# Patient Record
Sex: FEMALE | Race: WHITE | NOT HISPANIC OR LATINO | Employment: OTHER | URBAN - METROPOLITAN AREA
[De-identification: names, ages, dates, MRNs, and addresses within clinical notes are randomized per-mention and may not be internally consistent; named-entity substitution may affect disease eponyms.]

---

## 2017-02-01 ENCOUNTER — GENERIC CONVERSION - ENCOUNTER (OUTPATIENT)
Dept: OTHER | Facility: OTHER | Age: 62
End: 2017-02-01

## 2017-02-02 ENCOUNTER — TRANSCRIBE ORDERS (OUTPATIENT)
Dept: ADMINISTRATIVE | Facility: HOSPITAL | Age: 62
End: 2017-02-02

## 2017-02-02 ENCOUNTER — ALLSCRIPTS OFFICE VISIT (OUTPATIENT)
Dept: OTHER | Facility: OTHER | Age: 62
End: 2017-02-02

## 2017-02-02 ENCOUNTER — HOSPITAL ENCOUNTER (OUTPATIENT)
Dept: RADIOLOGY | Facility: HOSPITAL | Age: 62
Discharge: HOME | End: 2017-02-02
Payer: COMMERCIAL

## 2017-02-02 DIAGNOSIS — M54.5 LOW BACK PAIN, UNSPECIFIED BACK PAIN LATERALITY, UNSPECIFIED CHRONICITY, WITH SCIATICA PRESENCE UNSPECIFIED: Primary | ICD-10-CM

## 2017-02-02 DIAGNOSIS — M54.50 LOW BACK PAIN: ICD-10-CM

## 2017-02-02 DIAGNOSIS — M54.5 LOW BACK PAIN, UNSPECIFIED BACK PAIN LATERALITY, UNSPECIFIED CHRONICITY, WITH SCIATICA PRESENCE UNSPECIFIED: ICD-10-CM

## 2017-02-02 PROCEDURE — 72114 X-RAY EXAM L-S SPINE BENDING: CPT

## 2017-02-12 ENCOUNTER — HOSPITAL ENCOUNTER (EMERGENCY)
Facility: HOSPITAL | Age: 62
Discharge: HOME | End: 2017-02-12
Attending: EMERGENCY MEDICINE | Admitting: EMERGENCY MEDICINE
Payer: COMMERCIAL

## 2017-02-12 VITALS
HEIGHT: 62 IN | TEMPERATURE: 98.5 F | OXYGEN SATURATION: 99 % | WEIGHT: 140 LBS | RESPIRATION RATE: 18 BRPM | HEART RATE: 77 BPM | SYSTOLIC BLOOD PRESSURE: 160 MMHG | BODY MASS INDEX: 25.76 KG/M2 | DIASTOLIC BLOOD PRESSURE: 82 MMHG

## 2017-02-12 DIAGNOSIS — R04.0 NOSEBLEED: Primary | ICD-10-CM

## 2017-02-12 DIAGNOSIS — R09.81 SINUS CONGESTION: ICD-10-CM

## 2017-02-12 PROCEDURE — 99283 EMERGENCY DEPT VISIT LOW MDM: CPT

## 2017-02-12 RX ORDER — FLUTICASONE PROPIONATE 50 MCG
1 SPRAY, SUSPENSION (ML) NASAL DAILY
Qty: 1 BOTTLE | Refills: 0 | Status: SHIPPED | OUTPATIENT
Start: 2017-02-12 | End: 2017-09-16

## 2017-02-12 RX ORDER — ALPRAZOLAM 0.25 MG/1
0.12 TABLET ORAL DAILY PRN
COMMUNITY
Start: 2008-09-25 | End: 2018-02-22 | Stop reason: ALTCHOICE

## 2017-02-20 ENCOUNTER — TRANSCRIBE ORDERS (OUTPATIENT)
Dept: ADMINISTRATIVE | Facility: HOSPITAL | Age: 62
End: 2017-02-20

## 2017-02-20 DIAGNOSIS — E04.2 NONTOXIC MULTINODULAR GOITER: Primary | ICD-10-CM

## 2017-02-21 ENCOUNTER — HOSPITAL ENCOUNTER (OUTPATIENT)
Dept: RADIOLOGY | Facility: HOSPITAL | Age: 62
Discharge: HOME | End: 2017-02-21
Payer: COMMERCIAL

## 2017-02-21 DIAGNOSIS — E04.2 NONTOXIC MULTINODULAR GOITER: ICD-10-CM

## 2017-02-21 PROCEDURE — 76536 US EXAM OF HEAD AND NECK: CPT

## 2017-06-09 ENCOUNTER — HOSPITAL ENCOUNTER (OUTPATIENT)
Dept: RADIOLOGY | Facility: HOSPITAL | Age: 62
Discharge: HOME | End: 2017-06-09
Attending: INTERNAL MEDICINE
Payer: COMMERCIAL

## 2017-06-09 ENCOUNTER — TRANSCRIBE ORDERS (OUTPATIENT)
Dept: ADMINISTRATIVE | Facility: HOSPITAL | Age: 62
End: 2017-06-09

## 2017-06-09 ENCOUNTER — OFFICE VISIT (OUTPATIENT)
Dept: LAB | Facility: HOSPITAL | Age: 62
End: 2017-06-09
Attending: INTERNAL MEDICINE
Payer: COMMERCIAL

## 2017-06-09 DIAGNOSIS — R20.0 NUMBNESS AND TINGLING OF LEFT SIDE OF FACE: ICD-10-CM

## 2017-06-09 DIAGNOSIS — R20.0 NUMBNESS AND TINGLING OF LEFT SIDE OF FACE: Primary | ICD-10-CM

## 2017-06-09 DIAGNOSIS — R20.2 NUMBNESS AND TINGLING OF LEFT SIDE OF FACE: ICD-10-CM

## 2017-06-09 DIAGNOSIS — R20.2 NUMBNESS AND TINGLING OF LEFT SIDE OF FACE: Primary | ICD-10-CM

## 2017-06-09 PROCEDURE — 93005 ELECTROCARDIOGRAM TRACING: CPT

## 2017-06-09 PROCEDURE — 71020 HB CHEST X-RAY 2VW FRONTAL&LATL: CPT

## 2017-06-13 LAB
ATRIAL RATE: 69 BPM
P AXIS: 54 DEGREES
PR INTERVAL: 150 MS
QRS AXIS: 43 DEGREES
QRSD INTERVAL: 76 MS
QT INTERVAL: 380 MS
QTC INTERVAL: 407 MS
T WAVE AXIS: 30 DEGREES
VENTRICULAR RATE: 69 BPM

## 2017-07-03 ENCOUNTER — ALLSCRIPTS OFFICE VISIT (OUTPATIENT)
Dept: OTHER | Facility: OTHER | Age: 62
End: 2017-07-03

## 2017-07-03 DIAGNOSIS — R51 HEADACHE(784.0): ICD-10-CM

## 2017-07-03 DIAGNOSIS — R20.0 ANESTHESIA OF SKIN: ICD-10-CM

## 2017-09-16 ENCOUNTER — HOSPITAL ENCOUNTER (EMERGENCY)
Facility: HOSPITAL | Age: 62
Discharge: HOME | End: 2017-09-16
Attending: EMERGENCY MEDICINE | Admitting: EMERGENCY MEDICINE
Payer: COMMERCIAL

## 2017-09-16 ENCOUNTER — APPOINTMENT (EMERGENCY)
Dept: RADIOLOGY | Facility: HOSPITAL | Age: 62
End: 2017-09-16
Payer: COMMERCIAL

## 2017-09-16 VITALS
TEMPERATURE: 96.7 F | WEIGHT: 148 LBS | OXYGEN SATURATION: 96 % | RESPIRATION RATE: 20 BRPM | BODY MASS INDEX: 27.23 KG/M2 | HEART RATE: 102 BPM | SYSTOLIC BLOOD PRESSURE: 177 MMHG | DIASTOLIC BLOOD PRESSURE: 88 MMHG | HEIGHT: 62 IN

## 2017-09-16 DIAGNOSIS — T14.8XXA MUSCLE STRAIN: Primary | ICD-10-CM

## 2017-09-16 LAB
ALBUMIN SERPL BCP-MCNC: 3.5 G/DL (ref 3.5–5)
ALP SERPL-CCNC: 100 U/L (ref 46–116)
ALT SERPL W P-5'-P-CCNC: 26 U/L (ref 12–78)
ANION GAP SERPL CALCULATED.3IONS-SCNC: 6 MMOL/L (ref 4–13)
AST SERPL W P-5'-P-CCNC: 16 U/L (ref 5–45)
BASOPHILS # BLD AUTO: 0 THOUSANDS/ΜL (ref 0–0.1)
BASOPHILS NFR BLD AUTO: 1 % (ref 0–1)
BILIRUB SERPL-MCNC: 0.3 MG/DL (ref 0.2–1)
BUN SERPL-MCNC: 16 MG/DL (ref 5–25)
CALCIUM SERPL-MCNC: 8.9 MG/DL (ref 8.3–10.1)
CHLORIDE SERPL-SCNC: 106 MMOL/L (ref 100–108)
CK SERPL-CCNC: 45 U/L (ref 26–192)
CO2 SERPL-SCNC: 27 MMOL/L (ref 21–32)
CREAT SERPL-MCNC: 0.85 MG/DL (ref 0.6–1.3)
EOSINOPHIL # BLD AUTO: 0.3 THOUSAND/ΜL (ref 0–0.61)
EOSINOPHIL NFR BLD AUTO: 4 % (ref 0–6)
ERYTHROCYTE [DISTWIDTH] IN BLOOD BY AUTOMATED COUNT: 13.4 % (ref 11.6–15.1)
FLUAV AG SPEC QL IA: NEGATIVE
FLUAV AG SPEC QL: NORMAL
FLUBV AG SPEC QL IA: NEGATIVE
FLUBV AG SPEC QL: NORMAL
GFR SERPL CREATININE-BSD FRML MDRD: 75 ML/MIN/1.73SQ M
GLUCOSE SERPL-MCNC: 101 MG/DL (ref 65–140)
HCT VFR BLD AUTO: 42.2 % (ref 37–47)
HGB BLD-MCNC: 13.9 G/DL (ref 12–16)
LIPASE SERPL-CCNC: 542 U/L (ref 73–393)
LYMPHOCYTES # BLD AUTO: 2.3 THOUSANDS/ΜL (ref 0.6–4.47)
LYMPHOCYTES NFR BLD AUTO: 28 % (ref 14–44)
MAGNESIUM SERPL-MCNC: 2 MG/DL (ref 1.6–2.6)
MCH RBC QN AUTO: 29.1 PG (ref 27–31)
MCHC RBC AUTO-ENTMCNC: 33 G/DL (ref 31.4–37.4)
MCV RBC AUTO: 88 FL (ref 82–98)
MONOCYTES # BLD AUTO: 0.6 THOUSAND/ΜL (ref 0.17–1.22)
MONOCYTES NFR BLD AUTO: 7 % (ref 4–12)
NEUTROPHILS # BLD AUTO: 4.8 THOUSANDS/ΜL (ref 1.85–7.62)
NEUTS SEG NFR BLD AUTO: 60 % (ref 43–75)
NRBC BLD AUTO-RTO: 0 /100 WBCS
PLATELET # BLD AUTO: 236 THOUSANDS/UL (ref 130–400)
PMV BLD AUTO: 7.3 FL (ref 8.9–12.7)
POTASSIUM SERPL-SCNC: 3.7 MMOL/L (ref 3.5–5.3)
PROT SERPL-MCNC: 6.5 G/DL (ref 6.4–8.2)
RBC # BLD AUTO: 4.79 MILLION/UL (ref 4.2–5.4)
RSV B RNA SPEC QL NAA+PROBE: NORMAL
SODIUM SERPL-SCNC: 139 MMOL/L (ref 136–145)
TROPONIN I SERPL-MCNC: <0.02 NG/ML
WBC # BLD AUTO: 8.1 THOUSAND/UL (ref 4.8–10.8)

## 2017-09-16 PROCEDURE — 83690 ASSAY OF LIPASE: CPT | Performed by: EMERGENCY MEDICINE

## 2017-09-16 PROCEDURE — 71010 HB CHEST X-RAY 1 VIEW FRONTAL (PORTABLE): CPT

## 2017-09-16 PROCEDURE — 36415 COLL VENOUS BLD VENIPUNCTURE: CPT | Performed by: EMERGENCY MEDICINE

## 2017-09-16 PROCEDURE — 85025 COMPLETE CBC W/AUTO DIFF WBC: CPT | Performed by: EMERGENCY MEDICINE

## 2017-09-16 PROCEDURE — 87400 INFLUENZA A/B EACH AG IA: CPT | Performed by: EMERGENCY MEDICINE

## 2017-09-16 PROCEDURE — 82550 ASSAY OF CK (CPK): CPT | Performed by: EMERGENCY MEDICINE

## 2017-09-16 PROCEDURE — 93005 ELECTROCARDIOGRAM TRACING: CPT | Performed by: EMERGENCY MEDICINE

## 2017-09-16 PROCEDURE — 84484 ASSAY OF TROPONIN QUANT: CPT | Performed by: EMERGENCY MEDICINE

## 2017-09-16 PROCEDURE — 87798 DETECT AGENT NOS DNA AMP: CPT | Performed by: EMERGENCY MEDICINE

## 2017-09-16 PROCEDURE — 99284 EMERGENCY DEPT VISIT MOD MDM: CPT

## 2017-09-16 PROCEDURE — 96374 THER/PROPH/DIAG INJ IV PUSH: CPT

## 2017-09-16 PROCEDURE — 96361 HYDRATE IV INFUSION ADD-ON: CPT

## 2017-09-16 PROCEDURE — 83735 ASSAY OF MAGNESIUM: CPT | Performed by: EMERGENCY MEDICINE

## 2017-09-16 PROCEDURE — 80053 COMPREHEN METABOLIC PANEL: CPT | Performed by: EMERGENCY MEDICINE

## 2017-09-16 PROCEDURE — 96375 TX/PRO/DX INJ NEW DRUG ADDON: CPT

## 2017-09-16 RX ORDER — CYCLOBENZAPRINE HCL 10 MG
10 TABLET ORAL 2 TIMES DAILY PRN
Qty: 6 TABLET | Refills: 0 | Status: SHIPPED | OUTPATIENT
Start: 2017-09-16 | End: 2018-02-22 | Stop reason: ALTCHOICE

## 2017-09-16 RX ORDER — NAPROXEN 500 MG/1
500 TABLET ORAL 2 TIMES DAILY WITH MEALS
Qty: 10 TABLET | Refills: 0 | Status: SHIPPED | OUTPATIENT
Start: 2017-09-16 | End: 2018-01-24

## 2017-09-16 RX ORDER — ONDANSETRON 2 MG/ML
4 INJECTION INTRAMUSCULAR; INTRAVENOUS ONCE
Status: COMPLETED | OUTPATIENT
Start: 2017-09-16 | End: 2017-09-16

## 2017-09-16 RX ORDER — KETOROLAC TROMETHAMINE 30 MG/ML
30 INJECTION, SOLUTION INTRAMUSCULAR; INTRAVENOUS ONCE
Status: COMPLETED | OUTPATIENT
Start: 2017-09-16 | End: 2017-09-16

## 2017-09-16 RX ADMIN — SODIUM CHLORIDE 1000 ML: 0.9 INJECTION, SOLUTION INTRAVENOUS at 04:30

## 2017-09-16 RX ADMIN — ONDANSETRON 4 MG: 2 INJECTION INTRAMUSCULAR; INTRAVENOUS at 04:29

## 2017-09-16 RX ADMIN — KETOROLAC TROMETHAMINE 30 MG: 30 INJECTION, SOLUTION INTRAMUSCULAR at 04:30

## 2017-09-18 LAB
ATRIAL RATE: 80 BPM
P AXIS: 66 DEGREES
PR INTERVAL: 160 MS
QRS AXIS: 47 DEGREES
QRSD INTERVAL: 80 MS
QT INTERVAL: 384 MS
QTC INTERVAL: 442 MS
T WAVE AXIS: 38 DEGREES
VENTRICULAR RATE: 80 BPM

## 2017-10-16 ENCOUNTER — GENERIC CONVERSION - ENCOUNTER (OUTPATIENT)
Dept: OTHER | Facility: OTHER | Age: 62
End: 2017-10-16

## 2017-10-16 DIAGNOSIS — G57.02 LESION OF LEFT SCIATIC NERVE: ICD-10-CM

## 2017-10-16 DIAGNOSIS — M54.2 CERVICALGIA: ICD-10-CM

## 2017-10-16 DIAGNOSIS — M77.8 OTHER ENTHESOPATHIES, NOT ELSEWHERE CLASSIFIED: ICD-10-CM

## 2017-10-16 DIAGNOSIS — M54.50 LOW BACK PAIN: ICD-10-CM

## 2017-10-18 ENCOUNTER — HOSPITAL ENCOUNTER (OUTPATIENT)
Dept: RADIOLOGY | Facility: HOSPITAL | Age: 62
Discharge: HOME | End: 2017-10-18
Attending: FAMILY MEDICINE
Payer: COMMERCIAL

## 2017-10-18 ENCOUNTER — TRANSCRIBE ORDERS (OUTPATIENT)
Dept: ADMINISTRATIVE | Facility: HOSPITAL | Age: 62
End: 2017-10-18

## 2017-10-18 DIAGNOSIS — M77.8 WRIST CAPSULITIS: Primary | ICD-10-CM

## 2017-10-18 DIAGNOSIS — M77.8 WRIST CAPSULITIS: ICD-10-CM

## 2017-10-18 PROCEDURE — 73110 X-RAY EXAM OF WRIST: CPT

## 2017-10-24 ENCOUNTER — GENERIC CONVERSION - ENCOUNTER (OUTPATIENT)
Dept: OTHER | Facility: OTHER | Age: 62
End: 2017-10-24

## 2017-11-02 ENCOUNTER — GENERIC CONVERSION - ENCOUNTER (OUTPATIENT)
Dept: OTHER | Facility: OTHER | Age: 62
End: 2017-11-02

## 2017-11-02 ENCOUNTER — ALLSCRIPTS OFFICE VISIT (OUTPATIENT)
Dept: OTHER | Facility: OTHER | Age: 62
End: 2017-11-02

## 2017-11-07 ENCOUNTER — TRANSCRIBE ORDERS (OUTPATIENT)
Dept: ADMINISTRATIVE | Facility: HOSPITAL | Age: 62
End: 2017-11-07

## 2017-11-07 DIAGNOSIS — Z12.31 ENCOUNTER FOR SCREENING MAMMOGRAM FOR MALIGNANT NEOPLASM OF BREAST: Primary | ICD-10-CM

## 2017-11-09 ENCOUNTER — TRANSCRIBE ORDERS (OUTPATIENT)
Dept: ADMINISTRATIVE | Facility: HOSPITAL | Age: 62
End: 2017-11-09

## 2017-11-09 ENCOUNTER — HOSPITAL ENCOUNTER (OUTPATIENT)
Dept: RADIOLOGY | Facility: HOSPITAL | Age: 62
Discharge: HOME | End: 2017-11-09
Payer: COMMERCIAL

## 2017-11-09 DIAGNOSIS — M54.2 CERVICALGIA: ICD-10-CM

## 2017-11-09 PROCEDURE — 72050 X-RAY EXAM NECK SPINE 4/5VWS: CPT

## 2017-12-05 ENCOUNTER — GENERIC CONVERSION - ENCOUNTER (OUTPATIENT)
Dept: OTHER | Facility: OTHER | Age: 62
End: 2017-12-05

## 2017-12-15 ENCOUNTER — HOSPITAL ENCOUNTER (EMERGENCY)
Facility: HOSPITAL | Age: 62
Discharge: HOME | End: 2017-12-16
Attending: EMERGENCY MEDICINE | Admitting: EMERGENCY MEDICINE
Payer: COMMERCIAL

## 2017-12-15 ENCOUNTER — APPOINTMENT (EMERGENCY)
Dept: RADIOLOGY | Facility: HOSPITAL | Age: 62
End: 2017-12-15
Payer: COMMERCIAL

## 2017-12-15 DIAGNOSIS — J18.9 PNEUMONIA: ICD-10-CM

## 2017-12-15 DIAGNOSIS — R09.1 PLEURISY: Primary | ICD-10-CM

## 2017-12-15 LAB
ALBUMIN SERPL BCP-MCNC: 3.5 G/DL (ref 3.5–5)
ALP SERPL-CCNC: 132 U/L (ref 46–116)
ALT SERPL W P-5'-P-CCNC: 22 U/L (ref 12–78)
ANION GAP SERPL CALCULATED.3IONS-SCNC: 8 MMOL/L (ref 4–13)
APTT PPP: 28 SECONDS (ref 24–33)
AST SERPL W P-5'-P-CCNC: 14 U/L (ref 5–45)
BASOPHILS # BLD AUTO: 0 THOUSANDS/ΜL (ref 0–0.1)
BASOPHILS NFR BLD AUTO: 0 % (ref 0–1)
BILIRUB SERPL-MCNC: 0.2 MG/DL (ref 0.2–1)
BUN SERPL-MCNC: 11 MG/DL (ref 5–25)
CALCIUM SERPL-MCNC: 9 MG/DL (ref 8.3–10.1)
CHLORIDE SERPL-SCNC: 104 MMOL/L (ref 100–108)
CO2 SERPL-SCNC: 28 MMOL/L (ref 21–32)
CREAT SERPL-MCNC: 0.99 MG/DL (ref 0.6–1.3)
DEPRECATED D DIMER PPP: 870 NG/ML (FEU) (ref 190–520)
EOSINOPHIL # BLD AUTO: 0.3 THOUSAND/ΜL (ref 0–0.61)
EOSINOPHIL NFR BLD AUTO: 2 % (ref 0–6)
ERYTHROCYTE [DISTWIDTH] IN BLOOD BY AUTOMATED COUNT: 13.1 % (ref 11.6–15.1)
GFR SERPL CREATININE-BSD FRML MDRD: 62 ML/MIN/1.73SQ M
GLUCOSE SERPL-MCNC: 149 MG/DL (ref 65–140)
HCT VFR BLD AUTO: 39.9 % (ref 37–47)
HGB BLD-MCNC: 13.4 G/DL (ref 12–16)
INR PPP: 0.95 (ref 0.86–1.16)
LG PLATELETS BLD QL SMEAR: PRESENT
LYMPHOCYTES # BLD AUTO: 2.4 THOUSANDS/ΜL (ref 0.6–4.47)
LYMPHOCYTES NFR BLD AUTO: 19 % (ref 14–44)
MCH RBC QN AUTO: 29.6 PG (ref 27–31)
MCHC RBC AUTO-ENTMCNC: 33.6 G/DL (ref 31.4–37.4)
MCV RBC AUTO: 88 FL (ref 82–98)
MONOCYTES # BLD AUTO: 0.9 THOUSAND/ΜL (ref 0.17–1.22)
MONOCYTES NFR BLD AUTO: 7 % (ref 4–12)
NEUTROPHILS # BLD AUTO: 9.4 THOUSANDS/ΜL (ref 1.85–7.62)
NEUTS SEG NFR BLD AUTO: 72 % (ref 43–75)
NRBC BLD AUTO-RTO: 0 /100 WBCS
PLATELET # BLD AUTO: 229 THOUSANDS/UL (ref 130–400)
PLATELET BLD QL SMEAR: ADEQUATE
PMV BLD AUTO: 7.4 FL (ref 8.9–12.7)
POTASSIUM SERPL-SCNC: 3.3 MMOL/L (ref 3.5–5.3)
PROT SERPL-MCNC: 7.2 G/DL (ref 6.4–8.2)
PROTHROMBIN TIME: 10 SECONDS (ref 9.4–11.7)
RBC # BLD AUTO: 4.53 MILLION/UL (ref 4.2–5.4)
SODIUM SERPL-SCNC: 140 MMOL/L (ref 136–145)
TROPONIN I SERPL-MCNC: <0.02 NG/ML
WBC # BLD AUTO: 13.1 THOUSAND/UL (ref 4.8–10.8)

## 2017-12-15 PROCEDURE — 85025 COMPLETE CBC W/AUTO DIFF WBC: CPT

## 2017-12-15 PROCEDURE — 71275 CT ANGIOGRAPHY CHEST: CPT

## 2017-12-15 PROCEDURE — 85610 PROTHROMBIN TIME: CPT

## 2017-12-15 PROCEDURE — 80053 COMPREHEN METABOLIC PANEL: CPT

## 2017-12-15 PROCEDURE — 85379 FIBRIN DEGRADATION QUANT: CPT

## 2017-12-15 PROCEDURE — 85730 THROMBOPLASTIN TIME PARTIAL: CPT

## 2017-12-15 PROCEDURE — 84484 ASSAY OF TROPONIN QUANT: CPT

## 2017-12-15 PROCEDURE — 93005 ELECTROCARDIOGRAM TRACING: CPT

## 2017-12-15 PROCEDURE — 36415 COLL VENOUS BLD VENIPUNCTURE: CPT

## 2017-12-15 PROCEDURE — 71010 HB CHEST X-RAY 1 VIEW FRONTAL (PORTABLE): CPT

## 2017-12-15 RX ORDER — NICOTINE 21 MG/24HR
21 PATCH, TRANSDERMAL 24 HOURS TRANSDERMAL ONCE
Status: DISCONTINUED | OUTPATIENT
Start: 2017-12-15 | End: 2017-12-16 | Stop reason: HOSPADM

## 2017-12-15 RX ORDER — ACETAMINOPHEN 325 MG/1
650 TABLET ORAL ONCE
Status: COMPLETED | OUTPATIENT
Start: 2017-12-15 | End: 2017-12-15

## 2017-12-15 RX ORDER — KETOROLAC TROMETHAMINE 30 MG/ML
30 INJECTION, SOLUTION INTRAMUSCULAR; INTRAVENOUS ONCE
Status: DISCONTINUED | OUTPATIENT
Start: 2017-12-15 | End: 2017-12-16 | Stop reason: HOSPADM

## 2017-12-15 RX ADMIN — ACETAMINOPHEN 650 MG: 325 TABLET, FILM COATED ORAL at 22:09

## 2017-12-15 RX ADMIN — NICOTINE 21 MG: 21 PATCH, EXTENDED RELEASE TRANSDERMAL at 23:54

## 2017-12-15 RX ADMIN — IOHEXOL 85 ML: 350 INJECTION, SOLUTION INTRAVENOUS at 22:35

## 2017-12-16 VITALS
HEART RATE: 78 BPM | TEMPERATURE: 99.1 F | HEIGHT: 62 IN | RESPIRATION RATE: 20 BRPM | OXYGEN SATURATION: 97 % | SYSTOLIC BLOOD PRESSURE: 116 MMHG | DIASTOLIC BLOOD PRESSURE: 70 MMHG | BODY MASS INDEX: 27.6 KG/M2 | WEIGHT: 150 LBS

## 2017-12-16 LAB — TROPONIN I SERPL-MCNC: <0.02 NG/ML

## 2017-12-16 PROCEDURE — 84484 ASSAY OF TROPONIN QUANT: CPT | Performed by: EMERGENCY MEDICINE

## 2017-12-16 PROCEDURE — 99285 EMERGENCY DEPT VISIT HI MDM: CPT

## 2017-12-16 PROCEDURE — 36415 COLL VENOUS BLD VENIPUNCTURE: CPT | Performed by: EMERGENCY MEDICINE

## 2017-12-16 RX ORDER — ALBUTEROL SULFATE 90 UG/1
2 AEROSOL, METERED RESPIRATORY (INHALATION) EVERY 4 HOURS PRN
Qty: 1 INHALER | Refills: 0 | Status: SHIPPED | OUTPATIENT
Start: 2017-12-16 | End: 2018-01-24

## 2017-12-16 RX ORDER — NAPROXEN 500 MG/1
500 TABLET ORAL 2 TIMES DAILY WITH MEALS
Qty: 10 TABLET | Refills: 0 | Status: SHIPPED | OUTPATIENT
Start: 2017-12-16 | End: 2018-01-24

## 2017-12-16 RX ORDER — DOXYCYCLINE HYCLATE 100 MG/1
100 CAPSULE ORAL 2 TIMES DAILY
Qty: 14 CAPSULE | Refills: 0 | Status: SHIPPED | OUTPATIENT
Start: 2017-12-16 | End: 2017-12-23

## 2017-12-16 NOTE — ED PROVIDER NOTES
History  Chief Complaint   Patient presents with    Chest Pain     pt states started with pain left side of chest about 1 hour ago, was just sitting in a chair when it started, pain gets worse with deep breath, states has had a URI for 1 week, cough productive for yellow sputum     29-year-old female presents with left-sided chest pain radiating into her back around the side that started 45 minutes ago acutely when she was resting  The pain is sharp continuous currently a 7/10 deep breathing makes it worse nothing makes it better  She reports she has been suffering with upper respiratory symptoms and cough with for the past week  She is not on any antibiotics at this time  No dyspnea reported  Had a stress test more than a year ago which was negative  She has family history for CAD and is a smoker with a history of high cholesterol  Denies any hypertension or diabetes  History provided by:  Patient   used: No        Prior to Admission Medications   Prescriptions Last Dose Informant Patient Reported? Taking? ALPRAZolam (XANAX) 0 25 mg tablet   Yes No   Sig: Take 0 125 mg by mouth daily as needed     acetaminophen (TYLENOL) 325 mg tablet   Yes No   Sig: Take 650 mg by mouth every 4 (four) hours as needed for mild pain     cyclobenzaprine (FLEXERIL) 10 mg tablet   No No   Sig: Take 1 tablet by mouth 2 (two) times a day as needed for muscle spasms for up to 3 days   naproxen (NAPROSYN) 500 mg tablet   No No   Sig: Take 1 tablet by mouth 2 (two) times a day with meals for 5 days      Facility-Administered Medications: None       Past Medical History:   Diagnosis Date    Acid reflux     Anxiety     Arthritis     Cancer ([facility] [address] )     skin on chin    Chronic kidney disease     (R) kidney smaller than (L),  kidney stones    Chronic UTI (urinary tract infection)     COPD (chronic obstructive pulmonary disease) (HCC)     mild, recent dx    Depression     [place] (hard of hearing)     deaf (L) ear, decreased hearing (R) ear    Hyperlipemia     diet controlled    IBS (irritable bowel syndrome)     Meniere disease        Past Surgical History:   Procedure Laterality Date    COLONOSCOPY N/A 11/4/2016    Procedure: COLONOSCOPY;  Surgeon: Patricia Jerome MD;  Location: [place] [address] GI LAB; Service:     ESOPHAGOGASTRODUODENOSCOPY N/A 11/4/2016    Procedure: ESOPHAGOGASTRODUODENOSCOPY (EGD); Surgeon: Patricia Jerome MD;  Location: [facility] GI LAB; Service:     EYE SURGERY       [address] CATARACT EXTRACAP,INSERT LENS Left 3/7/2016    Procedure: EXTRACTION EXTRACAPSULAR CATARACT PHACO INTRAOCULAR LENS (IOL); Surgeon: Carol Ann Brennan MD;  Location: [facility] MAIN OR;  Service: Ophthalmology    SKIN CANCER EXCISION      excision skin cancer on chin       History reviewed  No pertinent family history  I have reviewed and agree with the history as documented  Social History   Substance Use Topics    Smoking status: Current Every Day Smoker     Packs/day: 1 00     Years: 43 00     Types: Cigarettes    Smokeless tobacco: Never Used    Alcohol use No        Review of Systems   All other systems reviewed and are negative  Physical Exam  ED Triage Vitals [12/15/17 2133]   Temperature Pulse Respirations Blood Pressure SpO2   99 1 °F (37 3 °C) (!) 120 20 (!) 165/102 100 %      Temp Source Heart Rate Source Patient Position - Orthostatic VS BP Location FiO2 (%)   Tympanic Monitor Lying Right arm --      Pain Score       8           Orthostatic Vital Signs  Vitals:    12/15/17 2318 12/16/17 0000 12/16/17 0134 12/16/17 0231   BP: 140/78 128/73 109/98 116/70   Pulse: 88 74 97 78   Patient Position - Orthostatic VS: Sitting Lying Lying Lying       Physical Exam   Constitutional: She is oriented to person, place, and time  She appears well-developed and well-nourished  HENT:   Head: Normocephalic and atraumatic  Eyes: EOM are normal  Pupils are equal, round, and reactive to light  Neck: Normal range of motion  Neck supple  Cardiovascular: Normal rate and regular rhythm  Pulmonary/Chest: Effort normal and breath sounds normal    Abdominal: Soft  Bowel sounds are normal    Musculoskeletal: Normal range of motion  Left-sided chest wall tenderness noted underneath the left breast as well as on the side  Neurological: She is alert and oriented to person, place, and time  Skin: Skin is warm and dry  Psychiatric: She has a normal mood and affect  Nursing note and vitals reviewed  ED Medications  Medications   ketorolac (TORADOL) injection 30 mg (30 mg Intravenous Not Given 12/15/17 2221)   nicotine (NICODERM CQ) 21 mg/24 hr TD 24 hr patch 21 mg (21 mg Transdermal Medication Applied 12/15/17 2354)   acetaminophen (TYLENOL) tablet 650 mg (650 mg Oral Given 12/15/17 2209)   iohexol (OMNIPAQUE) 350 MG/ML injection (MULTI-DOSE) 85 mL (85 mL Intravenous Given 12/15/17 2235)       Diagnostic Studies  Results Reviewed     Procedure Component Value Units Date/Time    Troponin I [[id number]]  (Normal) Collected:  12/16/17 0135    Lab Status:  Final result Specimen:  Blood from Vein Updated:  12/16/17 0201     Troponin I <0 02 ng/mL     Narrative:         [employer] Chemistry analyzer 99% cutoff is > 0 04 ng/mL in network labs    o cTnI 99% cutoff is useful only when applied to patients in the clinical setting of myocardial ischemia  o cTnI 99% cutoff should be interpreted in the context of clinical history, ECG findings and possibly cardiac imaging to establish correct diagnosis  o cTnI 99% cutoff may be suggestive but clearly not indicative of a coronary event without the clinical setting of myocardial ischemia      Emiliana Agarwal [[id number]]  (Normal) Collected:  12/15/17 2137    Lab Status:  Final result Specimen:  Blood from Arm, Right Updated:  12/15/17 2211     Protime 10 0 seconds      INR 0 95    APTT [[id number]]  (Normal) Collected:  12/15/17 2137    Lab Status:  Final result Specimen:  Blood from Arm, Right Updated: 12/15/17 2211     PTT 28 seconds     Narrative: Therapeutic Heparin Range = 60-90 seconds    D-dimer, quantitative [[id number]]  (Abnormal) Collected:  12/15/17 2137    Lab Status:  Final result Specimen:  Blood from Arm, Right Updated:  12/15/17 2211     D-DimerIan 870 (H) ng/ml (FEU)     Troponin I [[id number]]  (Normal) Collected:  12/15/17 2137    Lab Status:  Final result Specimen:  Blood from Arm, Right Updated:  12/15/17 2204     Troponin I <0 02 ng/mL     Narrative:         [employer]ens Chemistry analyzer 99% cutoff is > 0 04 ng/mL in network labs    o cTnI 99% cutoff is useful only when applied to patients in the clinical setting of myocardial ischemia  o cTnI 99% cutoff should be interpreted in the context of clinical history, ECG findings and possibly cardiac imaging to establish correct diagnosis  o cTnI 99% cutoff may be suggestive but clearly not indicative of a coronary event without the clinical setting of myocardial ischemia  Comprehensive metabolic panel [[id number]]  (Abnormal) Collected:  12/15/17 2137    Lab Status:  Final result Specimen:  Blood from Arm, Right Updated:  12/15/17 2159     Sodium 140 mmol/L      Potassium 3 3 (L) mmol/L      Chloride 104 mmol/L      CO2 28 mmol/L      Anion Gap 8 mmol/L      BUN 11 mg/dL      Creatinine 0 99 mg/dL      Glucose 149 (H) mg/dL      Calcium 9 0 mg/dL      AST 14 U/L      ALT 22 U/L      Alkaline Phosphatase 132 (H) U/L      Total Protein 7 2 g/dL      Albumin 3 5 g/dL      Total Bilirubin 0 20 mg/dL      eGFR 62 ml/min/1 73sq m     Narrative:         National Kidney Disease Education Program recommendations are as follows:  GFR calculation is accurate only with a steady state creatinine  Chronic Kidney disease less than 60 ml/min/1 73 sq  meters  Kidney failure less than 15 ml/min/1 73 sq  meters      CBC and differential [[id number]]  (Abnormal) Collected:  12/15/17 2137    Lab Status:  Final result Specimen:  Blood from Arm, Right Updated: 12/15/17 2158     WBC 13 10 (H) Thousand/uL      RBC 4 53 Million/uL      Hemoglobin 13 4 g/dL      Hematocrit 39 9 %      MCV 88 fL      MCH 29 6 pg      MCHC 33 6 g/dL      RDW 13 1 %      MPV 7 4 (L) fL      Platelets 183 Thousands/uL      nRBC 0 /100 WBCs      Neutrophils Relative 72 %      Lymphocytes Relative 19 %      Monocytes Relative 7 %      Eosinophils Relative 2 %      Basophils Relative 0 %      Neutrophils Absolute 9 40 (H) Thousands/µL      Lymphocytes Absolute 2 40 Thousands/µL      Monocytes Absolute 0 90 Thousand/µL      Eosinophils Absolute 0 30 Thousand/µL      Basophils Absolute 0 00 Thousands/µL                  X-ray chest 1 view portable   Final Result by Anthony Carpenter MD (12/16 0012)      No active pulmonary disease  Workstation performed: [id number]         CTA chest pe study   Final Result by Anthony Carpenter MD (12/15 7845)         1  No evidence of acute pulmonary embolus, thoracic aortic and as or dissection  2  Right lower lobe 1 4 cm pulmonary nodule, concerning for malignancy  PET/CT or biopsy recommended  3  Focal airspace opacification in the lingula represent mild pneumonia or aspiration  4  Left thyroid 1 cm nodule, without suspicious features  No follow-up required                             Workstation performed: [id number]                    Procedures  ECG 12 Lead Documentation  Date/Time: 12/15/2017 9:34 PM  Performed by: Brianna San by: Koby Alejo     ECG reviewed by me, the ED Provider: yes    Patient location:  ED  Previous ECG:     Previous ECG:  Unavailable    Comparison to cardiac monitor: Yes    Interpretation:     Interpretation: abnormal    Rate:     ECG rate assessment: tachycardic    Rhythm:     Rhythm: sinus rhythm    Ectopy:     Ectopy: none    QRS:     QRS axis:  Normal  Conduction:     Conduction: normal    ST segments:     ST segments:  Normal  T waves:     T waves: normal             Phone Contacts  ED Phone Contact    ED Course  ED Course                                MDM  Number of Diagnoses or Management Options  Pleurisy:   Pneumonia:   Diagnosis management comments: Patient evaluated with labs EKG chest x-ray and CT PE  I found that she had pneumonia on her CT of chest which goes also with a clinical picture that she has had upper respiratory symptoms with productive cough for the past week  Treated her for the pneumonia  2nd troponin negative after 4 hours   Discharged with follow up with her family doctor  Patient verbalized understanding of instructions and had no further questions at the time of discharge  Amount and/or Complexity of Data Reviewed  Clinical lab tests: ordered and reviewed  Tests in the radiology section of CPT®: ordered and reviewed  Tests in the medicine section of CPT®: ordered and reviewed    Patient Progress  Patient progress: stable    CritCare Time    Disposition  Final diagnoses:   Pleurisy   Pneumonia     Time reflects when diagnosis was documented in both MDM as applicable and the Disposition within this note     Time User Action Codes Description Comment    12/16/2017  2:03 AM Anepu, May Alphonse Add [R09 1] Pleurisy     12/16/2017  2:03 AM Zulemau, May Alphonse Add [J18 9] Pneumonia       ED Disposition     ED Disposition Condition Comment    Discharge  Maylin Aranda discharge to home/self care  Condition at discharge: Stable        Follow-up Information     Follow up With Specialties Details Why Contact Info Additional Information    Alissa Solorzano MD Internal Medicine Schedule an appointment as soon as possible for a visit  [phone]    [address] [place] [address] [id number] [place] [address] Emergency Department Emergency Medicine  If symptoms worsen 7 [facility] [address]  [phone] [facility], [place], [place], [address]        Discharge Medication List as of 12/16/2017  2:06 AM      START taking these medications    Details   albuterol (PROVENTIL HFA,VENTOLIN HFA) 90 mcg/act inhaler Inhale 2 puffs every 4 (four) hours as needed for wheezing, Starting Sat 12/16/2017, Print      doxycycline hyclate (VIBRAMYCIN) 100 mg capsule Take 1 capsule by mouth 2 (two) times a day for 7 days, Starting Sat 12/16/2017, Until Sat 12/23/2017, Print         CONTINUE these medications which have CHANGED    Details   naproxen (NAPROSYN) 500 mg tablet Take 1 tablet by mouth 2 (two) times a day with meals for 5 days, Starting Sat 12/16/2017, Until u 12/21/2017, Print         CONTINUE these medications which have NOT CHANGED    Details   acetaminophen (TYLENOL) 325 mg tablet Take 650 mg by mouth every 4 (four) hours as needed for mild pain , Historical Med      ALPRAZolam (XANAX) 0 25 mg tablet Take 0 125 mg by mouth daily as needed  , Starting 9/25/2008, Until Discontinued, Historical Med      cyclobenzaprine (FLEXERIL) 10 mg tablet Take 1 tablet by mouth 2 (two) times a day as needed for muscle spasms for up to 3 days, Starting Sat 9/16/2017, Until Tue 9/19/2017, Print           No discharge procedures on file      ED Provider  Electronically Signed by           Anson Quintero, DO  12/16/17 0994

## 2017-12-16 NOTE — DISCHARGE INSTRUCTIONS
Community Acquired Pneumonia   WHAT YOU NEED TO KNOW:   Community-acquired pneumonia (CAP) is a lung infection that you get outside of a hospital or nursing home setting  Your lungs become inflamed and cannot work well  CAP may be caused by bacteria, viruses, or fungi  DISCHARGE INSTRUCTIONS:   Return to the emergency department if:   · You are confused and cannot think clearly  · You have increased trouble breathing  · Your lips or fingernails turn gray or blue  Contact your healthcare provider if:   · Your symptoms do not get better, or they get worse  · You are urinating less, or not at all  · You have questions or concerns about your condition or care  Medicines:   · Medicines  may be given to treat a bacterial, viral, or fungal infection  You may also be given medicines to dilate your bronchial tubes to help you breathe more easily  · Take your medicine as directed  Contact your healthcare provider if you think your medicine is not helping or if you have side effects  Tell him or her if you are allergic to any medicine  Keep a list of the medicines, vitamins, and herbs you take  Include the amounts, and when and why you take them  Bring the list or the pill bottles to follow-up visits  Carry your medicine list with you in case of an emergency  Follow up with your healthcare provider within 3 days or as directed: You may need another x-ray  Write down your questions so you remember to ask them during your visits  Deep breathing and coughing:  Deep breathing helps open the air passages in your lungs  Coughing helps bring up mucus from your lungs  Take a deep breath and hold the breath as long as you can  Then push the air out of your lungs with a deep, strong cough  Spit out any mucus you have coughed up  Take 10 deep breaths in a row every hour that you are awake  Remember to follow each deep breath with a cough     Do not smoke or allow others to smoke around you:  Nicotine and other chemicals in cigarettes and cigars can cause lung damage  Ask your healthcare provider for information if you currently smoke and need help to quit  E-cigarettes or smokeless tobacco still contain nicotine  Talk to your healthcare provider before you use these products  Manage CAP at home:   · Breathe warm, moist air  This helps loosen mucus  Loosely place a warm, wet washcloth over your nose and mouth  A room humidifier may also help make the air moist     · Drink liquids as directed  Ask your healthcare provider how much liquid to drink each day and which liquids to drink  Liquids help make mucus thin and easier to get out of your body  · Gently tap your chest   This helps loosen mucus so it is easier to cough  Lie with your head lower than your chest several times a day and tap your chest      · Get plenty of rest   Rest helps your body heal   Prevent CAP:   · Wash your hands often with soap and water  Carry germ-killing hand gel with you  You can use the gel to clean your hands when soap and water are not available  Do not touch your eyes, nose, or mouth unless you have washed your hands first      · Clean surfaces often  Clean doorknobs, countertops, cell phones, and other surfaces that are touched often  · Always cover your mouth when you cough  Cough into a tissue or your shirtsleeve so you do not spread germs from your hands  · Try to avoid people who have a cold or the flu  If you are sick, stay away from others as much as possible  · Ask about vaccines  You may need a vaccine to help prevent pneumonia  Get an influenza (flu) vaccine every year as soon as it becomes available  © 2017 [employer] Information is for End User's use only and may not be sold, redistributed or otherwise used for commercial purposes  All illustrations and images included in CareNotes® are the copyrighted property of [employer]  or [address]    The above information is an  only  It is not intended as medical advice for individual conditions or treatments  Talk to your doctor, nurse or pharmacist before following any medical regimen to see if it is safe and effective for you  Chest Wall Pain   AMBULATORY CARE:   Chest wall pain  may be caused by problems with the muscles, cartilage, or bones of the chest wall  Chest wall pain may also be caused by pain that spreads to your chest from another part of your body  The pain may be aching, severe, dull, or sharp  It may come and go, or it may be constant  The pain may be worse when you move in certain ways, breathe deeply, or cough  Call 911 if:   · You have any of the following signs of a heart attack:      ¨ Squeezing, pressure, or pain in your chest that lasts longer than 5 minutes or returns    ¨ Discomfort or pain in your back, neck, jaw, stomach, or arm     ¨ Trouble breathing    ¨ Nausea or vomiting    ¨ Lightheadedness or a sudden cold sweat, especially with chest pain or trouble breathing    Seek care immediately if:   · You have severe pain  Contact your healthcare provider if:   · You develop a rash  · You have other new symptoms  · Your pain does not improve, even with treatment  · You have questions or concerns about your condition or care  Treatment  depends on the cause of your chest wall pain  You may need any of the following to treat or manage your pain:  · NSAIDs , such as ibuprofen, help decrease swelling, pain, and fever  This medicine is available with or without a doctor's order  NSAIDs can cause stomach bleeding or kidney problems in certain people  If you take blood thinner medicine, always ask your healthcare provider if NSAIDs are safe for you  Always read the medicine label and follow directions  · Acetaminophen  decreases pain  It is available without a doctor's order  Ask how much to take and how often to take it  Follow directions   Acetaminophen can cause liver damage if not taken correctly  · A cream  may be applied to your chest to decrease pain  · Rest  as needed  Avoid activities that make your chest wall pain worse  · Apply heat  on your chest for 20 to 30 minutes every 2 hours for as many days as directed  Heat helps decrease pain and muscle spasms  · Apply ice  on your chest for 15 to 20 minutes every hour or as directed  Use an ice pack, or put crushed ice in a plastic bag  Cover it with a towel  Ice helps prevent tissue damage and decreases swelling and pain  Follow up with your healthcare provider as directed:  Write down your questions so you remember to ask them during your visits  © 2017 [employer] Information is for End User's use only and may not be sold, redistributed or otherwise used for commercial purposes  All illustrations and images included in CareNotes® are the copyrighted property of [employer]  or [employer]  The above information is an  only  It is not intended as medical advice for individual conditions or treatments  Talk to your doctor, nurse or pharmacist before following any medical regimen to see if it is safe and effective for you

## 2017-12-17 LAB
ATRIAL RATE: 124 BPM
P AXIS: 66 DEGREES
PR INTERVAL: 142 MS
QRS AXIS: 52 DEGREES
QRSD INTERVAL: 78 MS
QT INTERVAL: 316 MS
QTC INTERVAL: 453 MS
T WAVE AXIS: 53 DEGREES
VENTRICULAR RATE: 124 BPM

## 2017-12-27 ENCOUNTER — ALLSCRIPTS OFFICE VISIT (OUTPATIENT)
Dept: OTHER | Facility: OTHER | Age: 62
End: 2017-12-27

## 2017-12-27 ENCOUNTER — TRANSCRIBE ORDERS (OUTPATIENT)
Dept: ADMINISTRATIVE | Facility: HOSPITAL | Age: 62
End: 2017-12-27

## 2017-12-27 ENCOUNTER — HOSPITAL ENCOUNTER (OUTPATIENT)
Dept: RADIOLOGY | Facility: HOSPITAL | Age: 62
Discharge: HOME | End: 2017-12-27
Payer: COMMERCIAL

## 2017-12-27 DIAGNOSIS — R07.9 CHEST PAIN: ICD-10-CM

## 2017-12-27 DIAGNOSIS — R91.1 SOLITARY PULMONARY NODULE: ICD-10-CM

## 2017-12-27 DIAGNOSIS — R06.2 WHEEZING: ICD-10-CM

## 2017-12-27 PROCEDURE — 71020 HB CHEST X-RAY 2VW FRONTAL&LATL: CPT

## 2018-01-03 ENCOUNTER — HOSPITAL ENCOUNTER (EMERGENCY)
Facility: HOSPITAL | Age: 63
Discharge: HOME | End: 2018-01-03
Attending: EMERGENCY MEDICINE | Admitting: EMERGENCY MEDICINE
Payer: MEDICARE

## 2018-01-03 VITALS
OXYGEN SATURATION: 98 % | WEIGHT: 158 LBS | HEIGHT: 62 IN | BODY MASS INDEX: 29.08 KG/M2 | SYSTOLIC BLOOD PRESSURE: 141 MMHG | DIASTOLIC BLOOD PRESSURE: 65 MMHG | HEART RATE: 98 BPM | RESPIRATION RATE: 18 BRPM | TEMPERATURE: 97.7 F

## 2018-01-03 DIAGNOSIS — R04.0 EPISTAXIS: Primary | ICD-10-CM

## 2018-01-03 DIAGNOSIS — J32.9 SINUSITIS: ICD-10-CM

## 2018-01-03 PROCEDURE — 99283 EMERGENCY DEPT VISIT LOW MDM: CPT

## 2018-01-03 NOTE — DISCHARGE INSTRUCTIONS
Rhinosinusitis   AMBULATORY CARE:   Rhinosinusitis (RS)  is inflammation of your nose and sinuses  It commonly begins as a virus, often as a common cold  Viruses usually last 7 to 10 days and do not need treatment  When the virus does not get better on its own, you may have bacterial RS  This means that bacteria have begun to grow inside your sinuses  Acute RS lasts less than 4 weeks  Chronic RS lasts 12 weeks or more  Recurrent RS is when you have 4 or more episodes of RS in one year  Your signs and symptoms  may be worse when you lie on your back or try to sleep  You may have any of the following:  · Stuffy nose and reduced sense of smell     · Runny nose with thick yellow or green mucus     · Pressure or pain on your face or a headache     · Pain in your teeth or bad breath     · Ear pain or pressure     · Fever or cough     · Tiredness  Seek care immediately if:   · You have double vision or you cannot see  · You have a stiff neck, a fever, or a bad headache  · Your eyeball bulges out or you cannot move your eye  · Your eye and eyelid are red, swollen, and painful  · You cannot open your eye  · You are more sleepy than normal, or you notice changes in your ability to think, move, or talk  · You have swelling of your forehead or scalp  Contact your healthcare provider if:   · Your symptoms are worse or do not improve after 3 to 5 days of treatment  · You have questions or concerns about your condition or care  Treatment for rhinosinusitis  may include any of the following:  · Acetaminophen  decreases pain and fever  It is available without a doctor's order  Ask how much to take and how often to take it  Follow directions  Acetaminophen can cause liver damage if not taken correctly  · NSAIDs , such as ibuprofen, help decrease swelling, pain, and fever  This medicine is available with or without a doctor's order   NSAIDs can cause stomach bleeding or kidney problems in certain people  If you take blood thinner medicine, always ask your healthcare provider if NSAIDs are safe for you  Always read the medicine label and follow directions  · Nasal steroid sprays  decrease inflammation in your nose and sinuses  · Decongestants  reduce swelling and drain mucus in the nose and sinuses  They may help you breathe easier  · Antihistamines  dry mucus in the nose and relieve sneezing  · Antibiotics  treat a bacterial infection and may be needed if your symptoms do not improve or they get worse  · Take your medicine as directed  Contact your healthcare provider if you think your medicine is not helping or if you have side effects  Tell him or her if you are allergic to any medicine  Keep a list of the medicines, vitamins, and herbs you take  Include the amounts, and when and why you take them  Bring the list or the pill bottles to follow-up visits  Carry your medicine list with you in case of an emergency  Self-care:   · Rinse your sinuses  Use a sinus rinse device to rinse your nasal passages with a saline (salt water) solution  This will help thin the mucus in your nose and rinse away pollen and dirt  It will also help reduce swelling so you can breathe normally  Ask your healthcare provider how often to do this  · Breathe in steam   Heat a bowl of water until you see steam  Lean over the bowl and make a tent over your head with a large towel  Breathe deeply for about 20 minutes  Be careful not to get too close to the steam or burn yourself  Do this 3 times a day  You can also breathe deeply when you take a hot shower  · Sleep with your head elevated  Place an extra pillow under your head before you go to sleep to help your sinuses drain  · Drink liquids as directed  Ask your healthcare provider how much liquid to drink each day and which liquids are best for you  Liquids will thin the mucus in your nose and help it drain   Avoid drinks that contain alcohol or caffeine  · Do not smoke, and avoid secondhand smoke  Nicotine and other chemicals in cigarettes and cigars can make your symptoms worse  Ask your healthcare provider for information if you currently smoke and need help to quit  E-cigarettes or smokeless tobacco still contain nicotine  Talk to your healthcare provider before you use these products  Follow up with your healthcare provider as directed: Follow up if your symptoms are worse or not better after 3 to 5 days of treatment  Write down your questions so you remember to ask them during your visits  © 2017 [employer] Information is for End User's use only and may not be sold, redistributed or otherwise used for commercial purposes  All illustrations and images included in CareNotes® are the copyrighted property of [employer]  or [employer]  The above information is an  only  It is not intended as medical advice for individual conditions or treatments  Talk to your doctor, nurse or pharmacist before following any medical regimen to see if it is safe and effective for you  Epistaxis   WHAT YOU SHOULD KNOW:   Epistaxis is a nosebleed  A nosebleed occurs when the blood vessels near the surface of the nasal cavity are injured or damaged  AFTER YOU LEAVE:   Medicines:   · Nasal sprays:  Vasoconstrictor nasal spray is a medicine that helps make nasal blood vessels narrower  This limits the blood flow and stops the bleeding  This medicine also decreases the swelling inside your nose and helps you breathe easier  You may also be directed to use saline or other nasal sprays to add moisture to your nose  · Antibiotics: This medicine is given to help treat or prevent an infection caused by bacteria  · Take your medicine as directed  Call your healthcare provider if you think your medicine is not helping or if you have side effects  Tell him if you are allergic to any medicine   Keep a list of the medicines, vitamins, and herbs you take  Include the amounts, and when and why you take them  Bring the list or the pill bottles to follow-up visits  Carry your medicine list with you in case of an emergency  Follow up with your primary healthcare provider or otolaryngologist within 2 to 3 days or as directed: Any packing in your nose should be removed within 2 to 3 days  Write down your questions so you remember to ask them during your visits  First aid:   · Sit up and lean forward: This will help prevent you from swallowing blood  Spit blood and saliva into a bowl  · Apply pressure to your nose:  Use 2 fingers to pinch your nose shut for 10 minutes  This will help stop the bleeding  Breathe through your mouth  · Apply ice:  Use a cold pack or put crushed ice in a bag, cover with a towel, and place on the bridge of your nose  · Nasal packing:  Pack your nose with a cotton ball, tissue, tampon, or gauze bandage to stop the bleeding  Prevent epistaxis:  · Avoid nose picking and blowing your nose too hard: You can irritate or damage your nose if you pick it  Blowing your nose too hard may cause the bleeding to start again  · Avoid irritants:  Substances that can irritate your nose should be avoided  These include tobacco smoke and chemical sprays such as  that contain ammonia  · Use a cool mist humidifier in your home: This will add the moisture to the air and help keep your nose moist      · Put a small amount of petroleum jelly inside your nostrils: You may apply a small amount of petroleum jelly if you do not have a nasal packing  This will help keep your nose from drying out or getting irritated  Do not put anything else inside your nose unless your primary healthcare provider tells you to do so  Contact your primary healthcare provider or otolaryngologist if:   · You have a fever and are vomiting       · You have pain in and around your nose that is getting worse even after you take pain medicines  · Your nasal pack is loose  · You have questions or concerns about your condition or care  Seek care immediately if:   · Your nasal packing is soaked with blood  · Your nose is still bleeding after 20 minutes, even after you pinch it  · You have a foul-smelling discharge coming out of your nose  · You feel so weak and dizzy that you have trouble standing up  · You have trouble breathing or talking  © 2014 [address] [employer] is for End User's use only and may not be sold, redistributed or otherwise used for commercial purposes  All illustrations and images included in CareNotes® are the copyrighted property of [employer]  or [employer]  The above information is an  only  It is not intended as medical advice for individual conditions or treatments  Talk to your doctor, nurse or pharmacist before following any medical regimen to see if it is safe and effective for you

## 2018-01-03 NOTE — ED PROVIDER NOTES
History  Chief Complaint   Patient presents with    Nose Bleed     Patient states that she was sleeping when she woke up coughing up blood  She states that when she stood up she noticed she had a nose bleed and she tasted blood all in her throat  Patient states that she was here on Dec  15 with pneumonia and is afraid that some of the blood that she coughed up may be from her lung  53-year-old white female presents for evaluation of nosebleed  Patient concerned because she woke up and coughed up some blood  Was concerned because she had a history of pneumonia was not sure if it was related  No active bleeding at this time  No pain, no fever, no known trauma, no sneezing  Nose Bleed   Location:  R nare  Severity:  Mild  Duration:  1 minute  Timing:  Intermittent  Progression:  Resolved  Chronicity:  New  Context: not bleeding disorder, not foreign body, not hypertension, not recent infection, not thrombocytopenia and not trauma    Relieved by:  Applying pressure  Worsened by:  Sneezing  Associated symptoms: blood in oropharynx, cough, sinus pain and sneezing    Associated symptoms: no congestion, no dizziness, no facial pain, no fever, no headaches, no sore throat and no syncope    Risk factors: sinus problems    Risk factors: no alcohol use, no allergies, no change in medication, no frequent nosebleeds, no head and neck surgery, no intranasal steroids and no recent nasal surgery        Prior to Admission Medications   Prescriptions Last Dose Informant Patient Reported? Taking? ALPRAZolam (XANAX) 0 25 mg tablet   Yes No   Sig: Take 0 125 mg by mouth daily as needed     acetaminophen (TYLENOL) 325 mg tablet   Yes No   Sig: Take 650 mg by mouth every 4 (four) hours as needed for mild pain     albuterol (PROVENTIL HFA,VENTOLIN HFA) 90 mcg/act inhaler   No No   Sig: Inhale 2 puffs every 4 (four) hours as needed for wheezing   cyclobenzaprine (FLEXERIL) 10 mg tablet   No No   Sig: Take 1 tablet by mouth 2 (two) times a day as needed for muscle spasms for up to 3 days   naproxen (NAPROSYN) 500 mg tablet   No No   Sig: Take 1 tablet by mouth 2 (two) times a day with meals for 5 days   naproxen (NAPROSYN) 500 mg tablet   No No   Sig: Take 1 tablet by mouth 2 (two) times a day with meals for 5 days      Facility-Administered Medications: None       Past Medical History:   Diagnosis Date    Acid reflux     Anxiety     Arthritis     Cancer ([address] )     skin on chin    Chronic kidney disease     (R) kidney smaller than (L),  kidney stones    Chronic UTI (urinary tract infection)     COPD (chronic obstructive pulmonary disease) (HCC)     mild, recent dx    Depression     [place] (hard of hearing)     deaf (L) ear, decreased hearing (R) ear    Hyperlipemia     diet controlled    Hyperlipidemia     IBS (irritable bowel syndrome)     Meniere disease        Past Surgical History:   Procedure Laterality Date    COLONOSCOPY N/A 11/4/2016    Procedure: COLONOSCOPY;  Surgeon: Yogi Santos MD;  Location: [facility] GI LAB; Service:     ESOPHAGOGASTRODUODENOSCOPY N/A 11/4/2016    Procedure: ESOPHAGOGASTRODUODENOSCOPY (EGD); Surgeon: Yogi Santos MD;  Location: [facility] GI LAB; Service:     EYE SURGERY       [address] CATARACT EXTRACAP,INSERT LENS Left 3/7/2016    Procedure: EXTRACTION EXTRACAPSULAR CATARACT PHACO INTRAOCULAR LENS (IOL); Surgeon: Maris Guillory MD;  Location: [facility] MAIN OR;  Service: Ophthalmology    SKIN CANCER EXCISION      excision skin cancer on chin       History reviewed  No pertinent family history  I have reviewed and agree with the history as documented  Social History   Substance Use Topics    Smoking status: Current Every Day Smoker     Packs/day: 1 00     Years: 43 00     Types: Cigarettes    Smokeless tobacco: Never Used    Alcohol use No        Review of Systems   Constitutional: Negative  Negative for chills and fever  HENT: Positive for nosebleeds, sinus pain and sneezing  Negative for congestion, facial swelling and sore throat  Eyes: Negative  Respiratory: Positive for cough  Negative for shortness of breath, wheezing and stridor  Cardiovascular: Negative for chest pain, palpitations, leg swelling and syncope  Gastrointestinal: Negative  Endocrine: Negative  Genitourinary: Negative  Musculoskeletal: Negative  Skin: Negative  Neurological: Negative  Negative for dizziness and headaches  Hematological: Negative  Psychiatric/Behavioral: Negative  All other systems reviewed and are negative  Physical Exam  ED Triage Vitals [01/03/18 0119]   Temperature Pulse Respirations Blood Pressure SpO2   97 7 °F (36 5 °C) 98 18 141/65 98 %      Temp Source Heart Rate Source Patient Position - Orthostatic VS BP Location FiO2 (%)   Oral Monitor Lying Right arm --      Pain Score       3           Orthostatic Vital Signs  Vitals:    01/03/18 0119   BP: 141/65   Pulse: 98   Patient Position - Orthostatic VS: Lying       Physical Exam   Constitutional: She is oriented to person, place, and time  She appears well-developed and well-nourished  HENT:   Head: Normocephalic and atraumatic  Right Ear: External ear normal    Left Ear: External ear normal    Nose: No rhinorrhea or nasal septal hematoma  No foreign bodies  Mouth/Throat: Oropharynx is clear and moist    Dried blood in the nares   Eyes: Conjunctivae and EOM are normal    Neck: Normal range of motion  Neck supple  Cardiovascular: Normal rate, regular rhythm, normal heart sounds and intact distal pulses  Pulmonary/Chest: Effort normal and breath sounds normal    Abdominal: Soft  Bowel sounds are normal    Musculoskeletal: Normal range of motion  Neurological: She is alert and oriented to person, place, and time  Skin: Skin is warm and dry  Capillary refill takes less than 2 seconds  Psychiatric: She has a normal mood and affect   Her behavior is normal  Judgment and thought content normal  Nursing note and vitals reviewed  ED Medications  Medications - No data to display    Diagnostic Studies  Results Reviewed     None                 No orders to display              Procedures  Procedures       Phone Contacts  ED Phone Contact    ED Course  ED Course                                MDM  CritCare Time    Disposition  Final diagnoses:   Epistaxis   Sinusitis     Time reflects when diagnosis was documented in both MDM as applicable and the Disposition within this note     Time User Action Codes Description Comment    1/3/2018  2:46 AM Urban Hoyos Add [R04 0] Epistaxis     1/3/2018  2:56 AM Urban Reyes Add [J32 9] Sinusitis       ED Disposition     ED Disposition Condition Comment    Discharge  Ori Aranda discharge to home/self care  Condition at discharge: Good        Follow-up Information     Follow up With Specialties Details Why Erasmo Watts MD Internal Medicine Schedule an appointment as soon as possible for a visit As needed [phone]    [address] [facility] [address]  [phone]          Discharge Medication List as of 1/3/2018  3:03 AM      START taking these medications    Details   cefixime (SUPRAX) 400 MG tablet Take 1 tablet by mouth daily for 10 days, Starting Wed 1/3/2018, Until Sat 1/13/2018, Print         CONTINUE these medications which have NOT CHANGED    Details   acetaminophen (TYLENOL) 325 mg tablet Take 650 mg by mouth every 4 (four) hours as needed for mild pain , Historical Med      albuterol (PROVENTIL HFA,VENTOLIN HFA) 90 mcg/act inhaler Inhale 2 puffs every 4 (four) hours as needed for wheezing, Starting Sat 12/16/2017, Print      ALPRAZolam (XANAX) 0 25 mg tablet Take 0 125 mg by mouth daily as needed  , Starting 9/25/2008, Until Discontinued, Historical Med      cyclobenzaprine (FLEXERIL) 10 mg tablet Take 1 tablet by mouth 2 (two) times a day as needed for muscle spasms for up to 3 days, Starting Sat 9/16/2017, Until Tue 9/19/2017, Print      naproxen (NAPROSYN) 500 mg tablet Take 1 tablet by mouth 2 (two) times a day with meals for 5 days, Starting Sat 9/16/2017, Until Thu 9/21/2017, Print      naproxen (NAPROSYN) 500 mg tablet Take 1 tablet by mouth 2 (two) times a day with meals for 5 days, Starting Sat 12/16/2017, Until u 12/21/2017, Print           No discharge procedures on file      ED Provider  Electronically Signed by           Brittney Mixon MD  01/07/18 9515

## 2018-01-10 ENCOUNTER — TRANSCRIBE ORDERS (OUTPATIENT)
Dept: ADMINISTRATIVE | Facility: HOSPITAL | Age: 63
End: 2018-01-10

## 2018-01-10 ENCOUNTER — HOSPITAL ENCOUNTER (OUTPATIENT)
Dept: RADIOLOGY | Facility: HOSPITAL | Age: 63
Discharge: HOME | End: 2018-01-10
Payer: MEDICARE

## 2018-01-10 ENCOUNTER — GENERIC CONVERSION - ENCOUNTER (OUTPATIENT)
Dept: OTHER | Facility: OTHER | Age: 63
End: 2018-01-10

## 2018-01-10 ENCOUNTER — ALLSCRIPTS OFFICE VISIT (OUTPATIENT)
Dept: OTHER | Facility: OTHER | Age: 63
End: 2018-01-10

## 2018-01-10 DIAGNOSIS — R91.1 LUNG NODULE: Primary | ICD-10-CM

## 2018-01-10 DIAGNOSIS — Z12.31 ENCOUNTER FOR SCREENING MAMMOGRAM FOR MALIGNANT NEOPLASM OF BREAST: ICD-10-CM

## 2018-01-10 PROCEDURE — 77067 SCR MAMMO BI INCL CAD: CPT

## 2018-01-10 NOTE — PROGRESS NOTES
Assessment    1  Low back pain (724 2) (M54 5)    Plan  Low back pain    · XR SPINE LUMBAR COMPLETE W BENDING MINIMUM 6 VIEWS; Status:Active; Requested for:[id number];    · *1 - [facility] Physician Referral  Consult  Status: Active   Requested for: [id number]  Care Summary provided  : Yes   · *1 - [facility] Physical Therapy  Consult  Status: Active  Requested for:  [id number]  Care Summary provided  : Yes    Discussion/Summary    62 yo F presents with worsening chronic low back pain  Pt given slip for XR lumbar spine and physical therapy  Advised her to try Motrin for pain relief since Tylenol is not helping  If symptoms worsen, pt told to come back  Chief Complaint  low back pain      History of Present Illness  HPI: Manisha Akins is a 62 yo F who presents today with c/o worsening chronic low back pain x 8 months  Pain is in the lower back, radiates around to lower quadrants of abdomen and groin bilaterally, shoots down L leg leading to L leg numbness and tingling  Pt says she can't sit or stand for long periods and has to constantly change positions  The pain is intermittent, occurs about once a week, is 8/10 in intensity, throbbing in quality  She has tried Tylenol with no relief but no other medications  She has pressure in her groin, especially on standing  Normal bowel and bladder control  Pt had similar symptoms in 2008 for which she saw a neurologist, and was told she had narrowing in neck and back on MRI  Saw gynecologist to make sure groin and abdominal issues are not GYN related and she was assured that they are not  Has hx of arthritis  Saw rheumatologist in 2006 and had some arthritis back then but doesn't take any medications  Active Problems    1  Abdominal pain (789 00) (R10 9)   2  Benign paroxysmal positional vertigo of left ear (386 11) (H81 12)   3  Bloating (787 3) (R14 0)   4  Body mass index (BMI) 23 0-23 9, adult (V85 1) (Z68 23)   5   Change in bowel habits (787 99) (R19 4)   6  Depression with anxiety (300 4) (F41 8)   7  Encounter for screening colonoscopy (V76 51) (Z12 11)   8  Hematuria (599 70) (R31 9)   9  History of nephrolithiasis (V13 01) (Z87 442)   10  Low back pain (724 2) (M54 5)   11  Meniere disease (386 00) (H81 09)   12  Microscopic colitis (558 9) (K52 839)   13  Nausea and/or vomiting (787 01) (R11 2)   14  Non-healing non-surgical wound (879 9) (T14 8)    Past Medical History    1  History of Anxiety (300 00) (F41 9)   2  Delivery normal (650) (O80,Z37 9)   3  History of Ear pain, left (388 70) (H92 02)   4  History of Epidermal cyst of face (706 2) (L72 0)   5  History of backache (V13 59) (Z87 39)   6  History of chronic obstructive lung disease (V12 69) (Z87 09)   7  History of depression (V11 8) (Z86 59)   8  History of hypercholesterolemia (V12 29) (Z86 39)   9  History of uterine leiomyoma (V13 29) (Z86 018)   10  Personal history of malignant neoplasm of skin (V10 83) (V12 324)    Family History  Mother    1  Denied: Family history of Colon cancer   2  Denied: Family history of Crohn's disease without complication, unspecified   gastrointestinal tract location   3  Family history of cardiac disorder (V17 49) (Z82 49)   4  Denied: Family history of liver disease  Father    11  Denied: Family history of Colon cancer   6  Denied: Family history of Crohn's disease without complication, unspecified   gastrointestinal tract location   7  Denied: Family history of liver disease  Sister    6  Family history of colonic polyps (V18 51) (Z83 71)  Grandparent    9  Family history of osteoporosis (V17 81) (Z82 62)  Grandmother    10  Family history of Colon cancer   11  Family history of cardiac disorder (V17 49) (Z82 49)  Aunt    12  Family history of cardiac disorder (V17 49) (Z82 49)  Cousin    15  Family history of malignant neoplasm of breast (V16 3) (Z80 3)  Family History    14  Family history of Brain tumor   15   Family history of colon cancer (V16 0) (Z80 0)   16  Family history of lung cancer (V16 1) (Z80 1)   17  Family history of malignant neoplasm (V16 9) (Z80 9)   18  Family history of malignant neoplasm of breast (V16 3) (Z80 3)   19  Family history of Malignant neoplasm of anterior wall of urinary bladder    Social History    · Denied: History of Alcohol use   · Always uses seat belt   · No drug use   · Smokes tobacco daily (305 1) (Z72 0)    Surgical History    1  History of Complete Colonoscopy   2  History of Diagnostic Esophagogastroduodenoscopy   3  History of Endoscopic Retrograde Cholangiopancreatography (ERCP)    Allergies    1  Ampicillin CAPS   2  Axid AR TABS   3  Benadryl Allergy CAPS   4  Cipro Cystitis TABS   5  Clindamycin HCl CAPS   6  Erythromycin TABS   7  Macrolides   8  Penicillins   9  Wellbutrin TABS    Vitals   Recorded: [id number] 10:33AM   Heart Rate 86   Respiration 18   Systolic 601   Diastolic 70   Height 5 ft 2 in   Weight 142 lb    BMI Calculated 25 97   BSA Calculated 1 65   O2 Saturation 95     Physical Exam    Constitutional   General appearance: No acute distress, well appearing and well nourished  Pulmonary   Respiratory effort: No increased work of breathing or signs of respiratory distress  Auscultation of lungs: Clear to auscultation  Cardiovascular   Palpation of heart: Normal PMI, no thrills  Auscultation of heart: Normal rate and rhythm, normal S1 and S2, without murmurs  Examination of extremities for edema and/or varicosities: Normal     Musculoskeletal   Gait and station: Normal     Inspection/palpation of joints, bones, and muscles: Abnormal   Tenderness to palpation of lumbar spinal area  Decreased ROM with pain on bending forward, backward and side to side  Neurologic   Cranial nerves: Cranial nerves 2-12 intact  Reflexes: 2+ and symmetric  Sensation: No sensory loss      Psychiatric   Orientation to person, place, and time: Normal     Mood and affect: Normal  Signatures   Electronically signed by :  Gasper Bernheim, MD; Feb  3 2017  2:19PM EST                       (Author)    Electronically signed by : ANTOLIN Ramirez ; Mar 10 2017  1:48PM EST

## 2018-01-12 VITALS
WEIGHT: 146 LBS | BODY MASS INDEX: 26.87 KG/M2 | SYSTOLIC BLOOD PRESSURE: 128 MMHG | DIASTOLIC BLOOD PRESSURE: 78 MMHG | HEIGHT: 62 IN | HEART RATE: 85 BPM

## 2018-01-12 NOTE — RESULT NOTES
Message   spoke to patient about the biopsy results positive for microscopic colitis  She still having diarrhea  Prescription was sent for Entocort      Office visit in one month**     Verified Results  (1) TISSUE EXAM [id number] 11:18AM Shara De Souza     Test Name Result Flag Reference   LAB AP CASE REPORT (Report)     Surgical Pathology Report             Case: [id number]                   Authorizing Provider: Mary Ellen Bakre MD     Collected:      11/04/2016 1118        Ordering Location:   [facility]  Received:      11/04/2016 20[facility]                                     Pathologist:      Devora Perez MD                                 Specimens:  A) - Stomach, Cold biopsy gastric body                                 B) - Duodenum, Cold biopsy duodenum                                  C) - Colon, Random colon biopsies colon   LAB AP FINAL DIAGNOSIS (Report)     A  Gastric body (biopsy):    - Gastric oxyntic and foveolar mucosa with no pathologic abnormality      - Immunostain for H  pylori (with appropriate positive control) is   negative  - No intestinal metaplasia, dysplasia or neoplasia identified  B  Duodenum (biopsy):    - Small bowel mucosa with no significant pathologic abnormalities  - No villous atrophy, increased intraepithelial lymphocytes or crypt   hyperplasia to suggest     malabsorptive enteropathy     - No active inflammation, granulomas, organisms, dysplasia or neoplasia   identified  C  Colon (random biopsy):    - Colonic mucosa with variably thickened, irregular basement membrane   and associated mild chronic inflammation     - No granulomas, dysplasia or neoplasia identified      - See note     Electronically signed by Devora Perez MD on 11/10/2016 at 2:43 PM  Preliminary result electronically signed by Devora Perez MD on 11/8/2016 at 2:35 PM   LAB AP MICROSCOPIC DESCRIPTION      - A trichrome stain performed on the random colon biopsy (part C, with   appropriate controls) supports the above diagnosis  LAB AP NOTE      - The findings in the random colon biopsy (part C) may represent   microscopic (collagenous) colitis in the appropriate clinical and   endoscopic context  - Interpretation performed at [facility], [address] [place] [address]  LAB AP SURGICAL ADDITIONAL INFORMATION (Report)     These tests were developed and their performance characteristics   determined by [facility] or [employer]  They may not be cleared or approved by the [place]  Food and   Drug Administration  The FDA has determined that such clearance or   approval is not necessary  These tests are used for clinical purposes  They should not be regarded as investigational or for research  This   laboratory has been approved by [facility], designated as a high-complexity   laboratory and is qualified to perform these tests  LAB AP GROSS DESCRIPTION (Report)     A  The specimen is received in formalin, labeled with the patient's name   and hospital number, and is designated cold biopsy gastric body, are two   irregularly shaped fragments of tan soft tissue measuring 0 6 and 0 2 cm   in greatest dimension  Entirely submitted  One cassette  B  The specimen is received in formalin, labeled with the patient's name   and hospital number, and is designated cold biopsy duodenum, are   multiple irregularly shaped fragments of tan-pink soft tissue measuring   1 2 x 0 4 x 0 1 cm in aggregate  Entirely submitted  One cassette  C  The specimen is received in formalin, labeled with the patient's name   and hospital number, and is designated random colon biopsies, are   multiple irregularly shaped fragments of tan-pink soft tissue measuring   1 3 x 0 6 x 0 1 cm in aggregate  Entirely submitted  One cassette      Note: The estimated total formalin fixation time based upon information   provided by the submitting clinician and the standard processing schedule   is 17 0 hours  RLR   LAB AP CLINICAL INFORMATION      Abdominal pain  ??? Change in bowel habits         Plan  Microscopic colitis    · Budesonide 3 MG Oral Capsule Delayed Release Particles (Entocort EC); take 3  capsule daily

## 2018-01-14 VITALS
DIASTOLIC BLOOD PRESSURE: 70 MMHG | OXYGEN SATURATION: 95 % | WEIGHT: 142 LBS | HEART RATE: 86 BPM | RESPIRATION RATE: 18 BRPM | BODY MASS INDEX: 26.13 KG/M2 | HEIGHT: 62 IN | SYSTOLIC BLOOD PRESSURE: 118 MMHG

## 2018-01-15 NOTE — MISCELLANEOUS
Message  Message Free Text Note Form: I called patient and left message stating I am unsure why ENT could not write referral for [facility] center  I want to make sure I know what the referral is for and asked her to call me tomorrow in office as I have never seen patient  Signatures   Electronically signed by : Phill Matamoros DO; Feb 8 2016  8:49AM EST                       (Author)    Electronically signed by :  ANTOLIN Velazquez ; Feb 8 2016  9:51AM EST                       (Acknowledgement)

## 2018-01-15 NOTE — MISCELLANEOUS
Message  Message Free Text Note Form: Message Received @: 8:19Am  Message Return @: 8:20 Am  Message: Lower back pain/Sciatic Nerve/Can not walk/Very painful  Plan: Pt was advised to go to the hospital since she has fecal incontinence  Pt states its expensive to go to the hospital , if it get worse she will go to the hospital  Pt was advised to come to [facility] on Monday morning        Signatures   Electronically signed by : ANTOLIN Do ; Apr 10 2016 11:06AM EST                       (Author)    Electronically signed by : CHIP Carcamo ; Apr 10 2016  5:30PM EST                       (Author)

## 2018-01-17 ENCOUNTER — HOSPITAL ENCOUNTER (OUTPATIENT)
Dept: RADIOLOGY | Facility: HOSPITAL | Age: 63
Discharge: HOME | End: 2018-01-17
Payer: MEDICARE

## 2018-01-17 ENCOUNTER — GENERIC CONVERSION - ENCOUNTER (OUTPATIENT)
Dept: OTHER | Facility: OTHER | Age: 63
End: 2018-01-17

## 2018-01-17 DIAGNOSIS — R92.8 ABNORMAL SCREENING MAMMOGRAM: ICD-10-CM

## 2018-01-17 PROCEDURE — 77065 DX MAMMO INCL CAD UNI: CPT

## 2018-01-19 ENCOUNTER — ALLSCRIPTS OFFICE VISIT (OUTPATIENT)
Dept: OTHER | Facility: OTHER | Age: 63
End: 2018-01-19

## 2018-01-19 ENCOUNTER — TRANSCRIBE ORDERS (OUTPATIENT)
Dept: ADMINISTRATIVE | Facility: HOSPITAL | Age: 63
End: 2018-01-19

## 2018-01-19 ENCOUNTER — HOSPITAL ENCOUNTER (OUTPATIENT)
Dept: RADIOLOGY | Facility: HOSPITAL | Age: 63
Discharge: HOME | End: 2018-01-19
Payer: MEDICARE

## 2018-01-19 ENCOUNTER — GENERIC CONVERSION - ENCOUNTER (OUTPATIENT)
Dept: OTHER | Facility: OTHER | Age: 63
End: 2018-01-19

## 2018-01-19 DIAGNOSIS — R07.9 CHEST PAIN: ICD-10-CM

## 2018-01-19 PROCEDURE — 71046 X-RAY EXAM CHEST 2 VIEWS: CPT

## 2018-01-22 VITALS
DIASTOLIC BLOOD PRESSURE: 77 MMHG | WEIGHT: 150 LBS | HEIGHT: 62 IN | SYSTOLIC BLOOD PRESSURE: 128 MMHG | HEART RATE: 87 BPM | BODY MASS INDEX: 27.6 KG/M2

## 2018-01-22 VITALS
OXYGEN SATURATION: 96 % | HEART RATE: 72 BPM | SYSTOLIC BLOOD PRESSURE: 148 MMHG | BODY MASS INDEX: 29.63 KG/M2 | WEIGHT: 161 LBS | DIASTOLIC BLOOD PRESSURE: 72 MMHG | RESPIRATION RATE: 18 BRPM | TEMPERATURE: 98.1 F | HEIGHT: 62 IN

## 2018-01-22 VITALS
OXYGEN SATURATION: 96 % | RESPIRATION RATE: 16 BRPM | HEIGHT: 62 IN | HEART RATE: 96 BPM | BODY MASS INDEX: 27.6 KG/M2 | DIASTOLIC BLOOD PRESSURE: 82 MMHG | WEIGHT: 150 LBS | SYSTOLIC BLOOD PRESSURE: 122 MMHG | TEMPERATURE: 97.4 F

## 2018-01-22 VITALS
BODY MASS INDEX: 27.79 KG/M2 | DIASTOLIC BLOOD PRESSURE: 70 MMHG | HEART RATE: 78 BPM | SYSTOLIC BLOOD PRESSURE: 126 MMHG | HEIGHT: 62 IN | WEIGHT: 151 LBS | TEMPERATURE: 96.5 F | OXYGEN SATURATION: 98 % | RESPIRATION RATE: 16 BRPM

## 2018-01-22 VITALS
BODY MASS INDEX: 27.6 KG/M2 | SYSTOLIC BLOOD PRESSURE: 130 MMHG | HEIGHT: 62 IN | DIASTOLIC BLOOD PRESSURE: 86 MMHG | WEIGHT: 150 LBS

## 2018-01-22 LAB
A/G RATIO (HISTORICAL): 1.9 (ref 1.2–2.2)
ALBUMIN SERPL BCP-MCNC: 4.3 G/DL (ref 3.6–4.8)
ALP SERPL-CCNC: 99 IU/L (ref 39–117)
ALT SERPL W P-5'-P-CCNC: 17 IU/L (ref 0–32)
AMYLASE (HISTORICAL): 58 U/L (ref 31–124)
AST SERPL W P-5'-P-CCNC: 21 IU/L (ref 0–40)
BASOPHILS # BLD AUTO: 0.1 X10E3/UL (ref 0–0.2)
BASOPHILS # BLD AUTO: 1 %
BILIRUB SERPL-MCNC: 0.2 MG/DL (ref 0–1.2)
BUN SERPL-MCNC: 13 MG/DL (ref 8–27)
BUN/CREA RATIO (HISTORICAL): 16 (ref 12–28)
CALCIUM SERPL-MCNC: 9.8 MG/DL (ref 8.7–10.3)
CHLORIDE SERPL-SCNC: 103 MMOL/L (ref 96–106)
CO2 SERPL-SCNC: 24 MMOL/L (ref 18–29)
CREAT SERPL-MCNC: 0.81 MG/DL (ref 0.57–1)
DEPRECATED RDW RBC AUTO: 13.9 % (ref 12.3–15.4)
EGFR AFRICAN AMERICAN (HISTORICAL): 91 ML/MIN/1.73
EGFR-AMERICAN CALC (HISTORICAL): 79 ML/MIN/1.73
EOSINOPHIL # BLD AUTO: 0.2 X10E3/UL (ref 0–0.4)
EOSINOPHIL # BLD AUTO: 2 %
GLUCOSE SERPL-MCNC: 86 MG/DL (ref 65–99)
HCT VFR BLD AUTO: 41.8 % (ref 34–46.6)
HGB BLD-MCNC: 13.7 G/DL (ref 11.1–15.9)
IMM.GRANULOCYTES (CD4/8) (HISTORICAL): 0 %
IMM.GRANULOCYTES (CD4/8) (HISTORICAL): 0 X10E3/UL (ref 0–0.1)
INR PPP: 1 (ref 0.8–1.2)
LIPASE SERPL-CCNC: 73 U/L (ref 14–72)
LYMPHOCYTES # BLD AUTO: 2.1 X10E3/UL (ref 0.7–3.1)
LYMPHOCYTES # BLD AUTO: 25 %
MCH RBC QN AUTO: 28.8 PG (ref 26.6–33)
MCHC RBC AUTO-ENTMCNC: 32.8 G/DL (ref 31.5–35.7)
MCV RBC AUTO: 88 FL (ref 79–97)
MONOCYTES # BLD AUTO: 0.4 X10E3/UL (ref 0.1–0.9)
MONOCYTES (HISTORICAL): 6 %
NEUTROPHILS # BLD AUTO: 5.3 X10E3/UL (ref 1.4–7)
NEUTROPHILS # BLD AUTO: 66 %
PLATELET # BLD AUTO: 245 X10E3/UL (ref 150–379)
POTASSIUM SERPL-SCNC: 4.7 MMOL/L (ref 3.5–5.2)
PROTHROMBIN TIME: 10.3 SEC (ref 9.1–12)
RBC (HISTORICAL): 4.76 X10E6/UL (ref 3.77–5.28)
SODIUM SERPL-SCNC: 143 MMOL/L (ref 134–144)
TOT. GLOBULIN, SERUM (HISTORICAL): 2.3 G/DL (ref 1.5–4.5)
TOTAL PROTEIN (HISTORICAL): 6.6 G/DL (ref 6–8.5)
WBC # BLD AUTO: 8.1 X10E3/UL (ref 3.4–10.8)

## 2018-01-23 VITALS
HEIGHT: 62 IN | WEIGHT: 158 LBS | DIASTOLIC BLOOD PRESSURE: 80 MMHG | SYSTOLIC BLOOD PRESSURE: 166 MMHG | OXYGEN SATURATION: 100 % | HEART RATE: 108 BPM | BODY MASS INDEX: 29.08 KG/M2 | TEMPERATURE: 97.4 F | RESPIRATION RATE: 18 BRPM

## 2018-01-23 LAB — TROPONIN I SERPL-MCNC: <0.01 NG/ML (ref 0–0.04)

## 2018-01-23 NOTE — CONSULTS
Assessment    1  Lung nodule (793 11) (R91 1)   2  Smokes tobacco daily (305 1) (Z72 0)   3  Bullous emphysema (492 0) (J43 9)    Plan  Lung nodule    · * NM PET CT SKULL BASE TO MID THIGH; Status:Need Information - Financial  Authorization; Requested NNZ:[id number]; Perform:[facility]; CRX:[id number];EJELMER; For:Lung nodule; Ordered By:Karely Hutton; Results/Data  PFT Results v2:     Spirometry: Forced vital capacity: 2 51L and 85% Predicted Values  Forced expiratory volume in one second: 1 84L and 81% Predicted Value  FEV1/FVC ratio is 73  Post Bronchodilator Spirometry:   Lung Volumes:   DLCO:    PFT Interpretation:   normal spirometry  Discussion/Summary  Discussion Summary:   Karely Layton has a pulmonary nodule that was 1st seen on CT of chest in January 2015  It appeared that time to be 1 mm in the right lower lobe and subsequent PET-CT was done at [facility] no suspicious hypermetabolic foci was seen  She had CTA of chest done at [facility] December 2017  A right lower lobe 1 4 cm pulmonary nodule was seen, concerning for malignancy  PET-CT will be done  Patient defers CT-guided biopsy  She is a smoker and smoking cessation was advised  Review of CTA of chest did show some emphysematous changes  The nodule is smooth and we do not have CD ROM of images from [facility]   PFTs were done during this visit  Essentially normal flow volume loop showed some mild restrictive pattern otherwise normal   Follow-up will be after PET-CT  Goals and Barriers: The patient has the current Goals: Patient will be smoke-free    She is agreeable to cut down on her side cigarette smoking  Patient's Capacity to Self-Care: Patient is able to Self-Care        Active Problems    · Benign paroxysmal positional vertigo of left ear (386 11) (H81 12)   · Bloating (787 3) (R14 0)   · Body mass index (BMI) 23 0-23 9, adult (V85 1) (L28 67)   · Change in bowel habits (787 99) (R19 4)   · Charl Bianca tenosynovitis, right (727 04) (M65 4)   · Depression with anxiety (300 4) (F41 8)   · Hand tendonitis (727 05) (M77 8)   · Hematuria (599 70) (R31 9)   · History of nephrolithiasis (V13 01) (Z87 442)   · Left facial numbness (782 0) (R20 0)   · Low back pain (724 2) (M54 5)   · Lung nodule (793 11) (R91 1)   · Meniere disease (386 00) (H81 09)   · Microscopic colitis (558 9) (K52 839)   · Neck pain (723 1) (M54 2)   · Non-healing non-surgical wound (879 9) (T14 8XXA)   · Piriformis syndrome, left (355 0) (G57 02)   · Pneumonia (486) (J18 9)   · Right facial pain (784 0) (R51)   · Trigeminal neuralgia (350 1) (G50 0)   · Wheezing (786 07) (R06 2)    Chief Complaint  Chief Complaint Free Text Note Form: Nikolay Balderas states she is here for consult lung nodule referred by Thierno Friday      History of Present Illness  HPI: Nikolay Balderas is a 63-year-old female who is here today for evaluation for lung nodule  She was seen by her primary care physician in December 27, 2017 for pneumonia and was treated with Bactrim  She had gone to the emergency room at [address] [place] [address] on December 15th for left t-sided chest pain and pain with inspiration  She had cough and CTA of the chest was done  There was no evidence of acute pulmonary embolus  She did have a right lower lobe 1 4 cm nodule that was concerning for malignancy  Also a left thyroid nodule  And airspace opacification in the lingula which represented likely mild pneumonia  She was treated with doxycycline  She had CT scan of the chest in January 21, 2015 at [facility]  At that time there was an indeterminate nodule of the right lower lobe  This had corresponded to findings on patient's chest x-ray  Further evaluation was indicated  There was also several small on tiny nodules  PET-CT was done at [facility] on January 30, 2015  No suspicious hypermetabolic foci was noted  The present CT of the chest was examined by myself and Dr Paul Link  is a current smoker  He smoked for 45+ years, 1 pack per day  Yee Paredes does report wheezing but has no exertional dyspnea or cough  She was sent here from her primary care physician for the evaluation of the aforementioned CT scan abnormality of the chest   Yee Paredes did have chest x-ray on December 27, 2017 in the ER there was a 1 7 cm nodule at the right lung base without any infiltrates and minimal atelectasis of the left lung base  Yee Paredes did present to the ER on January 3, 2018 for nose bleed  She had woken up an coughed up blood  She was prescribed Suprax 400 mg daily for 10 days  She is also taking she did not take this but instead continued Bactrim which was prescribed by her primary care physician for pneumonia  She is also using Flonase nasal spray 1 spray in each nostril for 2 or 3 days  Review of Systems  Complete-Female - Pulm:   Constitutional: No fever, no chills, feels well, no tiredness, no recent weight gain or weight loss  Eyes: no complaints of vision problems  ENT: no rhinitis, no PND, no epistaxis    The patient presents with complaints of sudden onset of intermittent episodes of moderate bilateral nostril nosebleeds  Her symptoms are caused by no known event  Symptoms are unchanged  Cardiovascular: no palpitations, no chest pain  Respiratory: shortness of breath during exertion, but as noted in HPI  Gastrointestinal: no complaints of esophageal reflux, no abdominal pain  Genitourinary: no dysuria  Musculoskeletal: no arthralgias, no joint swelling, no myalgias  Integumentary: no rash, no lesions  Neurological: no headache, no fainting, no weakness  Psychiatric: no anxiety, no depression  Hematologic/Lymphatic: - no complaints of swollen glands  Past Medical History    1  History of Anxiety (300 00) (F41 9)   2  Delivery normal (650) (O80,Z37 9)   3  History of Ear pain, left (388 70) (H92 02)   4  History of Encounter for screening colonoscopy (V76 51) (Z12 11)   5   History of Epidermal cyst of face (706 2) (L72 0)   6  History of abdominal pain (V13 89) (Z87 898)   7  History of backache (V13 59) (Z87 39)   8  History of chronic obstructive lung disease (V12 69) (Z87 09)   9  History of depression (V11 8) (Z86 59)   10  History of hypercholesterolemia (V12 29) (Z86 39)   11  History of nausea and vomiting (V12 79) (Z87 898)   12  History of uterine leiomyoma (V13 29) (Z86 018)   13  Personal history of malignant neoplasm of skin (V10 83) (Z48 779)    Surgical History    1  History of Complete Colonoscopy   2  History of Diagnostic Esophagogastroduodenoscopy   3  History of Endoscopic Retrograde Cholangiopancreatography (ERCP)  Surgical History Reviewed: The surgical history was reviewed and updated today  Family History  Mother    1  Denied: Family history of Colon cancer   2  Denied: Family history of Crohn's disease without complication, unspecified   gastrointestinal tract location   3  Family history of cardiac disorder (V17 49) (Z82 49)   4  Denied: Family history of liver disease  Father    11  Denied: Family history of Colon cancer   6  Denied: Family history of Crohn's disease without complication, unspecified   gastrointestinal tract location   7  Denied: Family history of liver disease  Sister    6  Family history of colonic polyps (V18 51) (Z83 71)  Grandparent    9  Family history of osteoporosis (V17 81) (Z82 62)  Grandmother    10  Family history of Colon cancer   11  Family history of cardiac disorder (V17 49) (Z82 49)  Aunt    12  Family history of cardiac disorder (V17 49) (Z82 49)  Cousin    15  Family history of malignant neoplasm of breast (V16 3) (Z80 3)  Family History    14  Family history of Brain tumor   15  Family history of colon cancer (V16 0) (Z80 0)   16  Family history of lung cancer (V16 1) (Z80 1)   17  Family history of malignant neoplasm (V16 9) (Z80 9)   18  Family history of malignant neoplasm of breast (V16 3) (Z80 3)   19   Family history of Malignant neoplasm of anterior wall of urinary bladder  Family History Reviewed: The family history was reviewed and updated today  Social History    · Denied: History of Alcohol use   · Always uses seat belt   · No drug use   · Smokes tobacco daily (305 1) (Z72 0)  Social History Reviewed: The social history was reviewed and updated today  Current Meds   1  Albuterol Sulfate (2 5 MG/3ML) 0 083% Inhalation Nebulization Solution; USE 1 UNIT   DOSE IN NEBULIZER EVERY 4 HOURS AS NEEDED; Therapy: 94YTI3901 to (Last Rx:57Frs8132)  Requested for: 57Kvb6488 Ordered   2  Motrin  MG Oral Tablet; TAKE 1 TABLET 3 TIMES DAILY AS NEEDED; Therapy: 28TMF9364 to (Evaluate:17Oct2017); Last Rx:16Oct2017 Ordered   3  Pocket Spacer Device; use as directed; Therapy: 75VRT7899 to (Last Rx:46Aiy7062)  Requested for: 77Rkb5185 Ordered   4  Sulfamethoxazole-Trimethoprim 800-160 MG Oral Tablet; TAKE 1 TABLET TWICE DAILY   FOR 10 DAYS; Therapy: 93ZOM5778 to (Last Rx:86Tua4612)  Requested for: 30Yxl0546 Ordered  Medication List Reviewed: The medication list was reviewed and updated today  Allergies    1  Ampicillin CAPS   2  Axid AR TABS   3  Benadryl Allergy CAPS   4  Cipro Cystitis TABS   5  Clindamycin HCl CAPS   6  Erythromycin TABS   7  Macrolides   8  Penicillins   9  Wellbutrin TABS    Vitals  Vital Signs    Recorded: 46PLZ8206 12:53PM   Temperature 98 1 F, Oral   Heart Rate 72   Pulse Quality Normal   Respiration 18   Systolic 224, LUE, Sitting   Diastolic 72, LUE, Sitting   Height 5 ft 2 in   Weight 161 lb    BMI Calculated 29 45   BSA Calculated 1 74   O2 Saturation 96, RA     Physical Exam    Constitutional   General appearance: No acute distress, well appearing and well nourished  Ears, Nose, Mouth, and Throat   Nasal mucosa, septum, and turbinates: Normal without edema or erythema  Lips, teeth, and gums: Normal, good dentition  Oropharynx: Normal with no erythema, edema, exudate or lesions      Neck Neck: Supple, symmetric, trachea midline, no masses  Jugular veins: Normal     Pulmonary   Auscultation of lungs: Clear to auscultation, no rales, no crackles, no wheezing  Cardiovascular   Auscultation of heart: Normal rate and rhythm, normal S1 and S2, no murmurs  Examination of extremities for edema and/or varicosities: Normal     Abdomen   Abdomen: Soft, non-tender  Lymphatic   Palpation of lymph nodes in neck: No lymphadenopathy  Musculoskeletal   Gait and station: Normal     Digits and nails: Normal without clubbing or cyanosis  Neurologic   Mental Status: Normal  Not confused, no evidence of dementia, good comprehension, good concentration  Skin   Skin and subcutaneous tissue: Limited exam shows no rash      Psychiatric   Orientation to person, place and time: Normal     Mood and affect: Normal        Signatures   Electronically signed by : KYLEE Mays; Alan 10 2018  1:50PM EST                       (Author)

## 2018-01-23 NOTE — MISCELLANEOUS
Message  Left message on voicemail for patient to call me back    Spoke with patient in regards to CT of chest and x-ray result  Patient reports she had a PET scan in February 2015  Denies that there is any cancer at that time  Patient is still an active smoker  Advised patient that she should follow up with Oncology for a potential PET scan  Left referral for Dr Janak Hobbs at front for patient to   1        1 Amended By: Laurie Montague; Jan 02 2018 11:13 AM EST    Plan  Pneumonia    · Start: Sulfamethoxazole-Trimethoprim 800-160 MG Oral Tablet; TAKE 1 TABLET TWICE  DAILY FOR 10 DAYS   · Eat small frequent meals ; Status:Complete;   Done: 25YFQ8756  Wheezing    · Start: Albuterol Sulfate (2 5 MG/3ML) 0 083% Inhalation Nebulization Solution; USE 1  UNIT DOSE IN NEBULIZER EVERY 4 HOURS AS NEEDED   · Start: Nebulizer Device; use as directed   · Start: Nebulizer/Adult Mask KIT; use as directed   · Start: Pocket Spacer Device; use as directed   · Drink plenty of fluids ; Status:Complete;   Done: 45MEY0599   · Shared Decision Making Aid given; Status:Complete;   Done: 80HEF2220   · * XR CHEST PA & LATERAL; Status:Complete;   Done: 35UAP2265 03:46PM    Signatures   Electronically signed by : KYLEE Alvarez; Jan 2 2018 11:14AM EST                       (Author)

## 2018-01-24 ENCOUNTER — OFFICE VISIT (OUTPATIENT)
Dept: FAMILY MEDICINE CLINIC | Facility: CLINIC | Age: 63
End: 2018-01-24
Payer: MEDICARE

## 2018-01-24 VITALS
WEIGHT: 158 LBS | BODY MASS INDEX: 29.08 KG/M2 | TEMPERATURE: 98.7 F | HEIGHT: 62 IN | DIASTOLIC BLOOD PRESSURE: 60 MMHG | OXYGEN SATURATION: 99 % | RESPIRATION RATE: 18 BRPM | HEART RATE: 101 BPM | SYSTOLIC BLOOD PRESSURE: 122 MMHG

## 2018-01-24 VITALS
SYSTOLIC BLOOD PRESSURE: 130 MMHG | BODY MASS INDEX: 27.6 KG/M2 | WEIGHT: 150 LBS | HEART RATE: 82 BPM | DIASTOLIC BLOOD PRESSURE: 76 MMHG | HEIGHT: 62 IN

## 2018-01-24 VITALS
DIASTOLIC BLOOD PRESSURE: 72 MMHG | HEART RATE: 92 BPM | HEIGHT: 62 IN | SYSTOLIC BLOOD PRESSURE: 130 MMHG | RESPIRATION RATE: 18 BRPM | OXYGEN SATURATION: 96 %

## 2018-01-24 DIAGNOSIS — J98.11 ATELECTASIS: Primary | ICD-10-CM

## 2018-01-24 PROBLEM — F41.8 MIXED ANXIETY DEPRESSIVE DISORDER: Status: ACTIVE | Noted: 2018-01-24

## 2018-01-24 PROBLEM — H81.319 AUDITORY VERTIGO: Status: ACTIVE | Noted: 2018-01-24

## 2018-01-24 PROBLEM — M54.50 LOW BACK PAIN: Status: ACTIVE | Noted: 2018-01-24

## 2018-01-24 PROBLEM — H81.10 BENIGN PAROXYSMAL POSITIONAL VERTIGO: Status: ACTIVE | Noted: 2018-01-24

## 2018-01-24 PROBLEM — M65.4 DE QUERVAIN'S TENOSYNOVITIS, RIGHT: Status: ACTIVE | Noted: 2018-01-24

## 2018-01-24 PROBLEM — M77.8 HAND TENDONITIS: Status: ACTIVE | Noted: 2018-01-24

## 2018-01-24 PROBLEM — R91.1 LUNG NODULE: Status: ACTIVE | Noted: 2018-01-02

## 2018-01-24 PROBLEM — J43.9 BULLOUS EMPHYSEMA (HCC): Status: ACTIVE | Noted: 2018-01-24

## 2018-01-24 PROCEDURE — 99213 OFFICE O/P EST LOW 20 MIN: CPT | Performed by: NURSE PRACTITIONER

## 2018-01-24 RX ORDER — ROSUVASTATIN CALCIUM 5 MG/1
TABLET, COATED ORAL
COMMUNITY
End: 2018-01-24

## 2018-01-24 RX ORDER — CARBAMAZEPINE 100 MG/1
TABLET, EXTENDED RELEASE ORAL
COMMUNITY
Start: 2017-07-05 | End: 2018-02-22 | Stop reason: ALTCHOICE

## 2018-01-24 NOTE — PROGRESS NOTES
Assessment/Plan:    No problem-specific Assessment & Plan notes found for this encounter  1  Use inhaler and nebulizer a couple of times a day  2  F/u next week for re-ck     Diagnoses and all orders for this visit:    Atelectasis    Other orders  -     carBAMazepine (TEGretol XR) 100 mg 12 hr tablet; Take by mouth  -     Discontinue: rosuvastatin (CRESTOR) 5 mg tablet; Take by mouth          Subjective:      Patient ID: Sally Hart is a 63 y o  female  presetns for f/u chest xray and chest pain  Has atelectatics  Is not using inhaler  Was seen by pulmonology 2 weeks ago  Has hx of lung nodule  Is following up PET scan tomorrow  Denies fever  Pain better than last week  Deneis SOB/palpatations      HPI    The following portions of the patient's history were reviewed and updated as appropriate: past family history, past social history, past surgical history and problem list     Review of Systems   Constitutional: Negative  Respiratory: Negative  Cardiovascular: Positive for chest pain  Objective:     Physical Exam   Constitutional: She appears well-developed and well-nourished  Cardiovascular: Normal rate, regular rhythm and normal heart sounds      Pulmonary/Chest: Effort normal and breath sounds normal

## 2018-01-25 ENCOUNTER — HOSPITAL ENCOUNTER (OUTPATIENT)
Dept: RADIOLOGY | Age: 63
Discharge: HOME | End: 2018-01-25
Payer: MEDICARE

## 2018-01-25 DIAGNOSIS — R91.1 LUNG NODULE: ICD-10-CM

## 2018-01-25 LAB — GLUCOSE SERPL-MCNC: 85 MG/DL (ref 65–140)

## 2018-01-25 PROCEDURE — 78815 PET IMAGE W/CT SKULL-THIGH: CPT

## 2018-01-25 PROCEDURE — 82948 REAGENT STRIP/BLOOD GLUCOSE: CPT

## 2018-01-25 PROCEDURE — A9552 F18 FDG: HCPCS

## 2018-01-25 RX ADMIN — IOHEXOL 5 ML: 240 INJECTION, SOLUTION INTRATHECAL; INTRAVASCULAR; INTRAVENOUS; ORAL at 13:15

## 2018-01-26 ENCOUNTER — TELEPHONE (OUTPATIENT)
Dept: PULMONOLOGY | Facility: MEDICAL CENTER | Age: 63
End: 2018-01-26

## 2018-01-26 NOTE — PROGRESS NOTES
I called the patient to notify her of the results of her petct  We briefly discussed her options however will need to discuss in more detail as will need to take in to account functional capacity and lung function   Will discuss with AP

## 2018-01-29 DIAGNOSIS — R91.1 LUNG NODULE: Primary | ICD-10-CM

## 2018-01-29 DIAGNOSIS — J43.2 CENTRILOBULAR EMPHYSEMA (HCC): ICD-10-CM

## 2018-01-29 NOTE — PROGRESS NOTES
Reviewed CT/PET scan results with patient  She defers biopsy  Nodule was originally 8mm in 2008, it is now 1 4 cm and SUV 2 4  She is aware that this lesion could represent lung cancer, but still defers

## 2018-01-30 ENCOUNTER — TELEPHONE (OUTPATIENT)
Dept: PULMONOLOGY | Facility: MEDICAL CENTER | Age: 63
End: 2018-01-30

## 2018-02-22 ENCOUNTER — OFFICE VISIT (OUTPATIENT)
Dept: FAMILY MEDICINE CLINIC | Facility: CLINIC | Age: 63
End: 2018-02-22
Payer: COMMERCIAL

## 2018-02-22 VITALS
BODY MASS INDEX: 29.08 KG/M2 | WEIGHT: 159 LBS | OXYGEN SATURATION: 97 % | RESPIRATION RATE: 12 BRPM | DIASTOLIC BLOOD PRESSURE: 82 MMHG | TEMPERATURE: 97.5 F | SYSTOLIC BLOOD PRESSURE: 120 MMHG | HEART RATE: 77 BPM

## 2018-02-22 DIAGNOSIS — H90.3 SENSORINEURAL HEARING LOSS (SNHL) OF BOTH EARS: ICD-10-CM

## 2018-02-22 DIAGNOSIS — Z71.89 ENCOUNTER FOR HEARING AID CONSULTATION: Primary | ICD-10-CM

## 2018-02-22 DIAGNOSIS — F17.200 SMOKER: ICD-10-CM

## 2018-02-22 PROCEDURE — 99213 OFFICE O/P EST LOW 20 MIN: CPT | Performed by: INTERNAL MEDICINE

## 2018-02-22 NOTE — PROGRESS NOTES
Assessment/Plan:      Diagnoses and all orders for this visit:    Encounter for hearing aid consultation    Smoker      Form completed and copy scanned into chart for hearing aids  Declined smoking cessation  Subjective:     Patient ID: Shagufta Molina is a 63 y o  female  62 yo F smoker with R lung mass presents for clearance for hearing aids  Patient states she has had hearing difficulties x 10 years and she recently found out that insurance covers hearing aids and would like to order them for both ears  Denies ear pain, drainage, injury, surgery  Patient also states she is currently seeking a second opinion for her right lung mass as pulmonology has recommended biopsy  She is up to date with colonoscopy, mammogram, and pap smear  Review of Systems   Constitutional: Negative for fatigue  HENT: Positive for sinus pressure  Negative for dental problem, ear discharge, ear pain, rhinorrhea and tinnitus  Eyes: Negative for visual disturbance  Cardiovascular: Negative for chest pain and leg swelling  Gastrointestinal: Negative for abdominal pain  Genitourinary: Negative for dysuria  Musculoskeletal: Negative for gait problem  Neurological: Negative for light-headedness  Hematological: Does not bruise/bleed easily  Objective:     Physical Exam   Constitutional: She is oriented to person, place, and time  She appears well-developed and well-nourished  HENT:   Head: Normocephalic  Right Ear: External ear normal    Left Ear: External ear normal    Nose: Nose normal    Mouth/Throat: Oropharynx is clear and moist  No oropharyngeal exudate  Eyes: Pupils are equal, round, and reactive to light  No scleral icterus  Neck: Normal range of motion  Neck supple  No JVD present  No thyromegaly present  Cardiovascular: Normal rate, regular rhythm, normal heart sounds and intact distal pulses  No murmur heard    Pulmonary/Chest: Effort normal and breath sounds normal  No respiratory distress  She has no wheezes  She exhibits no tenderness  Abdominal: Soft  Bowel sounds are normal  She exhibits no distension  There is no tenderness  Musculoskeletal: Normal range of motion  She exhibits no edema  Neurological: She is alert and oriented to person, place, and time  She has normal reflexes  Skin: Skin is warm and dry  Psychiatric: She has a normal mood and affect  Nursing note and vitals reviewed

## 2018-03-26 ENCOUNTER — OFFICE VISIT (OUTPATIENT)
Dept: FAMILY MEDICINE CLINIC | Facility: CLINIC | Age: 63
End: 2018-03-26
Payer: COMMERCIAL

## 2018-03-26 VITALS
DIASTOLIC BLOOD PRESSURE: 72 MMHG | WEIGHT: 160 LBS | SYSTOLIC BLOOD PRESSURE: 140 MMHG | TEMPERATURE: 98.8 F | RESPIRATION RATE: 22 BRPM | OXYGEN SATURATION: 96 % | HEART RATE: 102 BPM | BODY MASS INDEX: 29.26 KG/M2

## 2018-03-26 DIAGNOSIS — J01.40 ACUTE NON-RECURRENT PANSINUSITIS: Primary | ICD-10-CM

## 2018-03-26 PROCEDURE — 99213 OFFICE O/P EST LOW 20 MIN: CPT | Performed by: NURSE PRACTITIONER

## 2018-03-26 RX ORDER — SULFAMETHOXAZOLE AND TRIMETHOPRIM 800; 160 MG/1; MG/1
1 TABLET ORAL EVERY 12 HOURS SCHEDULED
Qty: 14 TABLET | Refills: 0 | Status: SHIPPED | OUTPATIENT
Start: 2018-03-26 | End: 2018-04-02

## 2018-03-26 NOTE — PROGRESS NOTES
Assessment/Plan:  1  Use Flonase daily and may take about 3 days to start to work  2  Follow up if condition changes or worsens       Diagnoses and all orders for this visit:    Acute non-recurrent pansinusitis  -     sulfamethoxazole-trimethoprim (BACTRIM DS) 800-160 mg per tablet; Take 1 tablet by mouth every 12 (twelve) hours for 7 days          Subjective:      Patient ID: Saran Miner is a 63 y o  female  A 12year-old female presents with sinus congestion and pressure for about a week  Is coughing/wheezing  Some body aches  Patient is smoker  Tried some OTC  No help  Denies fever  The following portions of the patient's history were reviewed and updated as appropriate: allergies and current medications  Review of Systems   Constitutional: Negative  HENT: Positive for congestion, sinus pain, sinus pressure and sore throat  Respiratory: Positive for cough and wheezing  Cardiovascular: Negative  Objective:      /72   Pulse 102   Temp 98 8 °F (37 1 °C)   Resp 22   Wt 72 6 kg (160 lb)   SpO2 96%   BMI 29 26 kg/m²          Physical Exam   Constitutional: She appears well-nourished  HENT:   Head: Normocephalic and atraumatic  Right Ear: External ear normal    Left Ear: External ear normal    Nose: Rhinorrhea present  Mouth/Throat: Oropharynx is clear and moist    Pulmonary/Chest: Effort normal  She has no wheezes     Cough/wet

## 2018-04-03 ENCOUNTER — HOSPITAL ENCOUNTER (EMERGENCY)
Facility: HOSPITAL | Age: 63
Discharge: HOME | End: 2018-04-03
Attending: EMERGENCY MEDICINE | Admitting: EMERGENCY MEDICINE
Payer: COMMERCIAL

## 2018-04-03 ENCOUNTER — APPOINTMENT (EMERGENCY)
Dept: RADIOLOGY | Facility: HOSPITAL | Age: 63
End: 2018-04-03
Payer: COMMERCIAL

## 2018-04-03 VITALS
TEMPERATURE: 98.6 F | RESPIRATION RATE: 18 BRPM | OXYGEN SATURATION: 97 % | DIASTOLIC BLOOD PRESSURE: 77 MMHG | BODY MASS INDEX: 29.26 KG/M2 | WEIGHT: 160 LBS | SYSTOLIC BLOOD PRESSURE: 161 MMHG | HEART RATE: 90 BPM

## 2018-04-03 DIAGNOSIS — S90.112A CONTUSION OF LEFT GREAT TOE WITHOUT DAMAGE TO NAIL, INITIAL ENCOUNTER: Primary | ICD-10-CM

## 2018-04-03 PROCEDURE — 99283 EMERGENCY DEPT VISIT LOW MDM: CPT

## 2018-04-03 PROCEDURE — 73630 X-RAY EXAM OF FOOT: CPT

## 2018-04-03 NOTE — ED PROVIDER NOTES
History  Chief Complaint   Patient presents with    Foot Pain     leaf from the table fell on left great toe and foot  c/o pain in same     53-year-old female presents with complaints of pain to her left great toe when the leaf of a table fell directly on her toe  States this happened approximately 2 hours ago has pain swelling ecchymosis to the toe  No obvious deformity         History provided by:  Patient   used: No        Prior to Admission Medications   Prescriptions Last Dose Informant Patient Reported? Taking?   sulfamethoxazole-trimethoprim (BACTRIM DS) 800-160 mg per tablet   No No   Sig: Take 1 tablet by mouth every 12 (twelve) hours for 7 days      Facility-Administered Medications: None       Past Medical History:   Diagnosis Date    Acid reflux     Anxiety     Arthritis     Cancer ([address] )     skin on chin    Chronic kidney disease     (R) kidney smaller than (L),  kidney stones    Chronic UTI (urinary tract infection)     COPD (chronic obstructive pulmonary disease) (HCC)     mild, recent dx    Depression     Epidermal cyst of face     last assessed 10-   [facility] (hard of hearing)     deaf (L) ear, decreased hearing (R) ear    Hyperlipemia     diet controlled    Hyperlipidemia     IBS (irritable bowel syndrome)     Malignant neoplasm of skin     Meniere disease     Uterine leiomyoma        Past Surgical History:   Procedure Laterality Date    COLONOSCOPY N/A 11/4/2016    Procedure: COLONOSCOPY;  Surgeon: Zaida Aragon MD;  Location: [place] [address] GI LAB; Service:     ERCP      ESOPHAGOGASTRODUODENOSCOPY N/A 11/4/2016    Procedure: ESOPHAGOGASTRODUODENOSCOPY (EGD); Surgeon: Zaida Aragon MD;  Location: [facility] GI LAB; Service:     EYE SURGERY       [facility] [address] CATARACT EXTRACAP,INSERT LENS Left 3/7/2016    Procedure: EXTRACTION EXTRACAPSULAR CATARACT PHACO INTRAOCULAR LENS (IOL);   Surgeon: Danielle Dowling MD;  Location: [facility] MAIN OR;  Service: Ophthalmology   Kathy Johnson SKIN CANCER EXCISION      excision skin cancer on chin       Family History   Problem Relation Age of Onset    Heart disease Mother      cardiac disorder    Colonic polyp Sister     Osteoporosis Family     Colon cancer Family     Heart disease Family      cardiac disorder    Heart disease Family      cardiac disorder    Colon cancer Family     Lung cancer Family     Cancer Family      anterior wall of urinanry bladder    Breast cancer Family     Other Family      brain tumor    Breast cancer Cousin      I have reviewed and agree with the history as documented  Social History   Substance Use Topics    Smoking status: Current Every Day Smoker     Packs/day: 1 00     Years: 43 00     Types: Cigarettes    Smokeless tobacco: Never Used      Comment: 45+ years per allscripts    Alcohol use No        Review of Systems   Constitutional: Negative for activity change, chills, diaphoresis and fever  HENT: Negative for congestion, ear pain, nosebleeds, sore throat, trouble swallowing and voice change  Eyes: Negative for pain, discharge and redness  Respiratory: Negative for apnea, cough, choking, shortness of breath, wheezing and stridor  Cardiovascular: Negative for chest pain and palpitations  Gastrointestinal: Negative for abdominal distention, abdominal pain, constipation, diarrhea, nausea and vomiting  Endocrine: Negative for polydipsia  Genitourinary: Negative for difficulty urinating, dysuria, flank pain, frequency, hematuria and urgency  Musculoskeletal: Positive for arthralgias and gait problem  Negative for back pain, joint swelling, myalgias, neck pain and neck stiffness  Skin: Negative for pallor and rash  Neurological: Negative for dizziness, tremors, syncope, speech difficulty, weakness, numbness and headaches  Hematological: Negative for adenopathy  Psychiatric/Behavioral: Negative for confusion, hallucinations, self-injury and suicidal ideas   The patient is not nervous/anxious  Physical Exam  ED Triage Vitals [04/03/18 1828]   Temperature Pulse Respirations Blood Pressure SpO2   98 6 °F (37 °C) 90 18 161/77 97 %      Temp Source Heart Rate Source Patient Position - Orthostatic VS BP Location FiO2 (%)   Tympanic Monitor Sitting Right arm --      Pain Score       9           Orthostatic Vital Signs  Vitals:    04/03/18 1828   BP: 161/77   Pulse: 90   Patient Position - Orthostatic VS: Sitting       Physical Exam   Constitutional: She is oriented to person, place, and time  Vital signs are normal  She appears well-developed and well-nourished  HENT:   Head: Normocephalic and atraumatic  Eyes: EOM are normal  Pupils are equal, round, and reactive to light  Neck: Normal range of motion  Neck supple  Cardiovascular: Normal rate  Pulmonary/Chest: Effort normal and breath sounds normal    Abdominal: Soft  Bowel sounds are normal    Musculoskeletal: Normal range of motion  She exhibits edema and tenderness  Edema ecchymosis and tenderness to the left great toe as described earlier  Neurological: She is alert and oriented to person, place, and time  Skin: Skin is warm  Psychiatric: She has a normal mood and affect  Nursing note and vitals reviewed  ED Medications  Medications - No data to display    Diagnostic Studies  Results Reviewed     None                 XR foot 3+ views LEFT   Final Result by Lenny Valle MD (04/03 1854)      No acute left foot fracture              Workstation performed: [id number]                    Procedures  Procedures       Phone Contacts  ED Phone Contact    ED Course  ED Course                                [facility]  CritCare Time    Disposition  Final diagnoses:   Contusion of left great toe without damage to nail, initial encounter     Time reflects when diagnosis was documented in both MDM as applicable and the Disposition within this note     Time User Action Codes Description Comment    4/3/2018  7:09 PM Padmini Valdes Add [S90 112A] Contusion of left great toe without damage to nail, initial encounter       ED Disposition     ED Disposition Condition Comment    Discharge  Chuckie Blizzard Haydu discharge to home/self care  Condition at discharge: Stable        Follow-up Information     Follow up With Specialties Details Why Contact Info    Parth Flores DO Family Medicine Schedule an appointment as soon as possible for a visit  [address] [place] [phone]          Patient's Medications    No medications on file     No discharge procedures on file      ED Provider  Electronically Signed by           Heidi Silva DO  04/03/18 3065

## 2018-04-03 NOTE — DISCHARGE INSTRUCTIONS
Contusion in Adults, Ambulatory Care   GENERAL INFORMATION:   A contusion  is a bruise that appears on your skin after an injury  A bruise happens when small blood vessels tear but skin does not  When blood vessels tear, blood leaks into nearby tissue, such as soft tissue or muscle  Common symptoms include the following:   · Pain that increases when you touch the bruise, walk, or use the area around the bruise    · Swelling or a lump at the site of the bruise or near it    · Red, blue, or black skin that may change to green or yellow after a few days    · Stiffness or problems moving the bruised area of your body  Seek immediate care for the following symptoms:   · New difficulty moving your injured area    · Tingling or numbness in or near the injured area    · Hand or foot below the bruise gets cold or turns pale  Treatment for a contusion  may include any of the following:  · NSAIDs  help decrease swelling and pain or fever  This medicine is available with or without a doctor's order  NSAIDs can cause stomach bleeding or kidney problems in certain people  If you take blood thinner medicine, always ask your healthcare provider if NSAIDs are safe for you  Always read the medicine label and follow directions  · Pain medicine  to decrease or take away pain  Do not wait until the pain is severe before you take your medicine  · Aspiration  to drain pooled blood in your muscle may be done to help prevent increased pressure in the muscle  · Surgery  may be done to repair a tear in the muscle or relieve pressure in the muscle caused by swelling  Care for a contusion:   · Rest the injured area  or use it less than usual  If you bruised your leg or foot, you may need crutches or a cane to help you walk  This will help you keep weight off your injured body part  Use crutches or a cane as directed  · Use ice  to decrease swelling and pain  Ice may also help prevent tissue damage   Use an ice pack, or put crushed ice in a plastic bag  Cover it with a towel and place it on your bruise for 15 to 20 minutes every hour or as directed  · Use Compression  An elastic bandage may be wrapped around a bruised muscle to support the area and decrease swelling  Make sure the bandage is not too tight  You should be able to fit 1 finger between the bandage and your skin  · Elevate (raise) your injured body part  above the level of your heart to help decrease pain and swelling  Use pillows, blankets, or rolled towels to elevate the area as often as you can  · Do not massage or use heat  Heat and massage may slow healing of the area  · Do not drink alcohol  Alcohol may slow healing of your injury  · Do not stretch injured muscles  Ask your healthcare provider when and how you may safely stretch after your injury  Prevent a contusion:   · Stretch and warm up before you play sports or exercise  · Wear protective gear when you play sports  Examples are shin guards and padding  · If you begin a new physical activity, start slowly to give your body a chance to adjust   Follow up with your healthcare provider as directed:  Write down your questions so you remember to ask them during your visits  CARE AGREEMENT:   You have the right to help plan your care  Learn about your health condition and how it may be treated  Discuss treatment options with your caregivers to decide what care you want to receive  You always have the right to refuse treatment  The above information is an  only  It is not intended as medical advice for individual conditions or treatments  Talk to your doctor, nurse or pharmacist before following any medical regimen to see if it is safe and effective for you  © 2014 [address] is for End User's use only and may not be sold, redistributed or otherwise used for commercial purposes   All illustrations and images included in CareNotes® are the copyrighted property of A D A M [employer]  or Leo Garland

## 2018-04-25 ENCOUNTER — HOSPITAL ENCOUNTER (EMERGENCY)
Facility: HOSPITAL | Age: 63
Discharge: HOME | End: 2018-04-25
Attending: EMERGENCY MEDICINE | Admitting: EMERGENCY MEDICINE
Payer: COMMERCIAL

## 2018-04-25 ENCOUNTER — APPOINTMENT (EMERGENCY)
Dept: RADIOLOGY | Facility: HOSPITAL | Age: 63
End: 2018-04-25
Payer: COMMERCIAL

## 2018-04-25 VITALS
DIASTOLIC BLOOD PRESSURE: 80 MMHG | WEIGHT: 160 LBS | RESPIRATION RATE: 20 BRPM | HEIGHT: 62 IN | SYSTOLIC BLOOD PRESSURE: 142 MMHG | OXYGEN SATURATION: 100 % | HEART RATE: 71 BPM | TEMPERATURE: 99.2 F | BODY MASS INDEX: 29.44 KG/M2

## 2018-04-25 DIAGNOSIS — R10.10 PAIN OF UPPER ABDOMEN: ICD-10-CM

## 2018-04-25 DIAGNOSIS — K21.9 GERD (GASTROESOPHAGEAL REFLUX DISEASE): Primary | ICD-10-CM

## 2018-04-25 LAB
ALBUMIN SERPL BCP-MCNC: 3.9 G/DL (ref 3.5–5)
ALP SERPL-CCNC: 112 U/L (ref 46–116)
ALT SERPL W P-5'-P-CCNC: 27 U/L (ref 12–78)
ANION GAP SERPL CALCULATED.3IONS-SCNC: 8 MMOL/L (ref 4–13)
APTT PPP: 27 SECONDS (ref 24–33)
AST SERPL W P-5'-P-CCNC: 19 U/L (ref 5–45)
BACTERIA UR QL AUTO: ABNORMAL /HPF
BASOPHILS # BLD AUTO: 0 THOUSANDS/ΜL (ref 0–0.1)
BASOPHILS NFR BLD AUTO: 0 % (ref 0–1)
BILIRUB SERPL-MCNC: 0.3 MG/DL (ref 0.2–1)
BILIRUB UR QL STRIP: NEGATIVE
BUN SERPL-MCNC: 17 MG/DL (ref 5–25)
CALCIUM SERPL-MCNC: 9.8 MG/DL (ref 8.3–10.1)
CHLORIDE SERPL-SCNC: 103 MMOL/L (ref 100–108)
CLARITY UR: CLEAR
CO2 SERPL-SCNC: 29 MMOL/L (ref 21–32)
COLOR UR: ABNORMAL
CREAT SERPL-MCNC: 0.89 MG/DL (ref 0.6–1.3)
EOSINOPHIL # BLD AUTO: 0.2 THOUSAND/ΜL (ref 0–0.61)
EOSINOPHIL NFR BLD AUTO: 2 % (ref 0–6)
ERYTHROCYTE [DISTWIDTH] IN BLOOD BY AUTOMATED COUNT: 13.8 % (ref 11.6–15.1)
GFR SERPL CREATININE-BSD FRML MDRD: 71 ML/MIN/1.73SQ M
GLUCOSE SERPL-MCNC: 101 MG/DL (ref 65–140)
GLUCOSE UR STRIP-MCNC: NEGATIVE MG/DL
HCT VFR BLD AUTO: 42.1 % (ref 37–47)
HGB BLD-MCNC: 13.9 G/DL (ref 12–16)
HGB UR QL STRIP.AUTO: ABNORMAL
INR PPP: 0.98 (ref 0.86–1.16)
KETONES UR STRIP-MCNC: NEGATIVE MG/DL
LEUKOCYTE ESTERASE UR QL STRIP: NEGATIVE
LIPASE SERPL-CCNC: 365 U/L (ref 73–393)
LYMPHOCYTES # BLD AUTO: 2.1 THOUSANDS/ΜL (ref 0.6–4.47)
LYMPHOCYTES NFR BLD AUTO: 22 % (ref 14–44)
MCH RBC QN AUTO: 28.7 PG (ref 27–31)
MCHC RBC AUTO-ENTMCNC: 33.1 G/DL (ref 31.4–37.4)
MCV RBC AUTO: 87 FL (ref 82–98)
MONOCYTES # BLD AUTO: 0.6 THOUSAND/ΜL (ref 0.17–1.22)
MONOCYTES NFR BLD AUTO: 6 % (ref 4–12)
NEUTROPHILS # BLD AUTO: 6.6 THOUSANDS/ΜL (ref 1.85–7.62)
NEUTS SEG NFR BLD AUTO: 70 % (ref 43–75)
NITRITE UR QL STRIP: NEGATIVE
NON-SQ EPI CELLS URNS QL MICRO: ABNORMAL /HPF
NRBC BLD AUTO-RTO: 0 /100 WBCS
PH UR STRIP.AUTO: 6.5 [PH] (ref 5–9)
PLATELET # BLD AUTO: 238 THOUSANDS/UL (ref 130–400)
PMV BLD AUTO: 7.6 FL (ref 8.9–12.7)
POTASSIUM SERPL-SCNC: 4.2 MMOL/L (ref 3.5–5.3)
PROT SERPL-MCNC: 7.4 G/DL (ref 6.4–8.2)
PROT UR STRIP-MCNC: NEGATIVE MG/DL
PROTHROMBIN TIME: 10.3 SECONDS (ref 9.4–11.7)
RBC # BLD AUTO: 4.85 MILLION/UL (ref 4.2–5.4)
RBC #/AREA URNS AUTO: ABNORMAL /HPF
SODIUM SERPL-SCNC: 140 MMOL/L (ref 136–145)
SP GR UR STRIP.AUTO: <=1.005 (ref 1–1.03)
TROPONIN I SERPL-MCNC: <0.02 NG/ML
UROBILINOGEN UR QL STRIP.AUTO: 0.2 E.U./DL
WBC # BLD AUTO: 9.5 THOUSAND/UL (ref 4.8–10.8)
WBC #/AREA URNS AUTO: ABNORMAL /HPF

## 2018-04-25 PROCEDURE — 85025 COMPLETE CBC W/AUTO DIFF WBC: CPT | Performed by: EMERGENCY MEDICINE

## 2018-04-25 PROCEDURE — 76705 ECHO EXAM OF ABDOMEN: CPT

## 2018-04-25 PROCEDURE — 80053 COMPREHEN METABOLIC PANEL: CPT | Performed by: EMERGENCY MEDICINE

## 2018-04-25 PROCEDURE — 96361 HYDRATE IV INFUSION ADD-ON: CPT

## 2018-04-25 PROCEDURE — 84484 ASSAY OF TROPONIN QUANT: CPT | Performed by: EMERGENCY MEDICINE

## 2018-04-25 PROCEDURE — 83690 ASSAY OF LIPASE: CPT | Performed by: EMERGENCY MEDICINE

## 2018-04-25 PROCEDURE — 93005 ELECTROCARDIOGRAM TRACING: CPT

## 2018-04-25 PROCEDURE — 85610 PROTHROMBIN TIME: CPT | Performed by: EMERGENCY MEDICINE

## 2018-04-25 PROCEDURE — 81001 URINALYSIS AUTO W/SCOPE: CPT | Performed by: EMERGENCY MEDICINE

## 2018-04-25 PROCEDURE — 85730 THROMBOPLASTIN TIME PARTIAL: CPT | Performed by: EMERGENCY MEDICINE

## 2018-04-25 PROCEDURE — 96374 THER/PROPH/DIAG INJ IV PUSH: CPT

## 2018-04-25 PROCEDURE — C9113 INJ PANTOPRAZOLE SODIUM, VIA: HCPCS | Performed by: EMERGENCY MEDICINE

## 2018-04-25 PROCEDURE — 71045 X-RAY EXAM CHEST 1 VIEW: CPT

## 2018-04-25 PROCEDURE — 99285 EMERGENCY DEPT VISIT HI MDM: CPT

## 2018-04-25 PROCEDURE — 36415 COLL VENOUS BLD VENIPUNCTURE: CPT | Performed by: EMERGENCY MEDICINE

## 2018-04-25 RX ORDER — PANTOPRAZOLE SODIUM 40 MG/1
40 INJECTION, POWDER, FOR SOLUTION INTRAVENOUS ONCE
Status: COMPLETED | OUTPATIENT
Start: 2018-04-25 | End: 2018-04-25

## 2018-04-25 RX ORDER — PANTOPRAZOLE SODIUM 20 MG/1
20 TABLET, DELAYED RELEASE ORAL DAILY
Qty: 30 TABLET | Refills: 0 | Status: SHIPPED | OUTPATIENT
Start: 2018-04-25 | End: 2018-05-16 | Stop reason: DRUGHIGH

## 2018-04-25 RX ADMIN — PANTOPRAZOLE SODIUM 40 MG: 40 INJECTION, POWDER, FOR SOLUTION INTRAVENOUS at 13:37

## 2018-04-25 RX ADMIN — SODIUM CHLORIDE 1000 ML: 0.9 INJECTION, SOLUTION INTRAVENOUS at 13:35

## 2018-04-25 NOTE — ED NOTES
Ultrasound here to do study, pt in no distress, using phone, visitor at bedside     Terri Dueñas, GALEN  04/25/18 6136

## 2018-04-25 NOTE — DISCHARGE INSTRUCTIONS
Abdominal Pain   WHAT YOU NEED TO KNOW:   Abdominal pain can be dull, achy, or sharp  You may have pain in one area of your abdomen, or in your entire abdomen  Your pain may be caused by a condition such as constipation, food sensitivity or poisoning, infection, or a blockage  Abdominal pain can also be from a hernia, appendicitis, or an ulcer  Liver, gallbladder, or kidney conditions can also cause abdominal pain  The cause of your abdominal pain may be unknown  DISCHARGE INSTRUCTIONS:   Return to the emergency department if:   · You have new chest pain or shortness of breath  · You have pulsing pain in your upper abdomen or lower back that suddenly becomes constant  · Your pain is in the right lower abdominal area and worsens with movement  · You have a fever over 100 4°F (38°C) or shaking chills  · You are vomiting and cannot keep food or liquids down  · Your pain does not improve or gets worse over the next 8 to 12 hours  · You see blood in your vomit or bowel movements, or they look black and tarry  · Your skin or the whites of your eyes turn yellow  · You are a woman and have a large amount of vaginal bleeding that is not your monthly period  Contact your healthcare provider if:   · You have pain in your lower back  · You are a man and have pain in your testicles  · You have pain when you urinate  · You have questions or concerns about your condition or care  Follow up with your healthcare provider within 24 hours or as directed:  Write down your questions so you remember to ask them during your visits  Medicines:   · Medicines  may be given to calm your stomach and prevent vomiting or to decrease pain  Ask how to take pain medicine safely  · Take your medicine as directed  Contact your healthcare provider if you think your medicine is not helping or if you have side effects  Tell him of her if you are allergic to any medicine   Keep a list of the medicines, vitamins, and herbs you take  Include the amounts, and when and why you take them  Bring the list or the pill bottles to follow-up visits  Carry your medicine list with you in case of an emergency  © 2017 [facility] Information is for End User's use only and may not be sold, redistributed or otherwise used for commercial purposes  All illustrations and images included in CareNotes® are the copyrighted property of [employer]  or Leo Garland  The above information is an  only  It is not intended as medical advice for individual conditions or treatments  Talk to your doctor, nurse or pharmacist before following any medical regimen to see if it is safe and effective for you  Gastroesophageal Reflux Disease   WHAT YOU NEED TO KNOW:   Gastroesophageal reflux occurs when acid and food in the stomach back up into the esophagus  Gastroesophageal reflux disease (GERD) is reflux that occurs more than twice a week for a few weeks  It usually causes heartburn and other symptoms  GERD can cause other health problems over time if it is not treated  DISCHARGE INSTRUCTIONS:   Return to the emergency department if:   · You feel full and cannot burp or vomit  · You have severe chest pain and sudden trouble breathing  · Your bowel movements are black, bloody, or tarry-looking  · Your vomit looks like coffee grounds or has blood in it  Contact your healthcare provider if:   · You vomit large amounts, or you vomit often  · You have trouble breathing after you vomit  · You have trouble swallowing, or pain with swallowing  · You are losing weight without trying  · Your symptoms get worse or do not improve with treatment  · You have questions or concerns about your condition or care  Medicines:   · Medicines  are used to decrease stomach acid  Medicine may also be used to help your lower esophageal sphincter and stomach contract (tighten) more      · Take your medicine as directed  Contact your healthcare provider if you think your medicine is not helping or if you have side effects  Tell him of her if you are allergic to any medicine  Keep a list of the medicines, vitamins, and herbs you take  Include the amounts, and when and why you take them  Bring the list or the pill bottles to follow-up visits  Carry your medicine list with you in case of an emergency  Manage GERD:   · Do not have foods or drinks that may increase heartburn  These include chocolate, peppermint, fried or fatty foods, drinks that contain caffeine, or carbonated drinks (soda)  Other foods include spicy foods, onions, tomatoes, and tomato-based foods  Do not have foods or drinks that can irritate your esophagus, such as citrus fruits, juices, and alcohol  · Do not eat large meals  When you eat a lot of food at one time, your stomach needs more acid to digest it  Eat 6 small meals each day instead of 3 large ones, and eat slowly  Do not eat meals 2 to 3 hours before bedtime  · Elevate the head of your bed  Place 6-inch blocks under the head of your bed frame  You may also use more than one pillow under your head and shoulders while you sleep  · Maintain a healthy weight  If you are overweight, weight loss may help relieve symptoms of GERD  · Do not smoke  Smoking weakens the lower esophageal sphincter and increases the risk of GERD  Ask your healthcare provider for information if you currently smoke and need help to quit  E-cigarettes or smokeless tobacco still contain nicotine  Talk to your healthcare provider before you use these products  · Do not wear clothing that is tight around your waist   Tight clothing can put pressure on your stomach and cause or worsen GERD symptoms  Follow up with your healthcare provider as directed:  Write down your questions so you remember to ask them during your visits    © 2017 [facility] Information is for End User's use only and may not be sold, redistributed or otherwise used for commercial purposes  All illustrations and images included in CareNotes® are the copyrighted property of A D A M [employer] Inc  or Leo Garland  The above information is an  only  It is not intended as medical advice for individual conditions or treatments  Talk to your doctor, nurse or pharmacist before following any medical regimen to see if it is safe and effective for you

## 2018-04-25 NOTE — ED PROVIDER NOTES
History  Chief Complaint   Patient presents with    Abdominal Pain     Patient complains of abd pain  States that she has acid reflux that she can't get rid of and abd pain that she can't get rid of"     Patient noticed upper abdominal pain going across the abdomen in a bandlike fashion radiating to the back starting on Sunday after a fatty meal   She noticed increased indigestion an acid in throat  She had no fever chills and no jaundice  The symptoms improved with time but then would return again after fatty meals  It was also made worse with ascitic meals and tomatoes  Patient has a history of frequent indigestion, has been using Tums a lot  There is also strong family history of gallbladder disease            None       Past Medical History:   Diagnosis Date    Acid reflux     Anxiety     Arthritis     Cancer ([address] )     skin on chin    Chronic kidney disease     (R) kidney smaller than (L),  kidney stones    Chronic UTI (urinary tract infection)     COPD (chronic obstructive pulmonary disease) (HCC)     mild, recent dx    Depression     Epidermal cyst of face     last assessed 10-   [facility] (hard of hearing)     deaf (L) ear, decreased hearing (R) ear    Hyperlipemia     diet controlled    Hyperlipidemia     IBS (irritable bowel syndrome)     Malignant neoplasm of skin     Meniere disease     Uterine leiomyoma        Past Surgical History:   Procedure Laterality Date    COLONOSCOPY N/A 11/4/2016    Procedure: COLONOSCOPY;  Surgeon: Jennifer Matta MD;  Location: [place] [id number] GI LAB; Service:     ERCP      ESOPHAGOGASTRODUODENOSCOPY N/A 11/4/2016    Procedure: ESOPHAGOGASTRODUODENOSCOPY (EGD); Surgeon: Jennifer Matta MD;  Location: [facility] GI LAB; Service:     EYE SURGERY       [facility] CATARACT EXTRACAP,INSERT LENS Left 3/7/2016    Procedure: EXTRACTION EXTRACAPSULAR CATARACT PHACO INTRAOCULAR LENS (IOL);   Surgeon: Beau Fisher MD;  Location: [facility] MAIN OR;  Service: Ophthalmology   Elidia Field SKIN CANCER EXCISION      excision skin cancer on chin       Family History   Problem Relation Age of Onset    Heart disease Mother      cardiac disorder    Colonic polyp Sister     Osteoporosis Family     Colon cancer Family     Heart disease Family      cardiac disorder    Heart disease Family      cardiac disorder    Colon cancer Family     Lung cancer Family     Cancer Family      anterior wall of urinanry bladder    Breast cancer Family     Other Family      brain tumor    Breast cancer Cousin      I have reviewed and agree with the history as documented  Social History   Substance Use Topics    Smoking status: Current Every Day Smoker     Packs/day: 1 00     Years: 43 00     Types: Cigarettes    Smokeless tobacco: Never Used      Comment: 45+ years per allscripts    Alcohol use No        Review of Systems   Constitutional: Negative for fever  HENT: Negative for congestion  Respiratory: Negative for cough and shortness of breath  Cardiovascular: Positive for chest pain  Gastrointestinal: Positive for abdominal distention, abdominal pain, diarrhea and nausea  Negative for vomiting  Genitourinary: Negative for dysuria  Musculoskeletal: Positive for arthralgias and back pain  Neurological: Negative for syncope  All other systems reviewed and are negative        Physical Exam  ED Triage Vitals   Temperature Pulse Respirations Blood Pressure SpO2   04/25/18 1219 04/25/18 1219 04/25/18 1219 04/25/18 1219 04/25/18 1219   99 3 °F (37 4 °C) 85 20 136/83 95 %      Temp Source Heart Rate Source Patient Position - Orthostatic VS BP Location FiO2 (%)   04/25/18 1345 04/25/18 1345 04/25/18 1345 04/25/18 1345 --   Tympanic Monitor Lying Right arm       Pain Score       04/25/18 1219       7           Orthostatic Vital Signs  Vitals:    04/25/18 1219 04/25/18 1345   BP: 136/83 142/80   Pulse: 85 71   Patient Position - Orthostatic VS:  Lying       Physical Exam   Constitutional: She is oriented to person, place, and time  She appears well-developed and well-nourished  HENT:   Head: Normocephalic and atraumatic  Mouth/Throat: Oropharynx is clear and moist    Eyes: Conjunctivae are normal    Neck: Normal range of motion  Neck supple  Cardiovascular: Normal rate, regular rhythm and normal heart sounds  Pulmonary/Chest: Effort normal and breath sounds normal  She exhibits no tenderness  Abdominal: Soft  Bowel sounds are normal  She exhibits distension  There is tenderness  There is no rebound  Musculoskeletal: Normal range of motion  She exhibits no edema  Neurological: She is alert and oriented to person, place, and time  Skin: Skin is warm and dry  Psychiatric: She has a normal mood and affect  Her behavior is normal    Nursing note and vitals reviewed        ED Medications  Medications   sodium chloride 0 9 % bolus 1,000 mL (0 mL Intravenous Stopped 4/25/18 1449)   pantoprazole (PROTONIX) injection 40 mg (40 mg Intravenous Given 4/25/18 1337)       Diagnostic Studies  Results Reviewed     Procedure Component Value Units Date/Time    Urine Microscopic [[id number]]  (Abnormal) Collected:  04/25/18 1450    Lab Status:  Final result Specimen:  Urine from Urine, Clean Catch Updated:  04/25/18 1510     RBC, UA 0-1 (A) /hpf      WBC, UA 4-10 (A) /hpf      Epithelial Cells Moderate (A) /hpf      Bacteria, UA Occasional /hpf     UA w Reflex to Microscopic [[id number]]  (Abnormal) Collected:  04/25/18 1450    Lab Status:  Final result Specimen:  Urine from Urine, Clean Catch Updated:  04/25/18 1459     Color, UA Light Yellow     Clarity, UA Clear     Specific Gravity, UA <=1 005     pH, UA 6 5     Leukocytes, UA Negative     Nitrite, UA Negative     Protein, UA Negative mg/dl      Glucose, UA Negative mg/dl      Ketones, UA Negative mg/dl      Urobilinogen, UA 0 2 E U /dl      Bilirubin, UA Negative     Blood, UA Trace-lysed (A)    Protime-INR [[id number]]  (Normal) Collected:  04/25/18 1330 Lab Status:  Final result Specimen:  Blood from Arm, Left Updated:  04/25/18 1404     Protime 10 3 seconds      INR 0 98    APTT [[id number]]  (Normal) Collected:  04/25/18 1330    Lab Status:  Final result Specimen:  Blood from Arm, Left Updated:  04/25/18 1404     PTT 27 seconds     Troponin I [[id number]]  (Normal) Collected:  04/25/18 1330    Lab Status:  Final result Specimen:  Blood from Arm, Left Updated:  04/25/18 1358     Troponin I <0 02 ng/mL     Narrative:         [employer] Chemistry analyzer 99% cutoff is > 0 04 ng/mL in network labs    o cTnI 99% cutoff is useful only when applied to patients in the clinical setting of myocardial ischemia  o cTnI 99% cutoff should be interpreted in the context of clinical history, ECG findings and possibly cardiac imaging to establish correct diagnosis  o cTnI 99% cutoff may be suggestive but clearly not indicative of a coronary event without the clinical setting of myocardial ischemia  Comprehensive metabolic panel [[id number]] Collected:  04/25/18 1330    Lab Status:  Final result Specimen:  Blood from Arm, Left Updated:  04/25/18 1354     Sodium 140 mmol/L      Potassium 4 2 mmol/L      Chloride 103 mmol/L      CO2 29 mmol/L      Anion Gap 8 mmol/L      BUN 17 mg/dL      Creatinine 0 89 mg/dL      Glucose 101 mg/dL      Calcium 9 8 mg/dL      AST 19 U/L      ALT 27 U/L      Alkaline Phosphatase 112 U/L      Total Protein 7 4 g/dL      Albumin 3 9 g/dL      Total Bilirubin 0 30 mg/dL      eGFR 71 ml/min/1 73sq m     Narrative:         National Kidney Disease Education Program recommendations are as follows:  GFR calculation is accurate only with a steady state creatinine  Chronic Kidney disease less than 60 ml/min/1 73 sq  meters  Kidney failure less than 15 ml/min/1 73 sq  meters      Lipase [[id number]]  (Normal) Collected:  04/25/18 1330    Lab Status:  Final result Specimen:  Blood from Arm, Left Updated:  04/25/18 1354     Lipase 365 u/L     CBC and differential [[id number]]  (Abnormal) Collected:  04/25/18 1330    Lab Status:  Final result Specimen:  Blood from Arm, Left Updated:  04/25/18 1338     WBC 9 50 Thousand/uL      RBC 4 85 Million/uL      Hemoglobin 13 9 g/dL      Hematocrit 42 1 %      MCV 87 fL      MCH 28 7 pg      MCHC 33 1 g/dL      RDW 13 8 %      MPV 7 6 (L) fL      Platelets 643 Thousands/uL      nRBC 0 /100 WBCs      Neutrophils Relative 70 %      Lymphocytes Relative 22 %      Monocytes Relative 6 %      Eosinophils Relative 2 %      Basophils Relative 0 %      Neutrophils Absolute 6 60 Thousands/µL      Lymphocytes Absolute 2 10 Thousands/µL      Monocytes Absolute 0 60 Thousand/µL      Eosinophils Absolute 0 20 Thousand/µL      Basophils Absolute 0 00 Thousands/µL                  US gallbladder   Final Result by Sophia Corbett MD (04/25 1512)      1  Ectopic right kidney, located at and inferior to the level the iliac crest    2   Otherwise normal sonogram of the right upper quadrant of the abdomen  Workstation performed: [id number]         XR chest 1 view portable   Final Result by Robin Miller MD (04/25 1431)      No acute cardiopulmonary disease  Stable right lower lobe pulmonary nodule  Biopsy recommended at time of recent PET/CT              Workstation performed: [id number]                    Procedures  ECG 12 Lead Documentation  Date/Time: 4/25/2018 1:00 PM  Performed by: Ev Aparicio  Authorized by: Ev Aparicio     Indications / Diagnosis:  Abdominal pain  ECG reviewed by me, the ED Provider: yes    Patient location:  ED  Interpretation:     Interpretation: non-specific    Rate:     ECG rate:  79    ECG rate assessment: normal    Rhythm:     Rhythm: sinus rhythm    Ectopy:     Ectopy: none    QRS:     QRS axis:  Normal    QRS intervals:  Normal  Conduction:     Conduction: normal    ST segments:     ST segments:  Non-specific  T waves:     T waves: normal             Phone Contacts  ED Phone Contact    ED Course  ED Course                                MDM  Number of Diagnoses or Management Options  Diagnosis management comments: Postprandial upper abdominal pain radiating in a bandlike fashion to the back is very suggestive of gallbladder disease particularly in patient with a strong family history  Will check labs and ultrasound    CritCare Time    Disposition  Final diagnoses:   GERD (gastroesophageal reflux disease)   Pain of upper abdomen     Time reflects when diagnosis was documented in both MDM as applicable and the Disposition within this note     Time User Action Codes Description Comment    4/25/2018  3:30 PM Bacilio Sanchez Add [K21 9] GERD (gastroesophageal reflux disease)     4/25/2018  3:30 PM Dalton ARNOLD Add [R10 10] Pain of upper abdomen       ED Disposition     ED Disposition Condition Comment    Discharge  Tangela Vasquezabel discharge to home/self care  Condition at discharge: Stable        Follow-up Information     Follow up With Specialties Details Why Contact Info    Prashant Wagner DO Family Medicine Schedule an appointment as soon as possible for a visit in 1 day  [address] [place] [phone]          Patient's Medications   Discharge Prescriptions    PANTOPRAZOLE (PROTONIX) 20 MG TABLET    Take 1 tablet (20 mg total) by mouth daily       Start Date: 4/25/2018 End Date: --       Order Dose: 20 mg       Quantity: 30 tablet    Refills: 0     No discharge procedures on file      ED Provider  Electronically Signed by           Hector London MD  04/25/18 8593

## 2018-04-26 ENCOUNTER — VBI (OUTPATIENT)
Dept: FAMILY MEDICINE CLINIC | Facility: CLINIC | Age: 63
End: 2018-04-26

## 2018-04-26 LAB
ATRIAL RATE: 79 BPM
P AXIS: 49 DEGREES
PR INTERVAL: 156 MS
QRS AXIS: 28 DEGREES
QRSD INTERVAL: 78 MS
QT INTERVAL: 372 MS
QTC INTERVAL: 426 MS
T WAVE AXIS: 21 DEGREES
VENTRICULAR RATE: 79 BPM

## 2018-04-26 PROCEDURE — 93010 ELECTROCARDIOGRAM REPORT: CPT | Performed by: INTERNAL MEDICINE

## 2018-04-26 NOTE — TELEPHONE ENCOUNTER
Pt was seen in [place] ED on 4/25/18  CC: Abdominal Pain  DX: GERD  Pt is scheduled for 5/4/18 @10 am w/ Dr Lisa English  Pt is aware of the hours, on call, and phone number

## 2018-05-16 ENCOUNTER — OFFICE VISIT (OUTPATIENT)
Dept: FAMILY MEDICINE CLINIC | Facility: CLINIC | Age: 63
End: 2018-05-16
Payer: COMMERCIAL

## 2018-05-16 VITALS
HEIGHT: 62 IN | HEART RATE: 87 BPM | BODY MASS INDEX: 29.44 KG/M2 | DIASTOLIC BLOOD PRESSURE: 68 MMHG | OXYGEN SATURATION: 97 % | SYSTOLIC BLOOD PRESSURE: 112 MMHG | RESPIRATION RATE: 18 BRPM | WEIGHT: 160 LBS | TEMPERATURE: 98.2 F

## 2018-05-16 DIAGNOSIS — R31.29 MICROSCOPIC HEMATURIA: ICD-10-CM

## 2018-05-16 DIAGNOSIS — R91.1 PULMONARY NODULE: ICD-10-CM

## 2018-05-16 DIAGNOSIS — K21.9 GASTROESOPHAGEAL REFLUX DISEASE WITHOUT ESOPHAGITIS: Primary | ICD-10-CM

## 2018-05-16 DIAGNOSIS — F17.200 SMOKER: ICD-10-CM

## 2018-05-16 PROCEDURE — 3725F SCREEN DEPRESSION PERFORMED: CPT | Performed by: FAMILY MEDICINE

## 2018-05-16 PROCEDURE — 99213 OFFICE O/P EST LOW 20 MIN: CPT | Performed by: FAMILY MEDICINE

## 2018-05-16 RX ORDER — PANTOPRAZOLE SODIUM 40 MG/1
40 TABLET, DELAYED RELEASE ORAL DAILY
Qty: 30 TABLET | Refills: 2 | Status: SHIPPED | OUTPATIENT
Start: 2018-05-16 | End: 2019-11-11

## 2018-05-16 RX ORDER — ROSUVASTATIN CALCIUM 5 MG/1
TABLET, COATED ORAL
COMMUNITY
Start: 2018-05-10 | End: 2018-09-26

## 2018-05-16 NOTE — ASSESSMENT & PLAN NOTE
Patient counseled in detail about smoking cessation  Patient refused repeatedly despite discussing implications of her pulmonary nodule, emphysema and risk of cancer

## 2018-05-16 NOTE — PROGRESS NOTES
Assessment/Plan:    Pulmonary nodule  Patient currently follows with pulmonology  Last PET scan Showed 1 4 cm pulmonary nodule which had remained stable from prior CT chest  Patient is scheduled for repeat CAT scan in July  Continue follow-up with pulmonology  Counselled  in detail about smoking cessation however patient declined repeatedly    Gastroesophageal reflux disease without esophagitis  Patient has previously been under the care of Keaton Reagan for her colonoscopy  Last EGD was done 20 years ago where patient states she was diagnosed with Gastritis  She has had prior therapy with both PPI and H2 antagonist   Patient refuses to return to GI for a repeat EGD  Currently on omeprazole 20 mg daily which was prescribed by the ER  Will increase omeprazole to 40 mg daily  Patient advised if symptoms persist or worsen to RTC  Will add H2 antagonist for 2 weeks and refer to GI at that time  Smoker  Patient counseled in detail about smoking cessation  Patient refused repeatedly despite discussing implications of her pulmonary nodule, emphysema and risk of cancer  Microscopic hematuria  Patient has had microscopic hematuria and previous urinalyses  Patient refuses referral to urology for workup  Counseled patient in detail about importance of having a workup to rule out bladder cancer given her history of smoking and hematuria  Patient states she is aware and blatantly refuses urology referral     D/W Dr Giovanna Villalobos       Subjective:      Patient ID: Pepe Brooke is a 63 y o  female  HPI  This is a 60-year-old female smoker with a history of a right pulmonary nodule, hyperlipidemia, emphysema and gastritis who presents for follow-up of her acid reflux  Patient was seen at the emergency room 2 weeks ago for epigastric pain  Blood work was within normal limits and right upper quadrant ultrasound was also negative for gallstones   Of note from her ER labs, urinalysis showed trace blood which the patient states has been chronic  She has never seen a urologist to date  She follows with pulmonology for monitoring of her pulmonary nodule  Last PET scan showing a stable 1 4 cm right  lung nodule and she is due to have a repeat CT of the chest in July  Patient continues to smoke one pack per day of cigarettes which she states she has weaned from 1 5 packs  She has tried Wellbutrin in the past however developed high blood pressure as result of which her cardiologist advised discontinuation of any antismoking medication  She follows with cardiology in [place] where she has her routine blood work  Today patient declines of any blood work done for assessment of diabetes screening or her cholesterol status  Tania Green today continues to complain of persisting mild epigastric pain  She has been noncompliant with diet and continues to eat fried, spicy foods and drinks multiple cups of coffee daily despite advising against this diet  She describes her epigastric pain as dull, nonradiating, graded 4/10 in intensity and minimally relieved by omeprazole 20 mg daily and Tums as needed  She presents today for a refill of her omeprazole  Review of Systems   Constitutional: Negative for appetite change, chills, fatigue and fever  HENT: Negative for congestion, ear pain, rhinorrhea and sore throat  Eyes: Negative for discharge  Respiratory: Negative for cough, chest tightness, shortness of breath, wheezing and stridor  Cardiovascular: Negative for chest pain, palpitations and leg swelling  Gastrointestinal: Negative for abdominal pain, constipation, diarrhea, nausea and vomiting  Genitourinary: Negative for dysuria  Skin: Negative for color change, pallor, rash and wound  Neurological: Negative for dizziness           Objective:      /68 (BP Location: Left arm, Patient Position: Sitting)   Pulse 87   Temp 98 2 °F (36 8 °C) (Tympanic)   Resp 18   Ht 5' 2" (1 575 m)   Wt 72 6 kg (160 lb)   SpO2 97%   BMI 29 26 kg/m²          Physical Exam   Constitutional: She is oriented to person, place, and time  She appears well-developed and well-nourished  No distress  HENT:   Head: Normocephalic and atraumatic  Nose: Nose normal    Mouth/Throat: Oropharynx is clear and moist  No oropharyngeal exudate  Eyes: Conjunctivae are normal  Right eye exhibits no discharge  Left eye exhibits no discharge  No scleral icterus  Neck: Normal range of motion  Neck supple  No JVD present  Cardiovascular: Normal rate, regular rhythm, normal heart sounds and intact distal pulses  Exam reveals no gallop and no friction rub  No murmur heard  Pulmonary/Chest: Effort normal  No stridor  No respiratory distress  She has no wheezes  She has no rales  She exhibits no tenderness  Abdominal: Soft  Bowel sounds are normal  She exhibits no distension and no mass  There is no tenderness  There is no rebound and no guarding  No epigastric discomfort   Musculoskeletal: Normal range of motion  She exhibits no edema, tenderness or deformity  Neurological: She is alert and oriented to person, place, and time  Skin: Skin is warm  No rash noted  She is not diaphoretic  No erythema  No pallor  Psychiatric: She has a normal mood and affect

## 2018-05-16 NOTE — ASSESSMENT & PLAN NOTE
Patient currently follows with pulmonology    Last PET scan Showed 1 4 cm pulmonary nodule which had remained stable from prior CT chest  Patient is scheduled for repeat CAT scan in July  Continue follow-up with pulmonology  Counselled  in detail about smoking cessation however patient declined repeatedly

## 2018-05-16 NOTE — ASSESSMENT & PLAN NOTE
Patient has previously been under the care of Shanel Ventura for her colonoscopy  Last EGD was done 20 years ago where patient states she was diagnosed with Gastritis  She has had prior therapy with both PPI and H2 antagonist   Patient refuses to return to GI for a repeat EGD  Currently on omeprazole 20 mg daily which was prescribed by the ER  Will increase omeprazole to 40 mg daily  Patient advised if symptoms persist or worsen to RTC  Will add H2 antagonist for 2 weeks and refer to GI at that time

## 2018-05-16 NOTE — ASSESSMENT & PLAN NOTE
Patient has had microscopic hematuria and previous urinalyses  Patient refuses referral to urology for workup  Counseled patient in detail about importance of having a workup to rule out bladder cancer given her history of smoking and hematuria   Patient states she is aware and blatantly refuses urology referral

## 2018-07-08 ENCOUNTER — HOSPITAL ENCOUNTER (EMERGENCY)
Facility: HOSPITAL | Age: 63
Discharge: HOME | End: 2018-07-08
Attending: EMERGENCY MEDICINE | Admitting: EMERGENCY MEDICINE
Payer: COMMERCIAL

## 2018-07-08 ENCOUNTER — APPOINTMENT (EMERGENCY)
Dept: RADIOLOGY | Facility: HOSPITAL | Age: 63
End: 2018-07-08
Payer: COMMERCIAL

## 2018-07-08 VITALS
WEIGHT: 158 LBS | SYSTOLIC BLOOD PRESSURE: 178 MMHG | OXYGEN SATURATION: 97 % | BODY MASS INDEX: 28.9 KG/M2 | HEART RATE: 132 BPM | RESPIRATION RATE: 18 BRPM | TEMPERATURE: 98.7 F | DIASTOLIC BLOOD PRESSURE: 86 MMHG

## 2018-07-08 DIAGNOSIS — R20.0 NUMBNESS: ICD-10-CM

## 2018-07-08 DIAGNOSIS — G43.909 MIGRAINE: Primary | ICD-10-CM

## 2018-07-08 LAB
ALBUMIN SERPL BCP-MCNC: 3.7 G/DL (ref 3.5–5)
ALP SERPL-CCNC: 104 U/L (ref 46–116)
ALT SERPL W P-5'-P-CCNC: 24 U/L (ref 12–78)
ANION GAP SERPL CALCULATED.3IONS-SCNC: 11 MMOL/L (ref 4–13)
APTT PPP: 28 SECONDS (ref 24–36)
AST SERPL W P-5'-P-CCNC: 16 U/L (ref 5–45)
BACTERIA UR QL AUTO: ABNORMAL /HPF
BASOPHILS # BLD AUTO: 0.06 THOUSANDS/ΜL (ref 0–0.1)
BASOPHILS NFR BLD AUTO: 1 % (ref 0–1)
BILIRUB SERPL-MCNC: 0.3 MG/DL (ref 0.2–1)
BILIRUB UR QL STRIP: NEGATIVE
BUN SERPL-MCNC: 9 MG/DL (ref 5–25)
CALCIUM SERPL-MCNC: 9.2 MG/DL (ref 8.3–10.1)
CHLORIDE SERPL-SCNC: 104 MMOL/L (ref 100–108)
CLARITY UR: ABNORMAL
CO2 SERPL-SCNC: 25 MMOL/L (ref 21–32)
COLOR UR: YELLOW
CREAT SERPL-MCNC: 0.89 MG/DL (ref 0.6–1.3)
EOSINOPHIL # BLD AUTO: 0.17 THOUSAND/ΜL (ref 0–0.61)
EOSINOPHIL NFR BLD AUTO: 2 % (ref 0–6)
ERYTHROCYTE [DISTWIDTH] IN BLOOD BY AUTOMATED COUNT: 13.9 % (ref 11.6–15.1)
GFR SERPL CREATININE-BSD FRML MDRD: 71 ML/MIN/1.73SQ M
GLUCOSE SERPL-MCNC: 169 MG/DL (ref 65–140)
GLUCOSE UR STRIP-MCNC: NEGATIVE MG/DL
HCT VFR BLD AUTO: 43.2 % (ref 34.8–46.1)
HGB BLD-MCNC: 14.1 G/DL (ref 11.5–15.4)
HGB UR QL STRIP.AUTO: ABNORMAL
IMM GRANULOCYTES # BLD AUTO: 0.04 THOUSAND/UL (ref 0–0.2)
IMM GRANULOCYTES NFR BLD AUTO: 0 % (ref 0–2)
INR PPP: 0.96 (ref 0.86–1.16)
KETONES UR STRIP-MCNC: NEGATIVE MG/DL
LEUKOCYTE ESTERASE UR QL STRIP: ABNORMAL
LIPASE SERPL-CCNC: 361 U/L (ref 73–393)
LYMPHOCYTES # BLD AUTO: 2.01 THOUSANDS/ΜL (ref 0.6–4.47)
LYMPHOCYTES NFR BLD AUTO: 22 % (ref 14–44)
MCH RBC QN AUTO: 28.6 PG (ref 26.8–34.3)
MCHC RBC AUTO-ENTMCNC: 32.6 G/DL (ref 31.4–37.4)
MCV RBC AUTO: 88 FL (ref 82–98)
MONOCYTES # BLD AUTO: 0.42 THOUSAND/ΜL (ref 0.17–1.22)
MONOCYTES NFR BLD AUTO: 5 % (ref 4–12)
MUCOUS THREADS UR QL AUTO: ABNORMAL
NEUTROPHILS # BLD AUTO: 6.27 THOUSANDS/ΜL (ref 1.85–7.62)
NEUTS SEG NFR BLD AUTO: 70 % (ref 43–75)
NITRITE UR QL STRIP: NEGATIVE
NON-SQ EPI CELLS URNS QL MICRO: ABNORMAL /HPF
NRBC BLD AUTO-RTO: 0 /100 WBCS
PH UR STRIP.AUTO: 5.5 [PH] (ref 5–9)
PLATELET # BLD AUTO: 237 THOUSANDS/UL (ref 149–390)
PMV BLD AUTO: 9.6 FL (ref 8.9–12.7)
POTASSIUM SERPL-SCNC: 3.4 MMOL/L (ref 3.5–5.3)
PROT SERPL-MCNC: 7.1 G/DL (ref 6.4–8.2)
PROT UR STRIP-MCNC: NEGATIVE MG/DL
PROTHROMBIN TIME: 10.1 SECONDS (ref 9.4–11.7)
RBC # BLD AUTO: 4.93 MILLION/UL (ref 3.81–5.12)
RBC #/AREA URNS AUTO: ABNORMAL /HPF
SODIUM SERPL-SCNC: 140 MMOL/L (ref 136–145)
SP GR UR STRIP.AUTO: 1.02 (ref 1–1.03)
TROPONIN I SERPL-MCNC: <0.02 NG/ML
UROBILINOGEN UR QL STRIP.AUTO: 0.2 E.U./DL
WBC # BLD AUTO: 8.97 THOUSAND/UL (ref 4.31–10.16)
WBC #/AREA URNS AUTO: ABNORMAL /HPF

## 2018-07-08 PROCEDURE — 84484 ASSAY OF TROPONIN QUANT: CPT | Performed by: EMERGENCY MEDICINE

## 2018-07-08 PROCEDURE — 70450 CT HEAD/BRAIN W/O DYE: CPT

## 2018-07-08 PROCEDURE — 36415 COLL VENOUS BLD VENIPUNCTURE: CPT | Performed by: EMERGENCY MEDICINE

## 2018-07-08 PROCEDURE — 80053 COMPREHEN METABOLIC PANEL: CPT | Performed by: EMERGENCY MEDICINE

## 2018-07-08 PROCEDURE — 85025 COMPLETE CBC W/AUTO DIFF WBC: CPT | Performed by: EMERGENCY MEDICINE

## 2018-07-08 PROCEDURE — 71045 X-RAY EXAM CHEST 1 VIEW: CPT

## 2018-07-08 PROCEDURE — 85610 PROTHROMBIN TIME: CPT | Performed by: EMERGENCY MEDICINE

## 2018-07-08 PROCEDURE — 96360 HYDRATION IV INFUSION INIT: CPT

## 2018-07-08 PROCEDURE — 93005 ELECTROCARDIOGRAM TRACING: CPT

## 2018-07-08 PROCEDURE — 81001 URINALYSIS AUTO W/SCOPE: CPT | Performed by: EMERGENCY MEDICINE

## 2018-07-08 PROCEDURE — 99285 EMERGENCY DEPT VISIT HI MDM: CPT

## 2018-07-08 PROCEDURE — 85730 THROMBOPLASTIN TIME PARTIAL: CPT | Performed by: EMERGENCY MEDICINE

## 2018-07-08 PROCEDURE — 83690 ASSAY OF LIPASE: CPT | Performed by: EMERGENCY MEDICINE

## 2018-07-08 RX ORDER — PROCHLORPERAZINE MALEATE 10 MG
5 TABLET ORAL EVERY 6 HOURS PRN
Qty: 10 TABLET | Refills: 0 | Status: SHIPPED | OUTPATIENT
Start: 2018-07-08 | End: 2018-09-26

## 2018-07-08 RX ORDER — ASPIRIN 81 MG/1
81 TABLET, CHEWABLE ORAL DAILY
COMMUNITY

## 2018-07-08 RX ADMIN — SODIUM CHLORIDE 1000 ML: 0.9 INJECTION, SOLUTION INTRAVENOUS at 14:23

## 2018-07-08 NOTE — ED PROVIDER NOTES
History  Chief Complaint   Patient presents with    Facial Numbness     pt started with a headache for 2 days, now headache is gone per pt  pt c/o l sided facial numbness 2 days ago     62F hx hyperlipidemia, migraines, smoker, "possible cancer" in lung and liver but never got workup, c/o L sided facial numbness x 3 days  Did have a headache but not at this time  Also has L arm and L leg numbness, associated with slight L leg weakness "for about 6 months" but unsure of onset  Saw a neurologist 5 years ago for "strange numbness like this" and was told the workup was negative  At some point was advised to take a daily ASA but noncompliant  Prior to Admission Medications   Prescriptions Last Dose Informant Patient Reported? Taking?   aspirin 81 mg chewable tablet   Yes Yes   Sig: Chew 81 mg daily   pantoprazole (PROTONIX) 40 mg tablet   No Yes   Sig: Take 1 tablet (40 mg total) by mouth daily   rosuvastatin (CRESTOR) 5 mg tablet   Yes No      Facility-Administered Medications: None       Past Medical History:   Diagnosis Date    Acid reflux     Anxiety     Arthritis     Cancer (HCC)     skin on chin    Chronic kidney disease     (R) kidney smaller than (L),  kidney stones    Chronic UTI (urinary tract infection)     COPD (chronic obstructive pulmonary disease) (HCC)     mild, recent dx    Depression     Epidermal cyst of face     last assessed 10-   [facility] (hard of hearing)     deaf (L) ear, decreased hearing (R) ear    Hyperlipemia     diet controlled    Hyperlipidemia     IBS (irritable bowel syndrome)     Malignant neoplasm of skin     Meniere disease     Uterine leiomyoma        Past Surgical History:   Procedure Laterality Date    COLONOSCOPY N/A 11/4/2016    Procedure: COLONOSCOPY;  Surgeon: Yogi Brown MD;  Location[facility]; Service:     ERCP      ESOPHAGOGASTRODUODENOSCOPY N/A 11/4/2016    Procedure: ESOPHAGOGASTRODUODENOSCOPY (EGD);   Surgeon: Karen Mitchell Maurice Layton MD;  Location: [facility] GI LAB; Service:     EYE SURGERY       [address] CATARACT EXTRACAP,INSERT LENS Left 3/7/2016    Procedure: EXTRACTION EXTRACAPSULAR CATARACT PHACO INTRAOCULAR LENS (IOL); Surgeon: William Albarran MD;  Location: [facility] MAIN OR;  Service: Ophthalmology    SKIN CANCER EXCISION      excision skin cancer on chin       Family History   Problem Relation Age of Onset    Heart disease Mother         cardiac disorder    Colonic polyp Sister     Osteoporosis Family     Colon cancer Family     Heart disease Family         cardiac disorder    Heart disease Family         cardiac disorder    Colon cancer Family     Lung cancer Family     Cancer Family         anterior wall of urinanry bladder    Breast cancer Family     Other Family         brain tumor    Breast cancer Cousin      I have reviewed and agree with the history as documented  Social History   Substance Use Topics    Smoking status: Current Every Day Smoker     Packs/day: 1 00     Years: 43 00     Types: Cigarettes    Smokeless tobacco: Never Used      Comment: 45+ years per allscripts    Alcohol use No        Review of Systems   Constitutional: Negative for fever  Respiratory: Negative for cough  Cardiovascular: Negative for chest pain  Gastrointestinal: Negative for abdominal pain  Musculoskeletal: Negative for back pain  Neurological: Positive for weakness, numbness and headaches  All other systems reviewed and are negative  Physical Exam  Physical Exam   Constitutional: She is oriented to person, place, and time  She appears well-developed  HENT:   Mouth/Throat: Oropharynx is clear and moist    Eyes: Conjunctivae are normal    Neck: Neck supple  Cardiovascular: Regular rhythm  tachycardic   Pulmonary/Chest: Effort normal and breath sounds normal    Abdominal: Soft  Bowel sounds are normal    Neurological: She is alert and oriented to person, place, and time  Slight L facial droop    Diminished sensation to left lower face, LUE/LLE    4/5 LLE strength, falls but does not touch bed  Skin: Skin is warm and dry  Psychiatric: She has a normal mood and affect  Vitals reviewed        Vital Signs  ED Triage Vitals [07/08/18 1410]   Temperature Pulse Respirations Blood Pressure SpO2   98 7 °F (37 1 °C) (!) 134 20 (!) 198/90 98 %      Temp Source Heart Rate Source Patient Position - Orthostatic VS BP Location FiO2 (%)   Tympanic Monitor Lying Left arm --      Pain Score       --           Vitals:    07/08/18 1410 07/08/18 1415 07/08/18 1420   BP: (!) 198/90 (!) 198/90 (!) 178/86   Pulse: (!) 134 (!) 132    Patient Position - Orthostatic VS: Lying         Visual Acuity      ED Medications  Medications   sodium chloride 0 9 % bolus 1,000 mL (0 mL Intravenous Stopped 7/8/18 1523)       Diagnostic Studies  Results Reviewed     Procedure Component Value Units Date/Time    Urine Microscopic [[id number]]  (Abnormal) Collected:  07/08/18 1500    Lab Status:  Final result Specimen:  Urine from Urine, Clean Catch Updated:  07/08/18 1518     RBC, UA 1-2 (A) /hpf      WBC, UA 4-10 (A) /hpf      Epithelial Cells Innumerable (A) /hpf      Bacteria, UA Occasional /hpf      MUCOUS THREADS Moderate (A)    UA w Reflex to Microscopic [[id number]]  (Abnormal) Collected:  07/08/18 1500    Lab Status:  Final result Specimen:  Urine from Urine, Clean Catch Updated:  07/08/18 1506     Color, UA Yellow     Clarity, UA Slightly Cloudy     Specific [place], UA 1 020     pH, UA 5 5     Leukocytes, UA Small (A)     Nitrite, UA Negative     Protein, UA Negative mg/dl      Glucose, UA Negative mg/dl      Ketones, UA Negative mg/dl      Urobilinogen, UA 0 2 E U /dl      Bilirubin, UA Negative     Blood, UA Trace-lysed (A)    Protime-INR [[id number]]  (Normal) Collected:  07/08/18 1423    Lab Status:  Final result Specimen:  Blood from Arm, Right Updated:  07/08/18 1505     Protime 10 1 seconds      INR 0 96    APTT [[id number]]  (Normal) Collected: 07/08/18 1423    Lab Status:  Final result Specimen:  Blood from Arm, Right Updated:  07/08/18 1505     PTT 28 seconds     Troponin I [[id number]]  (Normal) Collected:  07/08/18 1422    Lab Status:  Final result Specimen:  Blood from Arm, Right Updated:  07/08/18 1454     Troponin I <0 02 ng/mL     Comprehensive metabolic panel [[id number]]  (Abnormal) Collected:  07/08/18 1423    Lab Status:  Final result Specimen:  Blood from Arm, Right Updated:  07/08/18 1449     Sodium 140 mmol/L      Potassium 3 4 (L) mmol/L      Chloride 104 mmol/L      CO2 25 mmol/L      Anion Gap 11 mmol/L      BUN 9 mg/dL      Creatinine 0 89 mg/dL      Glucose 169 (H) mg/dL      Calcium 9 2 mg/dL      AST 16 U/L      ALT 24 U/L      Alkaline Phosphatase 104 U/L      Total Protein 7 1 g/dL      Albumin 3 7 g/dL      Total Bilirubin 0 30 mg/dL      eGFR 71 ml/min/1 73sq m     Narrative:         National Kidney Disease Education Program recommendations are as follows:  GFR calculation is accurate only with a steady state creatinine  Chronic Kidney disease less than 60 ml/min/1 73 sq  meters  Kidney failure less than 15 ml/min/1 73 sq  meters      Lipase [[id number]]  (Normal) Collected:  07/08/18 1423    Lab Status:  Final result Specimen:  Blood from Arm, Right Updated:  07/08/18 1449     Lipase 361 u/L     CBC and differential [[id number]] Collected:  07/08/18 1422    Lab Status:  Final result Specimen:  Blood from Arm, Right Updated:  07/08/18 1431     WBC 8 97 Thousand/uL      RBC 4 93 Million/uL      Hemoglobin 14 1 g/dL      Hematocrit 43 2 %      MCV 88 fL      MCH 28 6 pg      MCHC 32 6 g/dL      RDW 13 9 %      MPV 9 6 fL      Platelets 103 Thousands/uL      nRBC 0 /100 WBCs      Neutrophils Relative 70 %      Immat GRANS % 0 %      Lymphocytes Relative 22 %      Monocytes Relative 5 %      Eosinophils Relative 2 %      Basophils Relative 1 %      Neutrophils Absolute 6 27 Thousands/µL      Immature Grans Absolute 0 04 Thousand/uL Lymphocytes Absolute 2 01 Thousands/µL      Monocytes Absolute 0 42 Thousand/µL      Eosinophils Absolute 0 17 Thousand/µL      Basophils Absolute 0 06 Thousands/µL                  XR chest 1 view portable   Final Result by Chucho Mirza MD (07/09 0914)      Stable 14 mm nodular density at the right lung base  No new lung findings  Workstation performed: [id number]         CT head without contrast   Final Result by Sarita Culver MD (07/08 1448)      No acute intracranial abnormality  Workstation performed: [id number]                    Procedures  Procedures       Phone Contacts  ED Phone Contact    ED Course                               MDM  Number of Diagnoses or Management Options  Migraine:   Numbness:   Diagnosis management comments: NIHSS 3, however very unclear time of onset, maybe even 6 months  Not a tpa candidate for that reason and code stroke not activated  Workup in ED unremarkable  Further discussion with pt reveals she has had intermittent L-sided weakness for months and has already been worked up by neurology -- discussed with [facility] resident who will help facilitate outpt f/u -- pt agrees to call neurologist for outpt f/u  CritCare Time    Disposition  Final diagnoses:   Migraine   Numbness     Time reflects when diagnosis was documented in both MDM as applicable and the Disposition within this note     Time User Action Codes Description Comment    7/8/2018  3:45 PM [place] [W76 851] Migraine     7/8/2018  3:45 PM Sanjeev Becker Add [R20 0] Numbness       ED Disposition     ED Disposition Condition Comment    Discharge  Chuckie Blizzard Haydu discharge to home/self care      Condition at discharge: Stable        Follow-up Information     Follow up With Specialties Details Why [address] [facility], DO Family Medicine In 1 day  [address] [facility]  [phone]            Discharge Medication List as of 7/8/2018 3:53 PM      START taking these medications    Details   prochlorperazine (COMPAZINE) 10 mg tablet Take 0 5 tablets (5 mg total) by mouth every 6 (six) hours as needed for nausea or vomiting Or headache, Starting Sun 7/8/2018, Print         CONTINUE these medications which have NOT CHANGED    Details   aspirin 81 mg chewable tablet Chew 81 mg daily, Historical Med      pantoprazole (PROTONIX) 40 mg tablet Take 1 tablet (40 mg total) by mouth daily, Starting Wed 5/16/2018, Normal      rosuvastatin (CRESTOR) 5 mg tablet Starting Thu 5/10/2018, Historical Med           No discharge procedures on file      ED Provider  Electronically Signed by           Lo Banegas,   07/10/18 9066

## 2018-07-08 NOTE — DISCHARGE INSTRUCTIONS
We did not discover a definite cause for your head numbness and 6 months of intermittent left leg weakness  Your CT of the head was negative  It is very important that you follow up with your neurologist        Migraine Headache   WHAT YOU SHOULD KNOW:   A migraine is a severe headache  The pain can be so severe that it interferes with your daily activities  A migraine can last a few hours up to several days  The exact cause of migraines is not known  It may be caused by changes in your body chemicals and extra sensitive nerves in your brain  AFTER YOU LEAVE:   Medicines:  Take medicine as soon as you feel a migraine begin  · Pain medicine: You may need medicine to take away or decrease pain  You may need a doctor's order for this medicine  Do not wait until the pain is severe before you take your medicine  · Migraine medicines: These are used to help prevent a migraine or stop it once it starts  · Antinausea medicine: This medicine may be given to calm your stomach and to help prevent vomiting  They can also help relieve pain  · Take your medicine as directed  Call your healthcare provider if you think your medicine is not helping or if you have side effects  Tell him if you are allergic to any medicine  Keep a list of the medicines, vitamins, and herbs you take  Include the amounts, and when and why you take them  Bring the list or the pill bottles to follow-up visits  Carry your medicine list with you in case of an emergency  Manage your symptoms:   · Rest:  Rest in a dark, quiet room  This will help decrease your pain  · Ice:  Ice helps decrease pain  Use an ice pack or put crushed ice in a plastic bag  Cover the ice pack with a towel and place it on your head where it hurts for 15 to 20 minutes every hour  · Heat:  Heat helps decrease pain and muscle spasms  Use a small towel dampened with warm water or a heating pad, or sit in a warm bath   Apply heat on the area for 20 to 30 minutes every 2 hours  You may alternate heat and ice  Keep a headache diary:  Write down when your migraines start and stop  Include your symptoms and what you were doing when a migraine began  Record what you ate or drank for 24 hours before the migraine started  Describe the pain and where it hurts  Keep track of what you did to treat your migraine and whether it worked  Follow up with your primary healthcare provider or neurologist as directed:  Bring your headache diary with you when you see your primary healthcare provider  Write down your questions so you remember to ask them during your visits  Prevent another migraine:   · Do not smoke: If you smoke, it is never too late to quit  Tobacco smoke can trigger a migraine  It can also cause heart disease, lung disease, cancer, and other health problems  Quitting smoking will improve your health and the health of those around you  If you smoke, ask for information about how to stop  · Do not drink alcohol:  Alcohol can trigger a migraine  It can also interfere with the medicines used to treat your migraine  · Get regular exercise:  Exercise may help prevent migraines  Talk to your primary healthcare provider about the best exercise plan for you  · Manage stress:  Stress may trigger a migraine  Learn new ways to relax, such as deep breathing  · Stick to a sleep schedule:  Go to bed and get up at the same time each day  · Eat regular meals:  Include healthy foods such as include fruit, vegetables, whole-grain breads, low-fat dairy products, beans, lean meat, and fish  Avoid trigger foods like chocolate, hard cheese, and red wine  Foods that contain gluten, nitrates, MSG, or artificial sweeteners may also trigger migraines  Caffeine, which is often used to treat migraines, can also trigger them  Contact your primary healthcare provider or neurologist if:   · You have a fever  · Your migraines interfere with your daily activities       · Your medicines or treatments stop working  · You have questions about your condition or care  Seek care immediately or call 911 if:   · You have a headache that seems different or much worse than your usual migraine headache  · You have a severe headache with a fever or a stiff neck  · You have new problems with speech, vision, balance, or movement  · You feel like you are going to faint, you become confused, or you have a seizure  © 2014 [address] is for End User's use only and may not be sold, redistributed or otherwise used for commercial purposes  All illustrations and images included in CareNotes® are the copyrighted property of A D A M , Inc  or Leo Garland  The above information is an  only  It is not intended as medical advice for individual conditions or treatments  Talk to your doctor, nurse or pharmacist before following any medical regimen to see if it is safe and effective for you

## 2018-07-09 LAB
ATRIAL RATE: 119 BPM
P AXIS: 62 DEGREES
PR INTERVAL: 142 MS
QRS AXIS: 17 DEGREES
QRSD INTERVAL: 78 MS
QT INTERVAL: 318 MS
QTC INTERVAL: 447 MS
T WAVE AXIS: 62 DEGREES
VENTRICULAR RATE: 119 BPM

## 2018-07-09 PROCEDURE — 93010 ELECTROCARDIOGRAM REPORT: CPT | Performed by: INTERNAL MEDICINE

## 2018-07-10 ENCOUNTER — VBI (OUTPATIENT)
Dept: FAMILY MEDICINE CLINIC | Facility: CLINIC | Age: 63
End: 2018-07-10

## 2018-07-10 NOTE — TELEPHONE ENCOUNTER
Pt was seen on 7/8/18  CC Facial Numbness  DX: Migraine; Numbness  [place] F/u appt @ [facility] 7/20/18  Informed pt of  on call, office hours and phone number

## 2018-07-20 ENCOUNTER — OFFICE VISIT (OUTPATIENT)
Dept: FAMILY MEDICINE CLINIC | Facility: CLINIC | Age: 63
End: 2018-07-20
Payer: COMMERCIAL

## 2018-07-20 VITALS
BODY MASS INDEX: 28.89 KG/M2 | SYSTOLIC BLOOD PRESSURE: 132 MMHG | TEMPERATURE: 98.7 F | WEIGHT: 157 LBS | DIASTOLIC BLOOD PRESSURE: 74 MMHG | HEART RATE: 78 BPM | HEIGHT: 62 IN | OXYGEN SATURATION: 97 % | RESPIRATION RATE: 18 BRPM

## 2018-07-20 DIAGNOSIS — R20.0 LEFT FACIAL NUMBNESS: ICD-10-CM

## 2018-07-20 DIAGNOSIS — R91.1 PULMONARY NODULE: ICD-10-CM

## 2018-07-20 DIAGNOSIS — H92.23 EAR BLEEDING, BILATERAL: ICD-10-CM

## 2018-07-20 DIAGNOSIS — G44.209 ACUTE NON INTRACTABLE TENSION-TYPE HEADACHE: Primary | ICD-10-CM

## 2018-07-20 DIAGNOSIS — F17.200 SMOKER: ICD-10-CM

## 2018-07-20 PROCEDURE — 99213 OFFICE O/P EST LOW 20 MIN: CPT | Performed by: FAMILY MEDICINE

## 2018-07-20 NOTE — PROGRESS NOTES
Assessment/Plan:      Diagnoses and all orders for this visit:    Acute non intractable tension-type headache  -     Ambulatory referral to Neurology; Future    Left facial numbness  -     Ambulatory referral to Neurology; Future    Pulmonary nodule    Smoker    Ear bleeding, bilateral  -     Ambulatory Referral to Otolaryngology; Future       Pt referred to Neuro for her HA, & numbness  She already has an appt pending  Pt also would like to see ENT for the bleeding and the sinus issues  She can follow up with me as needed, but likely will find better help with these two  Subjective:   Patient ID: Erick Kapoor is a 63 y o  female  HPI  Monster Pollock is here today for follow-up from ED on 07/18 for migraine headache with left face weakness/numbness  While in the ED she was given Compazine  She was told to follow [facility] with [facility]  She had a chest x-ray done which showed continued right lung base nodule, as well as a CT head done which was normal   Troponin, EKG, and lab work were all normal  Pt still feels numbness in her left face attributes it to her sinus infections  Also notes chronic nose running, & that her nasal discharge has changed  Feels different than it used to  Her HA is gone, but her face still feels numb  Pt recalls that the day she went to the ED, she had a small blood spot on her pillow where her ear was and when she used swabs she noted blood on them bilaterally  She was worried & knew something was odd, so she went to the ED  Has not seen this again since  Review of Systems   Constitutional: Negative for chills and fever  HENT: Positive for congestion and rhinorrhea (Nasal mucus smells, feels, & flows differently  )  Negative for postnasal drip, sinus pain and sore throat  Respiratory: Negative for cough, choking, chest tightness, shortness of breath and wheezing  Cardiovascular: Negative for chest pain, palpitations and leg swelling     Gastrointestinal: Positive for nausea  Negative for abdominal pain, constipation, diarrhea and vomiting  Genitourinary: Negative for dysuria and urgency  Musculoskeletal: Negative for arthralgias and back pain  Neurological: Positive for dizziness, facial asymmetry, numbness (Left face) and headaches  Negative for tremors and light-headedness  Psychiatric/Behavioral: Negative for decreased concentration  The patient is not nervous/anxious  Objective:  Vitals:    07/20/18 0839   BP: 132/74   Pulse: 78   Resp: 18   Temp: 98 7 °F (37 1 °C)   SpO2: 97%      Physical Exam   Constitutional: She is oriented to person, place, and time  She appears well-developed and well-nourished  No distress  HENT:   Head: Normocephalic and atraumatic  Right Ear: Tympanic membrane, external ear and ear canal normal    Left Ear: Tympanic membrane, external ear and ear canal normal    Nose: Nose normal    B/L TMs appear to have mucus behind them, possible chronic otitis effusions  Eyes: Right eye exhibits no discharge  Left eye exhibits no discharge  No scleral icterus  Neck: Normal range of motion  Neck supple  Cardiovascular: Normal rate, regular rhythm, normal heart sounds and intact distal pulses  Exam reveals no gallop and no friction rub  No murmur heard  Pulmonary/Chest: Effort normal and breath sounds normal  No stridor  No respiratory distress  She has no wheezes  She has no rales  She exhibits no tenderness  Abdominal: Soft  Bowel sounds are normal  She exhibits no distension and no mass  There is no tenderness  There is no rebound and no guarding  Musculoskeletal: Normal range of motion  She exhibits no edema, tenderness or deformity  Neurological: She is alert and oriented to person, place, and time  MSR intact b/l in face & extremities  Describes decreased sensation in left face  Skin: Skin is warm and dry  No rash noted  She is not diaphoretic  No erythema  No pallor     Psychiatric: She has a normal mood and affect  Her behavior is normal  Judgment and thought content normal    Vitals reviewed

## 2018-07-24 ENCOUNTER — HOSPITAL ENCOUNTER (OUTPATIENT)
Dept: RADIOLOGY | Facility: HOSPITAL | Age: 63
Discharge: HOME | End: 2018-07-24
Payer: COMMERCIAL

## 2018-07-24 ENCOUNTER — APPOINTMENT (OUTPATIENT)
Dept: RADIOLOGY | Facility: HOSPITAL | Age: 63
End: 2018-07-24
Attending: OTOLARYNGOLOGY
Payer: COMMERCIAL

## 2018-07-24 ENCOUNTER — TRANSCRIBE ORDERS (OUTPATIENT)
Dept: ADMINISTRATIVE | Facility: HOSPITAL | Age: 63
End: 2018-07-24

## 2018-07-24 DIAGNOSIS — J32.9 SINUSITIS, UNSPECIFIED CHRONICITY, UNSPECIFIED LOCATION: ICD-10-CM

## 2018-07-24 DIAGNOSIS — J32.9 SINUSITIS, UNSPECIFIED CHRONICITY, UNSPECIFIED LOCATION: Primary | ICD-10-CM

## 2018-07-24 DIAGNOSIS — J43.2 CENTRILOBULAR EMPHYSEMA (HCC): ICD-10-CM

## 2018-07-24 PROCEDURE — 70486 CT MAXILLOFACIAL W/O DYE: CPT

## 2018-07-24 PROCEDURE — 71250 CT THORAX DX C-: CPT

## 2018-07-30 NOTE — PROGRESS NOTES
CT of chest reviewed  There is a stable 15 mm x 13 mm right lower lobe nodule that is unchanged since December 15, 2017  This lesion measure 12 x 12 mm in January 2015 and 6 mm of April 2008 shows lipoma this elements  10 mm left thyroid hypodensity this is without suspicious features no further evaluation recommended  Left message on patient's cell phone    I will review with collapse rating physician regarding follow-up

## 2018-08-03 ENCOUNTER — OFFICE VISIT (OUTPATIENT)
Dept: PULMONOLOGY | Facility: MEDICAL CENTER | Age: 63
End: 2018-08-03
Payer: COMMERCIAL

## 2018-08-03 ENCOUNTER — TELEPHONE (OUTPATIENT)
Dept: NEUROLOGY | Facility: CLINIC | Age: 63
End: 2018-08-03

## 2018-08-03 VITALS
DIASTOLIC BLOOD PRESSURE: 86 MMHG | SYSTOLIC BLOOD PRESSURE: 142 MMHG | BODY MASS INDEX: 28.71 KG/M2 | WEIGHT: 156 LBS | HEART RATE: 70 BPM | RESPIRATION RATE: 12 BRPM | OXYGEN SATURATION: 98 % | HEIGHT: 62 IN | TEMPERATURE: 97.9 F

## 2018-08-03 DIAGNOSIS — F17.210 NICOTINE DEPENDENCE, CIGARETTES, UNCOMPLICATED: ICD-10-CM

## 2018-08-03 DIAGNOSIS — R91.1 LUNG NODULE: ICD-10-CM

## 2018-08-03 DIAGNOSIS — G47.10 HYPERSOMNIA: Primary | ICD-10-CM

## 2018-08-03 PROCEDURE — 99214 OFFICE O/P EST MOD 30 MIN: CPT | Performed by: INTERNAL MEDICINE

## 2018-08-03 NOTE — TELEPHONE ENCOUNTER
The patient was seen in the ER in the beginning of July for head pain and facial numbness  She had seen you last year for a similar complaint so I scheduled her for an appointment to see you early September  She wants to know if you could order the MRI that you had wanted her to have at the last visit

## 2018-08-03 NOTE — PROGRESS NOTES
Assessment/Plan:     Problem List Items Addressed This Visit        Other    Lung nodule     I reviewed recent CT scan of the chest without contrast done July 24, 2018 with Christa Hernandez  There is a 15 x 13 mm smooth bordered right lower lobe lung nodule which by Hounsfield units shows some lipomatous element  No significant change compared to CT of chest done December 15, 2017  This nodule was initially detected on a CT scan of the abdomen in May 2016 and measured 13 x 10 mm then  Prior PET CT scan done January 25, 2018 showed is nodule did have only borderline SUV uptake of 2 4  Thus far there has been no significant growth in his nodule to suggest malignancy however given her persistent smoking 1 pack per day and significant smoking history, I will order a follow-up CT scan of the chest in 6 months  This will be done without contrast   I also gave her the option of his CT-guided needle biopsy but she did not want to have that done at this time  Relevant Orders    CT chest without contrast    Hypersomnia - Primary     Christa Hernandez complains of daytime fatigue oand snoring  [place] sleepiness score is 8 and Mallampati score is 2  I did discuss with her what MARY is and how it is treated  She wanted to pursue a diagnostic sleep study  I ordered a home study  Relevant Orders    Home Study      Other Visit Diagnoses     Nicotine dependence, cigarettes, uncomplicated        Relevant Medications    nicotine (NICOTROL) 10 MG inhaler            No Follow-up on file  All questions are answered to the patient's satisfaction and understanding  She verbalizes understanding  She is encouraged to call with any further questions or concerns  Portions of the record may have been created with voice recognition software  Occasional wrong word or "sound a like" substitutions may have occurred due to the inherent limitations of voice recognition software    Read the chart carefully and recognize, using context, where substitutions have occurred  Electronically Signed by Romulo Xie DO    ______________________________________________________________________    Chief Complaint:   Chief Complaint   Patient presents with    Follow-up     7m    sinus problems     numbness left side of face/ back of head       Patient ID: Brittney Jenkins is a 63 y o  y o  female has a past medical history of Acid reflux; Anxiety; Arthritis; Cancer ([facility] [address] ); Chronic kidney disease; Chronic UTI (urinary tract infection); COPD (chronic obstructive pulmonary disease) ([facility] [address] ); Depression; Epidermal cyst of face; [place] (hard of hearing); Hyperlipemia; Hyperlipidemia; IBS (irritable bowel syndrome); Malignant neoplasm of skin; Meniere disease; Numbness and tingling of left side of face; and Uterine leiomyoma  8/3/2018  HPI     Brittney Jenkins presents for a follow up visit  Spirometry done in January 2018 showed FEV1 of 1 84 L or 81% of predicted  She is still smoking about 1 pack of cigarretes per day  She is not using any inhalers  Has occasional shortness of breath with activity  She does complain of daytime fatigue  ESS is 8  She does snore  No nocturnal dsypnea or gasping for air  No weight loss  Brittney Jenkins complains of pain along her left face and head  She has had this in the past but hes been bothering her a few weeks now  A CT of her sinuses done 7/24 showed only mild mucosal thickening of the paranasal sinuses and left sphenoid sinus  She did have a follow up CT of her chest on 7/24  in regard to a right lower lobe nodule which was initially detected on CT scan of abdomen in May 2016 and it measured 13 x 10 mm then  She did have a PET CT scan done January 25, 2018 which showed this RLL nodule to be approximately 14 x 14 mm with borderline SUV uptake of 2 4  Otherwise PET-CT scan was unremarkable  Review of Systems   Constitutional: Negative for chills, fever and unexpected weight change     HENT: Negative for congestion, rhinorrhea and sore throat  Eyes: Negative for discharge and redness  Respiratory: Negative for shortness of breath  Does get periodic cough and wheeze  Cardiovascular: Negative for chest pain, palpitations and leg swelling  Gastrointestinal: Negative for abdominal distention, abdominal pain and nausea  Endocrine: Negative for polydipsia and polyphagia  Genitourinary: Negative for dysuria  Musculoskeletal: Negative for joint swelling and myalgias  Skin: Negative for rash  Neurological: Negative for light-headedness  Complains of some persistent numbness left mastoid region into the left part of face    Has had 5-6 migraine headaches over the past 1 year  These usually occur at night        Smoking history: She reports that she has been smoking Cigarettes  She has a 43 00 pack-year smoking history  She has never used smokeless tobacco     The following portions of the patient's history were reviewed and updated as appropriate: allergies, current medications, past family history, past medical history, past social history, past surgical history and problem list       There is no immunization history on file for this patient  Current Outpatient Prescriptions   Medication Sig Dispense Refill    aspirin 81 mg chewable tablet Chew 81 mg daily      pantoprazole (PROTONIX) 40 mg tablet Take 1 tablet (40 mg total) by mouth daily 30 tablet 2    rosuvastatin (CRESTOR) 5 mg tablet       nicotine (NICOTROL) 10 MG inhaler Inhale 1 puff as needed for smoking cessation Use 1 cartridge per hour up to 10 a day as needed 168 each 0    prochlorperazine (COMPAZINE) 10 mg tablet Take 0 5 tablets (5 mg total) by mouth every 6 (six) hours as needed for nausea or vomiting Or headache 10 tablet 0     No current facility-administered medications for this visit  Allergies: Axid [nizatidine]; Erythromycin; Penicillins; Wellbutrin [bupropion]; Ciprofloxacin hcl; Fish-derived products;  Other; Ampicillin; Benadryl [diphenhydramine hcl]; Ciprofloxacin; Clindamycin; Doxycycline; Lovastatin; Nickel; and Shellfish-derived products    Objective:  Vitals:    08/03/18 1028   BP: 142/86   BP Location: Left arm   Patient Position: Sitting   Cuff Size: Adult   Pulse: 70   Resp: 12   Temp: 97 9 °F (36 6 °C)   TempSrc: Oral   SpO2: 98%   Weight: 70 8 kg (156 lb)   Height: 5' 2" (1 575 m)   Oxygen Therapy  SpO2: 98 %    Wt Readings from Last 3 Encounters:   08/03/18 70 8 kg (156 lb)   07/20/18 71 2 kg (157 lb)   07/08/18 71 7 kg (158 lb)     Body mass index is 28 53 kg/m²  Physical Exam   Constitutional: She is oriented to person, place, and time  She appears well-developed and well-nourished  No distress  HENT:   Head: Normocephalic  Nose: Nose normal    Mouth/Throat: Oropharynx is clear and moist  No oropharyngeal exudate  Mallampati score is 2   Eyes: Conjunctivae are normal  Pupils are equal, round, and reactive to light  Neck: Neck supple  No JVD present  No tracheal deviation present  Cardiovascular: Normal rate, regular rhythm and normal heart sounds  Pulmonary/Chest: Effort normal    Lung sounds revealed some mild expiratory wheeze in the right lower lobe  Otherwise clear   Abdominal: Soft  She exhibits no distension  There is no tenderness  There is no guarding  Musculoskeletal: She exhibits no edema  Lymphadenopathy:     She has no cervical adenopathy  Neurological: She is alert and oriented to person, place, and time  Skin: Skin is warm and dry  No rash noted  Psychiatric: She has a normal mood and affect  Her behavior is normal  Thought content normal        Diagnostics:  I have personally reviewed pertinent films in PACS  CT of chest performed on 7/24/18 without contrast revealed no significant change in a smooth bordered right lower lobe lung nodule  The nodule measured 15 x 13 mm and evaluation by Hounsfield units shows some lipomatous units    No change in the nodule compared to CT of chest done 12/15/17  ESS:  8    Xr Chest 1 View Portable    Result Date: 7/9/2018  Narrative: CHEST INDICATION:   Chest pain  COMPARISON:  Chest x-ray from 4/25/2018 EXAM PERFORMED/VIEWS:  XR CHEST PORTABLE FINDINGS: Cardiomediastinal silhouette appears unremarkable  There is a stable 14 mm nodular density at the right lung base  Lung fields are otherwise clear  No pneumothorax or pleural effusion  Osseous structures appear within normal limits for patient age  Impression: Stable 14 mm nodular density at the right lung base  No new lung findings  Workstation performed: [id number]     Ct Head Without Contrast    Result Date: 7/8/2018  Narrative: CT BRAIN - WITHOUT CONTRAST INDICATION:   Stroke  COMPARISON:  CT 12/22/2015 TECHNIQUE:  CT examination of the brain was performed  In addition to axial images, coronal 2D reformatted images were created and submitted for interpretation  Radiation dose length product (DLP) for this visit:  1195 03 mGy-cm   This examination, like all CT scans performed in the [facility], was performed utilizing techniques to minimize radiation dose exposure, including the use of iterative reconstruction and automated exposure control  IMAGE QUALITY:  Diagnostic  FINDINGS: PARENCHYMA:  No intracranial mass, mass effect or midline shift  No CT signs of acute infarction  No acute parenchymal hemorrhage  Incidental 1 cm pineal mass partially calcified both anteriorly and along the periphery, consistent with intraglandular cyst   Scattered vascular calcifications  VENTRICLES AND EXTRA-AXIAL SPACES:  Normal for the patient's age  VISUALIZED ORBITS AND PARANASAL SINUSES:  Left sphenoid sinus nearly filled with soft tissue  This is new  CALVARIUM AND EXTRACRANIAL SOFT TISSUES:  Normal      Impression: No acute intracranial abnormality   Workstation performed: [id number]     Ct Chest Without Contrast    Result Date: 7/26/2018  Narrative: CT CHEST WITHOUT IV CONTRAST INDICATION:   J43 2: Centrilobular emphysema  Follow-up of pulmonary nodules  COMPARISON:  CTA chest 12/15/2017  PET CT 1/25/2018 PET/CT 1/30/2015  CT chest 4/2/2008  TECHNIQUE: CT examination of the chest was performed without intravenous contrast   Axial, sagittal, and coronal 2D reformatted images were created from the source data and submitted for interpretation  Radiation dose length product (DLP) for this visit:  197 12 mGy-cm   This examination, like all CT scans performed in the [place], was performed utilizing techniques to minimize radiation dose exposure, including the use of iterative  reconstruction and automated exposure control  FINDINGS: LUNGS:  15 x 13 mm right lower lobe nodule unchanged in appearance since 12/15/2017  Measured 12 x 12 mm 1/30/2015 and approximately 6 mm of 4/2/2008  Internal Hounsfield units interrogation of this lesion shows lipomatous elements  PLEURA:  Unremarkable  HEART/GREAT VESSELS:  Unremarkable for patient's age  MEDIASTINUM AND ELMIRA:  Unremarkable  CHEST WALL AND LOWER NECK: 10 mm left thyroid hypodensity  Incidental discovery of one or more thyroid nodule(s) measuring less than 1 5 cm and without suspicious features is noted in this patient who is above 28years old; according to guidelines published in the February 2015 white paper on incidental thyroid nodules in the Journal of the [employer] of Radiology [facility]), no further evaluation is recommended  VISUALIZED STRUCTURES IN THE UPPER ABDOMEN:  Unremarkable  OSSEOUS STRUCTURES:  No acute fracture or destructive osseous lesion  Impression: Stable 15 mm x 13 mm right lower lobe nodule unchanged since 12/15/2017  Workstation performed: [id number]     Ct Sinus Wo Contrast    Result Date: 7/24/2018  Narrative: CT SINUSES INDICATION:   J32 9: Chronic sinusitis, unspecified  COMPARISON:  None  TECHNIQUE:  Axial CT imaging through the sinuses was performed    In addition, sagittal and coronal reformatted images were submitted for interpretation  Radiation dose length product (DLP) for this visit:  244 83 mGy-cm   This examination, like all CT scans performed in the [facility], was performed utilizing techniques to minimize radiation dose exposure, including the use of iterative  reconstruction and automated exposure control  IMAGE QUALITY:  Diagnostic  FINDINGS: FRONTAL SINUSES:  Minimal mucosal thickening of the anterior inferior frontal sinuses  ETHMOID AIR CELLS:  Clear  MAXILLARY SINUSES:  There is a small retention cyst within the medial aspect of the left maxillary sinus anteriorly  SPHENOID SINUSES:  Moderate mucosal thickening of the sphenoid sinus to the left of midline  NASAL SEPTUM AND CAVITY:  Septum is mildly deviated towards the right  Normal nasal cavity  ANTERIOR OSTEOMEATAL COMPLEX:  Patent  OSSEOUS STRUCTURES:  No bony erosion or destruction  Normal cribriform plate and planum sphenoidale  Visualized mastoid temporal bones are clear  The bony walls of the carotid canals are intact  SOFT TISSUES:  Normal      Impression: Mild mucosal thickening of the paranasal sinuses most pronounced within the left paramedian aspect of the sphenoid sinus  No air-fluid levels to suggest acute sinusitis  Mild deviation of the nasal septum towards the right   Workstation performed: [id number]

## 2018-08-04 NOTE — TELEPHONE ENCOUNTER
It seems like it was denied last time it was ordered  Without seeing her and documentation, her insurance is not likely to approve it

## 2018-08-05 PROBLEM — G47.10 HYPERSOMNIA: Status: ACTIVE | Noted: 2018-08-05

## 2018-08-05 PROBLEM — J43.2 CENTRILOBULAR EMPHYSEMA (HCC): Status: ACTIVE | Noted: 2018-01-24

## 2018-08-05 PROBLEM — F17.210 CIGARETTE NICOTINE DEPENDENCE WITHOUT COMPLICATION: Status: ACTIVE | Noted: 2018-08-05

## 2018-08-06 NOTE — ASSESSMENT & PLAN NOTE
I reviewed recent CT scan of the chest without contrast done July 24, 2018 with Jerry Ruggiero  There is a 15 x 13 mm smooth bordered right lower lobe lung nodule which by Hounsfield units shows some lipomatous element  No significant change compared to CT of chest done December 15, 2017  This nodule was initially detected on a CT scan of the abdomen in May 2016 and measured 13 x 10 mm then  Prior PET CT scan done January 25, 2018 showed is nodule did have only borderline SUV uptake of 2 4  Thus far there has been no significant growth in his nodule to suggest malignancy however given her persistent smoking 1 pack per day and significant smoking history, I will order a follow-up CT scan of the chest in 6 months  This will be done without contrast   I also gave her the option of his CT-guided needle biopsy but she did not want to have that done at this time

## 2018-08-06 NOTE — ASSESSMENT & PLAN NOTE
Susan Rosales complains of daytime fatigue oand snoring  [place] sleepiness score is 8 and Mallampati score is 2  I did discuss with her what MARY is and how it is treated  She wanted to pursue a diagnostic sleep study  I ordered a home study

## 2018-08-06 NOTE — ASSESSMENT & PLAN NOTE
Nelson Gotti is still smoking 1 pack of cigarettes per day  I discussed smoking cessation with her  She was agreeable to a trial of a Nicotrol inhaler  Spirometry last visit was john and she is not needing any inhaler at this time  Her FEV1 was 1 84L or 81% of predicted

## 2018-09-24 ENCOUNTER — DOCUMENTATION (OUTPATIENT)
Dept: NEUROLOGY | Facility: CLINIC | Age: 63
End: 2018-09-24

## 2018-09-24 NOTE — PROGRESS NOTES
I have not seen her in over a year  MRI brain is useless for vagus nerve  It's rather common autonomic problem  She would need to make an appointment when available or try at our [place] neurology office for sooner

## 2018-09-24 NOTE — PROGRESS NOTES
The patient stopped in this morning and stated that she had a heart attack last Thursday  She was admitted at [facility] (seen by [facility]) and was found to have a blockage  However, she is stating that they told her that her vagus nerve was stimulated which caused her BP to drop and that is the cause of the heart attack  She is seeing the cardiac surgeon this Friday and will be having surgery next week  She wants to see you ASAP because she wants the "vagus nerve" problem addressed before undergoing surgery  She would like her brain scanned as well         Phone [phone]

## 2018-09-26 ENCOUNTER — OFFICE VISIT (OUTPATIENT)
Dept: NEUROLOGY | Facility: CLINIC | Age: 63
End: 2018-09-26
Payer: COMMERCIAL

## 2018-09-26 VITALS
BODY MASS INDEX: 28.52 KG/M2 | SYSTOLIC BLOOD PRESSURE: 118 MMHG | HEIGHT: 62 IN | DIASTOLIC BLOOD PRESSURE: 72 MMHG | HEART RATE: 102 BPM | WEIGHT: 155 LBS

## 2018-09-26 DIAGNOSIS — Z82.0 FAMILY HISTORY OF MULTIPLE SCLEROSIS: ICD-10-CM

## 2018-09-26 DIAGNOSIS — G52.2 VAGAL NERVE SENSITIVITY: ICD-10-CM

## 2018-09-26 DIAGNOSIS — G90.9 AUTONOMIC DYSFUNCTION: ICD-10-CM

## 2018-09-26 DIAGNOSIS — I25.119 CORONARY ARTERY DISEASE INVOLVING NATIVE HEART WITH ANGINA PECTORIS, UNSPECIFIED VESSEL OR LESION TYPE (HCC): ICD-10-CM

## 2018-09-26 DIAGNOSIS — R51.9 FREQUENT HEADACHES: ICD-10-CM

## 2018-09-26 DIAGNOSIS — Z86.69 HISTORY OF MIGRAINE: ICD-10-CM

## 2018-09-26 DIAGNOSIS — R20.0 LEFT FACIAL NUMBNESS: Primary | ICD-10-CM

## 2018-09-26 PROCEDURE — 99215 OFFICE O/P EST HI 40 MIN: CPT | Performed by: PSYCHIATRY & NEUROLOGY

## 2018-09-26 RX ORDER — ATORVASTATIN CALCIUM 10 MG/1
20 TABLET, FILM COATED ORAL
Refills: 0 | COMMUNITY
Start: 2018-09-21 | End: 2021-02-22

## 2018-09-26 NOTE — PROGRESS NOTES
Return NeuroOutpatient Note        [facility]  [id number]  20 y o   1955       Neurologic Problem (Recent heart attack- Wants Neuro clearance)        History obtained from:  Patient     HPI/Subjective:    Ms Brooklynn Schmidt is a 65 yo F with no significant PMH presents as follow up  She was last seen in July 2017  At the time she had presented with left facial numbness and migraines  At the time we had ordered MRI brain MS protocol to r/o demyelinating lesions  It was even approved but patient didn't want to get contrast so she felt she didn't need it and didn't have it done  Today she comes with different complaint  She recently had ear bleed  She had through evaluation by ENT and no clear pathology was found  She still gets left facial numbness and it is now worse  She will get occasional hand paresthesia and lower back pain related paresthesia in lower extremity  She also reports headache in left frontal, right neck region  She gets associated nausea  Reports mild light and noise sensitivity  A month ago, she was found to have heart attack and is about to have bypass surgery in future  She is told that her vagus nerve was stimulated and had drop in bp and possible cad  We do not have records from [facility] to confirm any of this  She says she had horrific abdominal pain before onset of her sxs  Her son has optic neuritis         Past Medical History:   Diagnosis Date    Acid reflux     Anxiety     Arthritis     Cancer (HCC)     skin on chin    Chronic kidney disease     (R) kidney smaller than (L),  kidney stones    Chronic UTI (urinary tract infection)     COPD (chronic obstructive pulmonary disease) (HCC)     mild, recent dx    Depression     Epidermal cyst of face     last assessed 10-    Heart attack ([facility] [address] ) 09/20/2018    [place] (hard of hearing)     deaf (L) ear, decreased hearing (R) ear    Hyperlipemia     diet controlled    Hyperlipidemia     IBS (irritable bowel syndrome)  Malignant neoplasm of skin     Meniere disease     Numbness and tingling of left side of face     Uterine leiomyoma      Social History     Social History    Marital status: Single     Spouse name: N/A    Number of children: N/A    Years of education: N/A     Occupational History    Not on file       Social History Main Topics    Smoking status: Current Every Day Smoker     Packs/day: 1 00     Years: 43 00     Types: Cigarettes    Smokeless tobacco: Never Used      Comment: 45+ years per allscripts    Alcohol use No    Drug use: No    Sexual activity: Not on file     Other Topics Concern    Not on file     Social History Narrative    Always uses [employer]         Family History   Problem Relation Age of Onset    Heart disease Mother         cardiac disorder    Lung cancer Mother     COPD Mother     Colonic polyp Sister     Thyroid cancer Sister     Osteoporosis Family     Colon cancer Family     Heart disease Family         cardiac disorder    Heart disease Family         cardiac disorder    Stroke Family     Arthritis Family     Colon cancer Family     Lung cancer Family     Cancer Family         anterior wall of urinanry bladder    Breast cancer Family     Other Family         brain tumor    Breast cancer Cousin     Brain cancer Father     Skin cancer Brother     Skin cancer Brother     Diabetes Son     Neuropathy Son     No Known Problems Son      Allergies   Allergen Reactions    Axid [Nizatidine] Other (See Comments)     Severe chest pain  Severe chest pain    Erythromycin Hives and Itching    Penicillins Hives and Itching    Wellbutrin [Bupropion] Other (See Comments)     Elevated  / 110    Ciprofloxacin Hcl Hives    Fish-Derived Products GI Intolerance     Tuna fish & all shell fish    Other Hives     allegic to all cillans and all mycins    Ampicillin     Benadryl [Diphenhydramine Hcl] Other (See Comments)     unknown    Ciprofloxacin     Clindamycin  Doxycycline     Lovastatin      Muscle aches    Nickel Other (See Comments)     Throat swelling/itching    Shellfish-Derived Products GI Intolerance     Pt unsure     Current Outpatient Prescriptions on File Prior to Visit   Medication Sig Dispense Refill    aspirin 81 mg chewable tablet Chew 81 mg daily      nicotine (NICOTROL) 10 MG inhaler Inhale 1 puff as needed for smoking cessation Use 1 cartridge per hour up to 10 a day as needed 168 each 0    pantoprazole (PROTONIX) 40 mg tablet Take 1 tablet (40 mg total) by mouth daily 30 tablet 2    [DISCONTINUED] prochlorperazine (COMPAZINE) 10 mg tablet Take 0 5 tablets (5 mg total) by mouth every 6 (six) hours as needed for nausea or vomiting Or headache 10 tablet 0    [DISCONTINUED] rosuvastatin (CRESTOR) 5 mg tablet        No current facility-administered medications on file prior to visit  Review of Systems   Refer to positive review of systems in HPI  Constitutional- No fever  Eyes- No visual change  ENT- Hearing normal  CV- No chest pain  Resp- No Shortness of breath  GI- No diarrhea  - Bladder normal  MS- No Arthritis   Skin- No rash  Psych- No depression  Endo- No DM  Heme- No nodes    Vitals:    09/26/18 1450   BP: 118/72   BP Location: Left arm   Patient Position: Sitting   Pulse: 102   Weight: 70 3 kg (155 lb)   Height: 5' 2" (1 575 m)       PHYSICAL EXAM:  Appearance: No Acute Distress  Ophthalmoscopic: Disc Flat, Normal fundus  Mental status:  Orientation: Awake, Alert, and Orientedx3  Memory: Registation 3/3 Recall 3/3  Attention: normal  Knowledge: good  Language: No aphasia  Speech: No dysarthria  Cranial Nerves:  2 No Visual Defect on Confrontation, Pupils round, equal, reactive to light  3,4,6 Extraocular Movements Intact, no nystagmus  5 decreased on left face     7 No facial asymmetry  8 Intact hearing  9,10 Palate symmetric, normal gag  11 Good shoulder shrug  12 Tongue Midline  Gait: Stable  Coordination: No ataxia with finger to nose testing, and heel to shin  Sensory: Intact, Symmetric to pinprick, light touch, vibration, and joint position  Muscle Tone: Normal              Muscle exam:  Arm Right Left Leg Right Left   Deltoid 5/5 5/5 Iliopsoas 5/5 5/5   Biceps 5/5 5/5 Quads 5/5 5/5   Triceps 5/5 5/5 Hamstrings 5/5 5/5   Wrist Extension 5/5 5/5 Ankle Dorsi Flexion 5/5 5/5   Wrist Flexion 5/5 5/5 Ankle Plantar Flexion 5/5 5/5   Interossei 5/5 5/5 Ankle Eversion 5/5 5/5   APB 5/5 5/5 Ankle Inversion 5/5 5/5       Reflexes   RJ BJ TJ KJ AJ Plantars Lara's   Right 2+ 2+ 2+ 2+ 2+ Downgoing Not present   Left 2+ 2+ 2+ 2+ 2+ Downgoing Not present     Personal review of  Labs:                  Diagnoses and all orders for this visit:        1  Left facial numbness  MRI brain MS wo and w contrast    MRA carotids wo and w contrast    MRA head w wo contrast   2  Family history of multiple sclerosis  MRI brain MS wo and w contrast   3  History of migraine     4  Coronary artery disease involving native heart with angina pectoris, unspecified vessel or lesion type ([facility] [address] )     5  Autonomic dysfunction  MRA carotids wo and w contrast    MRA head w wo contrast   6  Vagal nerve sensitivity  MRA carotids wo and w contrast    MRA head w wo contrast   7  Frequent headaches  MRA carotids wo and w contrast    MRA head w wo contrast       Patient reports cluster of symptoms that is difficult to pin point to one cause  It's not clear why she had an episode of vagal nerve over sensitivity  We do not have dedicated imaging for it  At the most we can get is MRA intra/extracranial to look for vascular abnormality, possible vagal nerve, carotid stenosis  She still has pending MRI brain for MS  Counseling Documentation:  The patient and/or patient's family were  counseled regarding diagnostic results  Instructions for management,risk factor reductions,prognosis of disease were discussed   Patient and family were educated regarding impressions,risks and benefits of treatment options,importance of compliance with treatment        Total time of encounter:  45 min  More than 50% of the time was used in counseling and/or coordination of care  Extent of counseling and/or coordination of care        MD Rajan Reddy [facility]  [address]  [address]  [phone]

## 2018-09-28 ENCOUNTER — OFFICE VISIT (OUTPATIENT)
Dept: GASTROENTEROLOGY | Facility: CLINIC | Age: 63
End: 2018-09-28
Payer: COMMERCIAL

## 2018-09-28 VITALS
HEIGHT: 62 IN | DIASTOLIC BLOOD PRESSURE: 87 MMHG | TEMPERATURE: 98.3 F | BODY MASS INDEX: 28.85 KG/M2 | SYSTOLIC BLOOD PRESSURE: 142 MMHG | WEIGHT: 156.8 LBS | HEART RATE: 68 BPM

## 2018-09-28 DIAGNOSIS — R19.7 DIARRHEA, UNSPECIFIED TYPE: Primary | ICD-10-CM

## 2018-09-28 PROCEDURE — 99214 OFFICE O/P EST MOD 30 MIN: CPT | Performed by: INTERNAL MEDICINE

## 2018-09-28 RX ORDER — EZETIMIBE 10 MG/1
10 TABLET ORAL DAILY
COMMUNITY

## 2018-09-28 RX ORDER — DICYCLOMINE HCL 20 MG
20 TABLET ORAL 3 TIMES DAILY PRN
Qty: 30 TABLET | Refills: 2 | Status: SHIPPED | OUTPATIENT
Start: 2018-09-28 | End: 2019-11-11

## 2018-09-28 RX ORDER — SACCHAROMYCES BOULARDII 250 MG
250 CAPSULE ORAL 2 TIMES DAILY
Qty: 60 CAPSULE | Refills: 6 | Status: SHIPPED | OUTPATIENT
Start: 2018-09-28 | End: 2019-11-11

## 2018-09-28 NOTE — PROGRESS NOTES
Follow-up Note -  [facility]  [facility] 1955 female         Reason:  Diarrhea and constipation    HPI:  Ms Vanessa Hernandez came in because diarrhea for about a month  She describes as loose watery bowel movements about 3 times daily without any blood or mucus in the stool  She is complaining about cramping pain in the lower abdomen associated with some back pain radiating down her legs  She had an episode of near syncope last week when she had abdominal pain and chest pain  She was hospitalized and was noted to have acute MI and cardiac catheterization showed CAD  She is being evaluated by Cardiology for further treatment  She had colonoscopy couple of years ago which showed normal mucosa but the 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 but she never to the treatment as prescribed  She reports having good bowel movements until a month ago when she was started diarrhea  But now she reports being constipated in the past 3 days  REVIEW OF SYSTEMS: Review of Systems   Constitutional: Negative for activity change, appetite change, chills, diaphoresis, fatigue, fever and unexpected weight change  HENT: Negative for ear discharge, ear pain, facial swelling, hearing loss, nosebleeds, sore throat, tinnitus and voice change  Eyes: Negative for pain, discharge, redness, itching and visual disturbance  Respiratory: Negative for apnea, cough, chest tightness, shortness of breath and wheezing  Cardiovascular: Negative for chest pain and palpitations  Gastrointestinal:        As noted in HPI   Endocrine: Negative for cold intolerance, heat intolerance and polyuria  Genitourinary: Negative for difficulty urinating, dysuria, flank pain, hematuria and urgency  Musculoskeletal: Negative for arthralgias, back pain, gait problem, joint swelling and myalgias  Skin: Negative for rash and wound     Neurological: Negative for dizziness, tremors, seizures, speech difficulty, light-headedness, numbness and headaches  Hematological: Negative for adenopathy  Does not bruise/bleed easily  Psychiatric/Behavioral: Negative for agitation, behavioral problems and confusion  The patient is not nervous/anxious  Past Medical History:   Diagnosis Date    Acid reflux     Anxiety     Arthritis     Cancer ([facility] [address] )     skin on chin    Chronic kidney disease     (R) kidney smaller than (L),  kidney stones    Chronic UTI (urinary tract infection)     Colitis     microscopic    COPD (chronic obstructive pulmonary disease) (HCC)     mild, recent dx    Depression     Epidermal cyst of face     last assessed 10-    Heart attack ([facility] [address] ) 09/20/2018    [place] (hard of hearing[place]     deaf (L) ear, decreased hearing (R) ear    Hyperlipemia     diet controlled    Hyperlipidemia     IBS (irritable bowel syndrome)     Malignant neoplasm of skin     Meniere disease     Numbness and tingling of left side of face     Uterine leiomyoma       Past Surgical History:   Procedure Laterality Date    COLONOSCOPY N/A 11/4/2016    Procedure: COLONOSCOPY;  Surgeon: Yogi Brown MD;  [facility]; Service:     ERCP      ESOPHAGOGASTRODUODENOSCOPY N/A 11/4/2016    Procedure: ESOPHAGOGASTRODUODENOSCOPY (EGD); Surgeon: Yogi Brown MD;  Location: [facility] GI LAB; Service:     EYE SURGERY       [address] CATARACT EXTRACAP,INSERT LENS Left 3/7/2016    Procedure: EXTRACTION EXTRACAPSULAR CATARACT PHACO INTRAOCULAR LENS (IOL); Surgeon: Marce Gaming MD;  Location: [facility] MAIN OR;  Service: Ophthalmology    SKIN CANCER EXCISION      excision skin cancer on chin     Social History     Social History    Marital status: Single     Spouse name: N/A    Number of children: N/A    Years of education: N/A     Occupational History    Not on file       Social History Main Topics    Smoking status: Current Every Day Smoker     Packs/day: 1 00     Years: 43 00     Types: Cigarettes    Smokeless tobacco: Never Used      Comment: 45+ years per allscripts    Alcohol use No    Drug use: No    Sexual activity: Not on file     Other Topics Concern    Not on file     Social History Narrative    Always uses [employer]         Family History   Problem Relation Age of Onset    Heart disease Mother         cardiac disorder    Lung cancer Mother     COPD Mother     Colonic polyp Sister     Thyroid cancer Sister     Osteoporosis Family     Colon cancer Family     Heart disease Family         cardiac disorder    Heart disease Family         cardiac disorder    Stroke Family     Arthritis Family     Colon cancer Family     Lung cancer Family     Cancer Family         anterior wall of urinanry bladder    Breast cancer Family     Other Family         brain tumor    Breast cancer Cousin     Brain cancer Father     Skin cancer Brother     Skin cancer Brother     Diabetes Son     Neuropathy Son     No Known Problems Son      Axid [nizatidine]; Erythromycin; Penicillins; Wellbutrin [bupropion]; Ciprofloxacin hcl; Fish-derived products; Other; Ampicillin; Benadryl [diphenhydramine hcl]; Ciprofloxacin; Clindamycin; Doxycycline;  Lovastatin; Nickel; and Shellfish-derived products  Current Outpatient Prescriptions   Medication Sig Dispense Refill    aspirin 81 mg chewable tablet Chew 81 mg daily      atorvastatin (LIPITOR) 10 mg tablet Take 10 mg by mouth daily at bedtime  0    ezetimibe (ZETIA) 10 mg tablet Take 10 mg by mouth daily      metoprolol tartrate (LOPRESSOR) 25 mg tablet Take 12 5 mg by mouth 2 (two) times a day  0    nicotine (NICOTROL) 10 MG inhaler Inhale 1 puff as needed for smoking cessation Use 1 cartridge per hour up to 10 a day as needed 168 each 0    pantoprazole (PROTONIX) 40 mg tablet Take 1 tablet (40 mg total) by mouth daily 30 tablet 2    dicyclomine (BENTYL) 20 mg tablet Take 1 tablet (20 mg total) by mouth 3 (three) times a day as needed (Abdominal pain) 30 tablet 2    saccharomyces boulardii (FLORASTOR) 250 mg capsule Take 1 capsule (250 mg total) by mouth 2 (two) times a day 60 capsule 6     No current facility-administered medications for this visit  Blood pressure 142/87, pulse 68, temperature 98 3 °F (36 8 °C), temperature source Tympanic, height 5' 2" (1 575 m), weight 71 1 kg (156 lb 12 8 oz), not currently breastfeeding  PHYSICAL EXAM: Physical Exam   Constitutional: She is oriented to person, place, and time  She appears well-developed and well-nourished  HENT:   Head: Normocephalic and atraumatic  Nose: Nose normal    Mouth/Throat: Oropharynx is clear and moist    Eyes: Conjunctivae are normal  Right eye exhibits no discharge  Left eye exhibits no discharge  No scleral icterus  Neck: Neck supple  No JVD present  No tracheal deviation present  No thyromegaly present  Cardiovascular: Normal rate, regular rhythm and normal heart sounds  Exam reveals no gallop and no friction rub  No murmur heard  Pulmonary/Chest: Effort normal and breath sounds normal  No respiratory distress  She has no wheezes  She has no rales  She exhibits no tenderness  Abdominal: Soft  Bowel sounds are normal  She exhibits no distension and no mass  There is no tenderness  There is no rebound and no guarding  No hernia  Musculoskeletal: She exhibits no edema, tenderness or deformity  Lymphadenopathy:     She has no cervical adenopathy  Neurological: She is alert and oriented to person, place, and time  Skin: Skin is warm and dry  No rash noted  No erythema  Psychiatric: She has a normal mood and affect   Her behavior is normal  Thought content normal         Lab Results   Component Value Date    WBC 8 97 07/08/2018    HGB 14 1 07/08/2018    HCT 43 2 07/08/2018    MCV 88 07/08/2018     07/08/2018     Lab Results   Component Value Date    GLUCOSE 86 01/22/2018    CALCIUM 9 2 07/08/2018     07/08/2018    K 3 4 (L) 07/08/2018    CO2 25 07/08/2018     07/08/2018    BUN 9 07/08/2018    CREATININE 0 89 07/08/2018     Lab Results   Component Value Date    ALT 24 07/08/2018    AST 16 07/08/2018    ALKPHOS 104 07/08/2018    BILITOT 0 2 01/22/2018     Lab Results   Component Value Date    INR 0 96 07/08/2018    INR 0 98 04/25/2018    INR 1 0 01/22/2018    PROTIME 10 1 07/08/2018    PROTIME 10 3 04/25/2018    PROTIME 10 3 01/22/2018       Ct Chest Without Contrast    Result Date: 7/26/2018  Impression: Stable 15 mm x 13 mm right lower lobe nodule unchanged since 12/15/2017  Workstation performed: [id number]     Ct Sinus Wo Contrast    Result Date: 7/24/2018  Impression: Mild mucosal thickening of the paranasal sinuses most pronounced within the left paramedian aspect of the sphenoid sinus  No air-fluid levels to suggest acute sinusitis  Mild deviation of the nasal septum towards the right  Workstation performed: [id number]       ASSESSMENT & PLAN:    Diarrhea  Patient had diarrhea for about a month but reports having constipation in the past couple of days  Consider microscopic colitis as it was proved on biopsies couple of years ago    Should rule out infection     -check the stool studies including C diff, cultures    -discussed with her about colonoscopy but she was told by her cardiologist not to have any procedures at this time     -if the stool studies are negative and if she continues with diarrhea we can consider to put her on Entocort for possible microscopic colitis     -advised her to take the probiotics regularly

## 2018-10-12 LAB
C DIFF TOX GENS STL QL NAA+PROBE: NEGATIVE
CAMPY SP DNA.DIARRHEA STL QL NAA+PROBE: NEGATIVE
EC STX1 + STX2 GENES STL NAA+PROBE: NEGATIVE
ELASTASE PANC STL-MCNT: >500 UG ELAST./G
Lab: NORMAL
Lab: NORMAL
O+P STL CONC: NORMAL
SALMONELLA DNA SPEC QL NAA+PROBE: NEGATIVE
SHIGELLA DNA SPEC QL NAA+PROBE: NEGATIVE
WBC SPEC QL GRAM STN: NORMAL

## 2018-10-24 ENCOUNTER — TELEPHONE (OUTPATIENT)
Dept: GASTROENTEROLOGY | Facility: CLINIC | Age: 63
End: 2018-10-24

## 2018-10-24 NOTE — TELEPHONE ENCOUNTER
----- Message from Adali Montague MD sent at 10/23/2018  5:57 PM EDT -----  All the stool studies came back as negative     I called patient in the left a message to call back    If patient continues to have diarrhea we can give a course of Entocort

## 2018-10-25 NOTE — TELEPHONE ENCOUNTER
Pt returning missed call for results   Pt requesting to speak to a nurse also, didn't want to list the nature of the request

## 2018-10-25 NOTE — TELEPHONE ENCOUNTER
Patient called to report "syncope" when having a bm that starts with lower body "numbness and tingling" and states has been found on floor and taken to hospital   She states findings included "heart attack"  I saw note from cardiologist documenting  NSTEMI in September, unable to offer stent due to nickel allergy and chose conservative measures vs heart surgery  She has quit smoking and is going on an antidepressant but is looking to you for tmt plan to control her syncope during bm  She denies diarrhea and straining but did initially report constipation as we were talking  She is staying with her son due to this issue  She is not taking bentyl or florastor(due to side effects she had  Thanks

## 2018-10-30 NOTE — TELEPHONE ENCOUNTER
I called patient to f/u and she stated she was still waiting on a call back with treatment plan for reported syncopal episodes during bowel movement and was considering changing providers due to delay in getting results and recommendations  I spoke to Dr Adrián Matta who called patient and reached vm  He left a detailed message regarding results as he had been out of [place] weeks but had tried calling her, reached her vm and left a message to call back  (on 10/23)  He recommended she call back to address symptoms/concerns and schedule office visit ASAP  , Jillian Rockwell, aware

## 2019-01-18 ENCOUNTER — TRANSCRIBE ORDERS (OUTPATIENT)
Dept: ADMINISTRATIVE | Facility: HOSPITAL | Age: 64
End: 2019-01-18

## 2019-01-18 DIAGNOSIS — Z12.39 SCREENING BREAST EXAMINATION: ICD-10-CM

## 2019-01-18 DIAGNOSIS — R10.9 ACUTE FLANK PAIN: Primary | ICD-10-CM

## 2019-01-24 ENCOUNTER — HOSPITAL ENCOUNTER (OUTPATIENT)
Dept: RADIOLOGY | Facility: HOSPITAL | Age: 64
Discharge: HOME | End: 2019-01-24
Attending: INTERNAL MEDICINE
Payer: COMMERCIAL

## 2019-01-24 DIAGNOSIS — R91.1 LUNG NODULE: ICD-10-CM

## 2019-01-24 DIAGNOSIS — R10.9 ACUTE FLANK PAIN: ICD-10-CM

## 2019-01-24 PROCEDURE — 71250 CT THORAX DX C-: CPT

## 2019-01-24 PROCEDURE — 74176 CT ABD & PELVIS W/O CONTRAST: CPT

## 2019-01-28 ENCOUNTER — HOSPITAL ENCOUNTER (OUTPATIENT)
Dept: RADIOLOGY | Facility: HOSPITAL | Age: 64
Discharge: HOME | End: 2019-01-28
Payer: COMMERCIAL

## 2019-01-28 VITALS — WEIGHT: 154 LBS | HEIGHT: 62 IN | BODY MASS INDEX: 28.34 KG/M2

## 2019-01-28 DIAGNOSIS — Z12.39 SCREENING BREAST EXAMINATION: ICD-10-CM

## 2019-01-28 PROCEDURE — 77063 BREAST TOMOSYNTHESIS BI: CPT

## 2019-01-28 PROCEDURE — 77067 SCR MAMMO BI INCL CAD: CPT

## 2019-02-04 ENCOUNTER — OFFICE VISIT (OUTPATIENT)
Dept: PULMONOLOGY | Facility: MEDICAL CENTER | Age: 64
End: 2019-02-04
Payer: COMMERCIAL

## 2019-02-04 VITALS
SYSTOLIC BLOOD PRESSURE: 126 MMHG | WEIGHT: 154 LBS | RESPIRATION RATE: 12 BRPM | BODY MASS INDEX: 28.34 KG/M2 | OXYGEN SATURATION: 98 % | TEMPERATURE: 98.1 F | HEART RATE: 75 BPM | DIASTOLIC BLOOD PRESSURE: 82 MMHG | HEIGHT: 62 IN

## 2019-02-04 DIAGNOSIS — R91.1 LUNG NODULE: ICD-10-CM

## 2019-02-04 DIAGNOSIS — F17.210 NICOTINE DEPENDENCE, CIGARETTES, UNCOMPLICATED: ICD-10-CM

## 2019-02-04 DIAGNOSIS — J43.2 CENTRILOBULAR EMPHYSEMA (HCC): ICD-10-CM

## 2019-02-04 DIAGNOSIS — I25.10 CORONARY ARTERY DISEASE INVOLVING NATIVE CORONARY ARTERY OF NATIVE HEART WITHOUT ANGINA PECTORIS: ICD-10-CM

## 2019-02-04 DIAGNOSIS — F17.210 CIGARETTE NICOTINE DEPENDENCE WITHOUT COMPLICATION: ICD-10-CM

## 2019-02-04 DIAGNOSIS — R91.8 LUNG NODULES: Primary | ICD-10-CM

## 2019-02-04 PROCEDURE — 99214 OFFICE O/P EST MOD 30 MIN: CPT | Performed by: INTERNAL MEDICINE

## 2019-02-04 NOTE — ASSESSMENT & PLAN NOTE
Ariane Melo was hospitalized in September 2018 when she was having some loose stool in upset stomach and left chest pain  She had a near syncopal episode then  She had a cardiac catheterization there at [facility] in September and was found to have severe coronary disease involving the LAD and diagonal   She states she had about 80% blockage  However she had a allergy to nickel with skin rash she did not want to have any kind stent placed  Her cardiologist Dr Jacob Lee did check with this stent  and found that all metal stents to have nickel component  She is going to have a specialized allergy patch with nickel and at to see if she would be a possible candidate for this type of metal stent  This is being done under the supervision of an allergist a company that makes the stents provides his patch  Otherwise if she had any chest pain and she could not have stenting she would consider for possible evaluation for CABG surgery  Present time she is not having any chest pain  Her cardiac catheterization was done 09/21/2018  She will be following up with Dr Jacob Lee after she has this nickel patch test done

## 2019-02-04 NOTE — ASSESSMENT & PLAN NOTE
Mild COPD which is stable  She did have complete PFT done in April 2018 which showed very minimal decrease in FEV1 to 1 77 L or 79% of predicted  She is not using any inhaler therapy does not feel she needs any at this time  I did encourage her to quit smoking  She is still smoking 1/2 pack of cigarettes per day

## 2019-02-04 NOTE — ASSESSMENT & PLAN NOTE
She is still smoking half pack of cigarettes per day  I did give her a prescription for a Nicotrol inhaler can use this work for previously

## 2019-02-04 NOTE — ASSESSMENT & PLAN NOTE
CT scan of the chest done June 24, 2018 was reviewed with patient per the right lower lobe 15 x 13 mm smooth bordered nodule remains unchanged compared to prior scans and may be a hamartoma  There are some small bilateral ground-glass nodules bilaterally with some slight increase in the left upper lobe area inflammation up to 8 mm    Will order follow-up CT scan of the chest without contrast to be done in 6 months

## 2019-02-04 NOTE — PROGRESS NOTES
Assessment/Plan:     Problem List Items Addressed This Visit        Respiratory    Centrilobular emphysema ([address] )     Mild COPD which is stable  She did have complete PFT done in April 2018 which showed very minimal decrease in FEV1 to 1 77 L or 79% of predicted  She is not using any inhaler therapy does not feel she needs any at this time  I did encourage her to quit smoking  She is still smoking 1/2 pack of cigarettes per day  Cardiovascular and Mediastinum    Coronary artery disease involving native coronary artery of native heart without angina pectoris     Alda Mujica was hospitalized in September 2018 when she was having some loose stool in upset stomach and left chest pain  She had a near syncopal episode then  She had a cardiac catheterization there at [facility] in September and was found to have severe coronary disease involving the LAD and diagonal   She states she had about 80% blockage  However she had a allergy to nickel with skin rash she did not want to have any kind stent placed  Her cardiologist Dr Rivera Son did check with this stent  and found that all metal stents to have nickel component  She is going to have a specialized allergy patch with nickel and at to see if she would be a possible candidate for this type of metal stent  This is being done under the supervision of an allergist a company that makes the stents provides his patch  Otherwise if she had any chest pain and she could not have stenting she would consider for possible evaluation for CABG surgery  Present time she is not having any chest pain  Her cardiac catheterization was done 09/21/2018  She will be following up with Dr Miguel Mckay after she has this nickel patch test done              Other    Lung nodules - Primary     CT scan of the chest done June 24, 2018 was reviewed with patient per the right lower lobe 15 x 13 mm smooth bordered nodule remains unchanged compared to prior scans and may be a hamartoma  There are some small bilateral ground-glass nodules bilaterally with some slight increase in the left upper lobe area inflammation up to 8 mm  Will order follow-up CT scan of the chest without contrast to be done in 6 months          Relevant Orders    CT chest without contrast    Cigarette nicotine dependence without complication     She is still smoking half pack of cigarettes per day  I did give her a prescription for a Nicotrol inhaler can use this work for previously  Relevant Medications    nicotine (NICOTROL) 10 MG inhaler      Other Visit Diagnoses     Nicotine dependence, cigarettes, uncomplicated        Relevant Medications    nicotine (NICOTROL) 10 MG inhaler            Return in about 7 months (around 9/10/2019)  All questions are answered to the patient's satisfaction and understanding  She verbalizes understanding  She is encouraged to call with any further questions or concerns  Portions of the record may have been created with voice recognition software  Occasional wrong word or "sound a like" substitutions may have occurred due to the inherent limitations of voice recognition software  Read the chart carefully and recognize, using context, where substitutions have occurred  Electronically Signed by Chanel Espinoza DO    ______________________________________________________________________    Chief Complaint:   Chief Complaint   Patient presents with    Nasal Congestion     pt states that she has also been  bronchial issues/ slight fever/ no cough/fatigue       Patient ID: Alexandria Goldstein is a 66 y o  y o  female has a past medical history of Acid reflux; Anxiety; Arthritis; Cancer ([facility] [address] ); Chronic kidney disease; Chronic UTI (urinary tract infection); Colitis; COPD (chronic obstructive pulmonary disease) ([address] ); Depression; Epidermal cyst of face; Heart attack ([address] ) (09/20/2018); [place] (hard of hearing); Hyperlipemia;  Hyperlipidemia; IBS (irritable bowel syndrome); Malignant neoplasm of skin; Meniere disease; Numbness and tingling of left side of face; and Uterine leiomyoma  2/4/2019  Patient presents today for follow-up visit  HPI    Nikolay Balderas has history of a right lower lobe lung nodule and also has very mild COPD  She did have a follow up CT of her chest performed on 1/24/19 without contrast revealed a stable 15 x 13 mm smooth bordered nodule in the right lower lobe no change compared to prior CT scans  There there are some smaller mostly ground-glass nodules bilaterally which are likely inflammatory  There has been no change in this nodule going back to CT scan December 15,  2017  She did have a PET CT scan done January 25, 2018 showed the nodule to be same in size with only minimal hypermetabolic activity with SUV of 2 4  Nikolay Balderas is smoking half a pack of cigarettes per day  She has been using the nicotine patch periodically and nicotine inhaler  She is working on trying to reduce and quit smoking  She states she had episode of syncope and she had some type of a GI illness and September 2018  She was taken to [facility] where she underwent cardiac catheterization  She had an acute MI and was found to have 2 blockages of her coronary arteries  When she states was in the left anterior descending artery and another was in the 2nd branch of the left coronary artery  She states she has a metal allergy  She is alert she to nickel  She states she has seen an allergist and is going to have a special allergy patch placed at uses same metal that his in a cardiac stent that she may have placed per her cardiologist is Dr Chano Howell  She is not having chest pain  She also sees a cardiologist at of phle to [place]      She is not having any shortness of breath with her usual activity  She does have periodic smoker's cough  She does have some nasal congestion from recent URI  She is not have any fever or chills    She states she did not use any inhaler as when she takes albuterol she gets headache  She only has occasional wheeze  She does have history of daytime fatigue but did not want to pursue a home sleep study at this time  Review of Systems   Constitutional: Negative for activity change, appetite change and fever  HENT: Positive for congestion  Eyes: Negative for redness  Respiratory: Negative for shortness of breath  Has occasional cough   Cardiovascular: Negative for chest pain and leg swelling  Gastrointestinal: Negative for abdominal distention and abdominal pain  Endocrine: Negative for polydipsia and polyphagia  Neurological: Negative for light-headedness  Smoking history: She reports that she has been smoking Cigarettes  She has a 21 50 pack-year smoking history  She has never used smokeless tobacco     The following portions of the patient's history were reviewed and updated as appropriate: allergies, current medications, past family history, past medical history, past social history, past surgical history and problem list       There is no immunization history on file for this patient    Current Outpatient Prescriptions   Medication Sig Dispense Refill    aspirin 81 mg chewable tablet Chew 81 mg daily      atorvastatin (LIPITOR) 10 mg tablet Take 10 mg by mouth daily at bedtime  0    dicyclomine (BENTYL) 20 mg tablet Take 1 tablet (20 mg total) by mouth 3 (three) times a day as needed (Abdominal pain) 30 tablet 2    ezetimibe (ZETIA) 10 mg tablet Take 10 mg by mouth daily      metoprolol tartrate (LOPRESSOR) 25 mg tablet Take 12 5 mg by mouth 2 (two) times a day  0    nicotine (NICOTROL) 10 MG inhaler Inhale 1 puff as needed for smoking cessation Use 1 cartridge per hour up to 10 a day as needed 168 each 3    pantoprazole (PROTONIX) 40 mg tablet Take 1 tablet (40 mg total) by mouth daily 30 tablet 2    saccharomyces boulardii (FLORASTOR) 250 mg capsule Take 1 capsule (250 mg total) by mouth 2 (two) times a day 60 capsule 6     No current facility-administered medications for this visit  Allergies: Axid [nizatidine]; Erythromycin; Penicillins; Wellbutrin [bupropion]; Ciprofloxacin hcl; Fish-derived products; Other; Ampicillin; Benadryl [diphenhydramine hcl]; Ciprofloxacin; Clindamycin; Diphenhydramine; Doxycycline; Lovastatin; Nickel; and Shellfish-derived products    Objective:  Vitals:    02/04/19 1022   BP: 126/82   BP Location: Right arm   Patient Position: Sitting   Cuff Size: Standard   Pulse: 75   Resp: 12   Temp: 98 1 °F (36 7 °C)   TempSrc: Tympanic   SpO2: 98%   Weight: 69 9 kg (154 lb)   Height: 5' 2" (1 575 m)   Oxygen Therapy  SpO2: 98 %    Wt Readings from Last 3 Encounters:   02/04/19 69 9 kg (154 lb)   01/28/19 69 9 kg (154 lb)   09/28/18 71 1 kg (156 lb 12 8 oz)     Body mass index is 28 17 kg/m²  Physical Exam   Constitutional: She is oriented to person, place, and time  She appears well-developed and well-nourished  No distress  HENT:   Head: Normocephalic  Nose: Nose normal    Mouth/Throat: Oropharynx is clear and moist  No oropharyngeal exudate  Eyes: Pupils are equal, round, and reactive to light  Conjunctivae are normal    Neck: Neck supple  No JVD present  No tracheal deviation present  Cardiovascular: Normal rate, regular rhythm and normal heart sounds  Pulmonary/Chest: Effort normal    Faint expiratory wheeze in right lower lobe otherwise clear   Abdominal: Soft  She exhibits no distension  There is no tenderness  There is no guarding  Musculoskeletal: She exhibits no edema  Lymphadenopathy:     She has no cervical adenopathy  Neurological: She is alert and oriented to person, place, and time  Skin: Skin is warm and dry  No rash noted  Psychiatric: She has a normal mood and affect   Her behavior is normal  Thought content normal        Lab Review:   Lab Results   Component Value Date     01/22/2018    K 3 4 (L) 07/08/2018    K 4 7 01/22/2018     07/08/2018     01/22/2018    CO2 25 07/08/2018    CO2 24 01/22/2018    BUN 9 07/08/2018    BUN 13 01/22/2018    CREATININE 0 89 07/08/2018    CREATININE 0 81 01/22/2018    GLUCOSE 86 01/22/2018    CALCIUM 9 2 07/08/2018    CALCIUM 9 8 01/22/2018     Lab Results   Component Value Date    WBC 8 97 07/08/2018    WBC 8 1 01/22/2018    HGB 14 1 07/08/2018    HGB 13 7 01/22/2018    HCT 43 2 07/08/2018    HCT 41 8 01/22/2018    MCV 88 07/08/2018    MCV 88 01/22/2018     07/08/2018     01/22/2018       Diagnostics:  I have personally reviewed pertinent films in PACS  CT of chest performed on 1/24/19 without contrast revealed a stable 15 x 13 mm smooth bordered nodule in the right lower lobe no change compared to prior CT scans  There there are some smaller mostly ground-glass nodules bilaterally which are likely inflammatory  There has been no change in this nodule going back to CT scan December 15,  2017  She did have a PET CT scan done January 25, 2018 showed the nodule to be same in size with only minimal hypermetabolic activity with SUV of 2 4  Ct Renal Stone Study Abdomen Pelvis Wo Contrast    Result Date: 1/28/2019  Narrative: CT ABDOMEN AND PELVIS WITHOUT IV CONTRAST - LOW DOSE RENAL STONE INDICATION:   R10 9: Unspecified abdominal pain  COMPARISON:  CT abdomen and pelvis study of May 9, 2016  TECHNIQUE:  Low dose thin section CT examination of the abdomen and pelvis was performed without intravenous or oral contrast according to a protocol specifically designed to evaluate for urinary tract calculus  Axial, sagittal, and coronal 2D reformatted images were created from the source data and submitted for interpretation  Evaluation for pathology in the abdomen and pelvis that is unrelated to urinary tract calculi is limited  Radiation dose length product (DLP) for this visit:  248 82 mGy-cm     This examination, like all CT scans performed in the [facility], was performed utilizing techniques to minimize radiation dose exposure, including the use of iterative  reconstruction and automated exposure control  FINDINGS: RIGHT KIDNEY AND URETER: Nonobstructing 2 mm calculus in the upper pole  No hydronephrosis, perinephric stranding or hydroureter  The right kidney is mildly ptotic  LEFT KIDNEY AND URETER: No urinary tract calculi  No hydronephrosis or hydroureter  Previously noted left anterior cortical lesion measuring 1 cm is not defined on this noncontrast study  URINARY BLADDER: Unremarkable  No significant abnormality in the visualized lung bases  Mosaic attenuation  Limited low radiation dose noncontrast CT evaluation demonstrates no clinically significant abnormality of liver, spleen, pancreas, or adrenal glands  No calcified gallstones or gallbladder wall thickening noted  No ascites or bulky lymphadenopathy on this limited noncontrast study  Bowel loops appear unremarkable  Limited evaluation demonstrates no evidence to suggest acute appendicitis  1 4 cm calcified right fundal fibroid  Follicle/physiologic cyst in the right ovary  No free fluid  No acute fracture or destructive osseous lesion is identified  Lower lumbar facet arthropathy  Impression: 1  Approximate 2 mm nonobstructing right upper pole calculus in a mildly ptotic kidney  Previously noted left renal lesion not clearly visualized on this examination  2   Small calcified fundal fibroid  Workstation performed: [id number]     Ct Lung Nodule Follow-up    Result Date: 1/28/2019  Narrative: CT CHEST WITHOUT IV CONTRAST INDICATION:   R91 1: Solitary pulmonary nodule  Follow-up, current smoker  COMPARISON:  CT chest study July 24, 2018  TECHNIQUE:  Unenhanced CT examination of the chest was performed utilizing a low radiation dose protocol  Axial, sagittal, and coronal 2D reformatted images were created from the source data and submitted for interpretation   Radiation dose length product (DLP) for this visit: 109 6 mGy-cm   This examination, like all CT scans performed in the [facility], was performed utilizing techniques to minimize radiation dose exposure, including the use of iterative reconstruction and automated exposure control  FINDINGS: LUNGS:  Redemonstrated is the right lower lobe well-circumscribed 15 x 13 mm pulmonary nodule  There is an adjacent stable 2 mm groundglass nodule on image 72 of series 3  There is also a groundglass 3 mm nodule in the periphery of the left upper lobe on image 20 of series 3  The patchy ill-defined region of groundglass attenuation in the periphery of the left upper lobe on image 33 of series 3 is larger measuring 8 mm compared to the prior measurement of approximately 5 mm  The additional 4 mm groundglass nodule in the posterior left upper lobe on image 50 of series 3, measures 5 mm, which is not significantly changed when compared to the prior study  Interval development of a left upper lobe 3 mm pulmonary nodule along the expected course of the left apical bronchus  This may be endobronchial  There is background of mild centrilobular emphysema  PLEURA:  Unremarkable  HEART/GREAT VESSELS:  Unremarkable for patient's age  MEDIASTINUM AND ELMIRA:  Unremarkable  CHEST WALL AND LOWER NECK:   Redemonstrated asymmetric enlargement of the left inferior thyroid lobe likely secondary to an underlying nodule  VISUALIZED STRUCTURES IN THE UPPER ABDOMEN:  7 mm hypodense lesion within the dome of the liver  This is incompletely characterized but statistically likely to represent a cyst  OSSEOUS STRUCTURES:  No acute fracture or destructive osseous lesion  Impression: 1  Stable 15 x 13 mm right lower lobe, a nodule, stable when compared to the prior studies of July 24, 2017 and December 15, 2017  This lesion also demonstrated mild glucose avidity on prior PET imaging from January 25, 2018,  and is therefore likely benign and possibly representing a hamartoma   2  Multiple bilateral groundglass nodules without a solid component noted in the bilateral upper and right lower lobes  The largest is noted in the left upper lobe now measuring 8 mm compared to the prior measurement of 5 mm  These may represent smoking-related changes, follow-up is recommended  Based on current Fleischner Society 2017 Guidelines on incidental pulmonary nodule, followup noncontrast CT is recommended at 6-12 months from the initial examination to confirm persistence; if stable at that time, additional followup CT is recommended for every 2 years until 5 years of stability is demonstrated  Workstation performed: [id number]     Mammo Screening Bilateral W 3d & Cad    Result Date: 1/29/2019  Narrative: DIAGNOSIS: Screening breast examination RELEVANT HISTORY: Family Breast Cancer History: History of breast cancer in Family, Cousin  Family Medical history: Family medical history includes breast cancer in cousin, breast cancer in family, and colon cancer in 2 familys  Personal History: No relevant hormone history has been documented for this patient  No relevant surgical history has been documented for this patient  No relevant medical history has been documented for this patient  COMPARISONS: Compared to: 01/17/2018 Mammo diagnostic left w cad, 01/10/2018 Mammo screening bilateral w cad, 11/02/2016 Mammo screening bilateral w cad, and 10/28/2014 Mammo screening bilateral w cad INDICATION: Susy Skelton is a 66 y o  female presenting for screening  TECHNOLOGIST: Mick Metzger VIEWS: 2D views: CC, XCCL views of left, right breast(s) were taken  2D synth views: MLO views of left, right breast(s) were taken  3D views: MLO views of left, right breast(s) were taken  TECHNIQUE: The current study was evaluated with Computer Aided Detection  TISSUE DENSITY: There are scattered areas of fibroglandular density  The patient is scheduled in a reminder system for screening mammography   8-10% of cancers will be missed on mammography  Management of a palpable abnormality must be based on clinical grounds  Patients will be notified of their results via letter from our facility  Accredited by [employer] of Radiology and FDA  RISK ASSESSMENT: 5 Year Tyrer-Cuzick: 1 68 % 10 Year Tyrer-Cuzick: 3 38 % Lifetime Tyrer-Cuzick: 8 12 % FINDINGS: There are no suspicious masses, grouped microcalcifications or areas of architectural distortion  Impression: No mammographic evidence of malignancy  ASSESSMENT/BI-RADS CATEGORY: Left: 1 - Negative Right: 1 - Negative Overall: 1 - Negative RECOMMENDATION:      - Routine Screening Mammogram in 1 year for both breasts   Workstation ID: [id number]

## 2019-02-25 ENCOUNTER — TRANSCRIBE ORDERS (OUTPATIENT)
Dept: ADMINISTRATIVE | Facility: HOSPITAL | Age: 64
End: 2019-02-25

## 2019-02-25 DIAGNOSIS — R10.10 PAIN OF UPPER ABDOMEN: Primary | ICD-10-CM

## 2019-06-27 ENCOUNTER — HOSPITAL ENCOUNTER (OUTPATIENT)
Dept: RADIOLOGY | Facility: HOSPITAL | Age: 64
Discharge: HOME | End: 2019-06-27
Attending: INTERNAL MEDICINE
Payer: COMMERCIAL

## 2019-06-27 DIAGNOSIS — R91.8 LUNG NODULES: ICD-10-CM

## 2019-06-27 PROCEDURE — 71250 CT THORAX DX C-: CPT

## 2019-07-15 ENCOUNTER — TELEPHONE (OUTPATIENT)
Dept: PULMONOLOGY | Facility: MEDICAL CENTER | Age: 64
End: 2019-07-15

## 2019-07-15 NOTE — TELEPHONE ENCOUNTER
I called patient  Got her voicemail and left message at CT of chest done in June showed no change in lung nodules  She does have follow-up visit in September    She can call our office if she had any questions

## 2019-11-11 ENCOUNTER — HOSPITAL ENCOUNTER (EMERGENCY)
Facility: HOSPITAL | Age: 64
Discharge: HOME | End: 2019-11-11
Attending: EMERGENCY MEDICINE
Payer: COMMERCIAL

## 2019-11-11 ENCOUNTER — APPOINTMENT (EMERGENCY)
Dept: RADIOLOGY | Facility: HOSPITAL | Age: 64
End: 2019-11-11
Payer: COMMERCIAL

## 2019-11-11 VITALS
SYSTOLIC BLOOD PRESSURE: 124 MMHG | HEIGHT: 62 IN | DIASTOLIC BLOOD PRESSURE: 67 MMHG | HEART RATE: 73 BPM | OXYGEN SATURATION: 96 % | WEIGHT: 155 LBS | BODY MASS INDEX: 28.52 KG/M2 | RESPIRATION RATE: 18 BRPM | TEMPERATURE: 97.7 F

## 2019-11-11 DIAGNOSIS — R07.9 CHEST PAIN: Primary | ICD-10-CM

## 2019-11-11 LAB
ALBUMIN SERPL BCP-MCNC: 3.8 G/DL (ref 3.5–5)
ALP SERPL-CCNC: 119 U/L (ref 46–116)
ALT SERPL W P-5'-P-CCNC: 36 U/L (ref 12–78)
ANION GAP SERPL CALCULATED.3IONS-SCNC: 8 MMOL/L (ref 4–13)
AST SERPL W P-5'-P-CCNC: 22 U/L (ref 5–45)
BASOPHILS # BLD AUTO: 0.06 THOUSANDS/ΜL (ref 0–0.1)
BASOPHILS NFR BLD AUTO: 1 % (ref 0–1)
BILIRUB SERPL-MCNC: 0.4 MG/DL (ref 0.2–1)
BUN SERPL-MCNC: 12 MG/DL (ref 5–25)
CALCIUM SERPL-MCNC: 9.3 MG/DL (ref 8.3–10.1)
CHLORIDE SERPL-SCNC: 104 MMOL/L (ref 100–108)
CO2 SERPL-SCNC: 29 MMOL/L (ref 21–32)
CREAT SERPL-MCNC: 0.86 MG/DL (ref 0.6–1.3)
EOSINOPHIL # BLD AUTO: 0.18 THOUSAND/ΜL (ref 0–0.61)
EOSINOPHIL NFR BLD AUTO: 2 % (ref 0–6)
ERYTHROCYTE [DISTWIDTH] IN BLOOD BY AUTOMATED COUNT: 12.9 % (ref 11.6–15.1)
GFR SERPL CREATININE-BSD FRML MDRD: 73 ML/MIN/1.73SQ M
GLUCOSE SERPL-MCNC: 109 MG/DL (ref 65–140)
HCT VFR BLD AUTO: 43.8 % (ref 34.8–46.1)
HGB BLD-MCNC: 13.8 G/DL (ref 11.5–15.4)
IMM GRANULOCYTES # BLD AUTO: 0.05 THOUSAND/UL (ref 0–0.2)
IMM GRANULOCYTES NFR BLD AUTO: 1 % (ref 0–2)
LYMPHOCYTES # BLD AUTO: 2.08 THOUSANDS/ΜL (ref 0.6–4.47)
LYMPHOCYTES NFR BLD AUTO: 21 % (ref 14–44)
MCH RBC QN AUTO: 29.1 PG (ref 26.8–34.3)
MCHC RBC AUTO-ENTMCNC: 31.5 G/DL (ref 31.4–37.4)
MCV RBC AUTO: 92 FL (ref 82–98)
MONOCYTES # BLD AUTO: 0.6 THOUSAND/ΜL (ref 0.17–1.22)
MONOCYTES NFR BLD AUTO: 6 % (ref 4–12)
NEUTROPHILS # BLD AUTO: 7.08 THOUSANDS/ΜL (ref 1.85–7.62)
NEUTS SEG NFR BLD AUTO: 69 % (ref 43–75)
NRBC BLD AUTO-RTO: 0 /100 WBCS
PLATELET # BLD AUTO: 216 THOUSANDS/UL (ref 149–390)
PMV BLD AUTO: 9.2 FL (ref 8.9–12.7)
POTASSIUM SERPL-SCNC: 4 MMOL/L (ref 3.5–5.3)
PROT SERPL-MCNC: 7.3 G/DL (ref 6.4–8.2)
RBC # BLD AUTO: 4.74 MILLION/UL (ref 3.81–5.12)
SODIUM SERPL-SCNC: 141 MMOL/L (ref 136–145)
TROPONIN I SERPL-MCNC: <0.02 NG/ML
TROPONIN I SERPL-MCNC: <0.02 NG/ML
WBC # BLD AUTO: 10.05 THOUSAND/UL (ref 4.31–10.16)

## 2019-11-11 PROCEDURE — 99285 EMERGENCY DEPT VISIT HI MDM: CPT

## 2019-11-11 PROCEDURE — 36415 COLL VENOUS BLD VENIPUNCTURE: CPT | Performed by: EMERGENCY MEDICINE

## 2019-11-11 PROCEDURE — 80053 COMPREHEN METABOLIC PANEL: CPT | Performed by: EMERGENCY MEDICINE

## 2019-11-11 PROCEDURE — 71045 X-RAY EXAM CHEST 1 VIEW: CPT

## 2019-11-11 PROCEDURE — 84484 ASSAY OF TROPONIN QUANT: CPT | Performed by: EMERGENCY MEDICINE

## 2019-11-11 PROCEDURE — 93005 ELECTROCARDIOGRAM TRACING: CPT

## 2019-11-11 PROCEDURE — 85025 COMPLETE CBC W/AUTO DIFF WBC: CPT | Performed by: EMERGENCY MEDICINE

## 2019-11-11 RX ORDER — NICOTINE 21 MG/24HR
21 PATCH, TRANSDERMAL 24 HOURS TRANSDERMAL ONCE
Status: DISCONTINUED | OUTPATIENT
Start: 2019-11-11 | End: 2019-11-11 | Stop reason: HOSPADM

## 2019-11-11 RX ORDER — NITROGLYCERIN 0.4 MG/1
0.4 TABLET SUBLINGUAL ONCE
Status: COMPLETED | OUTPATIENT
Start: 2019-11-11 | End: 2019-11-11

## 2019-11-11 RX ORDER — ASPIRIN 81 MG/1
324 TABLET, CHEWABLE ORAL ONCE
Status: COMPLETED | OUTPATIENT
Start: 2019-11-11 | End: 2019-11-11

## 2019-11-11 RX ADMIN — NITROGLYCERIN 0.4 MG: 0.4 TABLET SUBLINGUAL at 08:35

## 2019-11-11 RX ADMIN — NITROGLYCERIN 1 INCH: 20 OINTMENT TOPICAL at 09:18

## 2019-11-11 RX ADMIN — NITROGLYCERIN 0.4 MG: 0.4 TABLET SUBLINGUAL at 08:26

## 2019-11-11 RX ADMIN — ASPIRIN 81 MG 324 MG: 81 TABLET ORAL at 08:21

## 2019-11-11 RX ADMIN — NICOTINE 21 MG: 21 PATCH TRANSDERMAL at 10:26

## 2019-11-11 NOTE — ED PROVIDER NOTES
History  Chief Complaint   Patient presents with    Chest Pain     Pt sts has chest pain that radiates to back for 1 week  Sts has been moving into new home and she thought it was related to that  Now pain more constant  Associated with nausea  Patient has known coronary artery disease and multiple cardiac risk factors  She has undergone cardiac catheterization 14 months ago, but no stent  Patient has continued to smoke, recently stopped taking her metoprolol  She states she has been moving furniture and heavy boxes recently and developed pain in the left chest radiating to the left shoulder starting about a week ago  Patient states that the pain would come and go and was not associated with exertion  However she thought it was a muscle and did not seek medical attention until now when the pain has become constant  She states she has mild shortness of breath no nausea or diaphoresis  Patient has not taken nitroglycerin  Her last aspirin was last night          Prior to Admission Medications   Prescriptions Last Dose Informant Patient Reported?  Taking?   aspirin 81 mg chewable tablet  Self Yes No   Sig: Chew 81 mg daily   atorvastatin (LIPITOR) 10 mg tablet  Self Yes No   Sig: Take 10 mg by mouth daily at bedtime   dicyclomine (BENTYL) 20 mg tablet  Self No No   Sig: Take 1 tablet (20 mg total) by mouth 3 (three) times a day as needed (Abdominal pain)   ezetimibe (ZETIA) 10 mg tablet  Self Yes No   Sig: Take 10 mg by mouth daily   metoprolol tartrate (LOPRESSOR) 25 mg tablet  Self Yes No   Sig: Take 12 5 mg by mouth 2 (two) times a day   nicotine (NICOTROL) 10 MG inhaler   No No   Sig: Inhale 1 puff as needed for smoking cessation Use 1 cartridge per hour up to 10 a day as needed   pantoprazole (PROTONIX) 40 mg tablet  Self No No   Sig: Take 1 tablet (40 mg total) by mouth daily   saccharomyces boulardii (FLORASTOR) 250 mg capsule  Self No No   Sig: Take 1 capsule (250 mg total) by mouth 2 (two) times a day      Facility-Administered Medications: None       Past Medical History:   Diagnosis Date    Acid reflux     Anxiety     Arthritis     Cancer ([facility] [address] )     skin on chin    Chronic kidney disease     (R) kidney smaller than (L),  kidney stones    Chronic UTI (urinary tract infection)     Colitis     microscopic    COPD (chronic obstructive pulmonary disease) (HCC)     mild, recent dx    Depression     Epidermal cyst of face     last assessed 10-    Heart attack ([facility] [address] ) 09/20/2018    [place] (hard of hearing)     deaf (L) ear, decreased hearing (R) ear    Hyperlipemia     diet controlled    Hyperlipidemia     IBS (irritable bowel syndrome)     Malignant neoplasm of skin     Meniere disease     Numbness and tingling of left side of face     Uterine leiomyoma        Past Surgical History:   Procedure Laterality Date    CARDIAC CATHETERIZATION      9/21/18 [facility] - severe CAD of the LAD and diagonal artery    COLONOSCOPY N/A 11/4/2016    Procedure: COLONOSCOPY;  Surgeon: Melissa Medrano MD;  Location: [place] [address] GI LAB; Service:     ERCP      ESOPHAGOGASTRODUODENOSCOPY N/A 11/4/2016    Procedure: ESOPHAGOGASTRODUODENOSCOPY (EGD); Surgeon: Melissa Medrano MD;  Location: [facility] GI LAB; Service:     EYE SURGERY       [facility] [address] CATARACT EXTRACAP,INSERT LENS Left 3/7/2016    Procedure: EXTRACTION EXTRACAPSULAR CATARACT PHACO INTRAOCULAR LENS (IOL);   Surgeon: Kaela Lara MD;  Location: [facility] MAIN OR;  Service: Ophthalmology    SKIN CANCER EXCISION      excision skin cancer on chin       Family History   Problem Relation Age of Onset    Heart disease Mother         cardiac disorder    Lung cancer Mother     COPD Mother     Colonic polyp Sister     Thyroid cancer Sister     Osteoporosis Family     Colon cancer Family     Heart disease Family         cardiac disorder    Heart disease Family         cardiac disorder    Stroke Family     Arthritis Family     Colon cancer Family     Lung cancer Family     Cancer Family         anterior wall of urinanry bladder    Breast cancer Family     Other Family         brain tumor    Breast cancer Cousin     Brain cancer Father     Skin cancer Brother     Skin cancer Brother     Diabetes Son     Neuropathy Son     No Known Problems Son      I have reviewed and agree with the history as documented  Social History     Tobacco Use    Smoking status: Current Every Day Smoker     Packs/day: 0 50     Years: 43 00     Pack years: 21 50     Types: Cigarettes    Smokeless tobacco: Never Used    Tobacco comment: 45+ years per allscripts   Substance Use Topics    Alcohol use: No    Drug use: No        Review of Systems   Constitutional: Negative for chills and fever  HENT: Negative for congestion and sore throat  Eyes: Negative for visual disturbance  Respiratory: Positive for chest tightness and shortness of breath  Negative for wheezing  Cardiovascular: Positive for chest pain  Negative for palpitations and leg swelling  Gastrointestinal: Negative for abdominal pain  Genitourinary: Negative for dysuria  Musculoskeletal: Positive for arthralgias  Negative for neck pain  Skin: Negative for rash  Neurological: Negative for light-headedness and headaches  Hematological: Does not bruise/bleed easily  Psychiatric/Behavioral: Negative for confusion  All other systems reviewed and are negative  Physical Exam  Physical Exam   Constitutional: She is oriented to person, place, and time  She appears well-developed and well-nourished  HENT:   Head: Normocephalic and atraumatic  Eyes: EOM are normal    Neck: Normal range of motion  Neck supple  Cardiovascular: Normal rate, regular rhythm, intact distal pulses and normal pulses  No murmur heard  Pulmonary/Chest: Effort normal and breath sounds normal  She has no wheezes  Abdominal: Soft  Bowel sounds are normal  There is no tenderness     Musculoskeletal:        Right lower leg: Normal         Left lower leg: Normal    Neurological: She is alert and oriented to person, place, and time  Skin: Skin is warm and dry  Capillary refill takes less than 2 seconds  Psychiatric: She has a normal mood and affect  Her behavior is normal    Nursing note and vitals reviewed  Vital Signs  ED Triage Vitals [11/11/19 0801]   Temperature Pulse Respirations Blood Pressure SpO2   98 5 °F (36 9 °C) 105 20 (!) 221/98 99 %      Temp src Heart Rate Source Patient Position - Orthostatic VS BP Location FiO2 (%)   -- -- -- -- --      Pain Score       2           Vitals:    11/11/19 0801   BP: (!) 221/98   Pulse: 105         Visual Acuity      ED Medications  Medications - No data to display    Diagnostic Studies  Results Reviewed     None                 No orders to display              Procedures  ECG 12 Lead Documentation Only  Date/Time: 11/11/2019 7:59 AM  Performed by: Tereza Marte MD  Authorized by: Tereza Marte MD     Indications / Diagnosis:  Chest pain  ECG reviewed by me, the ED Provider: yes    Patient location:  ED  Interpretation:     Interpretation: abnormal    Rate:     ECG rate:  110    ECG rate assessment: tachycardic    Rhythm:     Rhythm: sinus tachycardia    Ectopy:     Ectopy: none    QRS:     QRS axis:  Normal    QRS intervals:  Normal  Conduction:     Conduction: normal    ST segments:     ST segments:  Abnormal    Depression:  V4, V5 and V6  T waves:     T waves: normal             ED Course                               MDM    Disposition  Final diagnoses:   None     ED Disposition     None      Follow-up Information    None         Patient's Medications   Discharge Prescriptions    No medications on file     No discharge procedures on file      ED Provider  Electronically Signed by           Tereza Marte MD  11/11/19 7959

## 2019-11-11 NOTE — ED NOTES
Pt requested a cup of coffee   Ok to give decaffeinated coffee as per MD Cruz [facility]  11/11/19 5050

## 2019-11-11 NOTE — ED NOTES
Pt reported intermittent CP started a week ago, and has been increasing and constant since last night  Pt reported that she was moving and lifting heavy furniture last week and hurt her PUSHPA  Pain is producible when area is palpate  Pt reported that previous nitro has helped with her pain which is becomes intermittent  Pt is smoker and takes baby ASA each night        Anthony Carroll RN  11/11/19 100 [address] Rubi Joshi RN  11/11/19 5977

## 2019-11-12 LAB
ATRIAL RATE: 110 BPM
P AXIS: 64 DEGREES
PR INTERVAL: 140 MS
QRS AXIS: 20 DEGREES
QRSD INTERVAL: 84 MS
QT INTERVAL: 340 MS
QTC INTERVAL: 460 MS
T WAVE AXIS: 6 DEGREES
VENTRICULAR RATE: 110 BPM

## 2019-11-12 PROCEDURE — 93010 ELECTROCARDIOGRAM REPORT: CPT | Performed by: INTERNAL MEDICINE

## 2020-02-27 ENCOUNTER — APPOINTMENT (OUTPATIENT)
Dept: RADIOLOGY | Facility: CLINIC | Age: 65
End: 2020-02-27
Payer: COMMERCIAL

## 2020-02-27 ENCOUNTER — OFFICE VISIT (OUTPATIENT)
Dept: OBGYN CLINIC | Facility: CLINIC | Age: 65
End: 2020-02-27
Payer: COMMERCIAL

## 2020-02-27 ENCOUNTER — TELEPHONE (OUTPATIENT)
Dept: OBGYN CLINIC | Facility: CLINIC | Age: 65
End: 2020-02-27

## 2020-02-27 VITALS
DIASTOLIC BLOOD PRESSURE: 80 MMHG | WEIGHT: 159 LBS | BODY MASS INDEX: 29.26 KG/M2 | HEART RATE: 80 BPM | HEIGHT: 62 IN | SYSTOLIC BLOOD PRESSURE: 142 MMHG

## 2020-02-27 DIAGNOSIS — M25.572 PAIN, JOINT, ANKLE AND FOOT, LEFT: ICD-10-CM

## 2020-02-27 DIAGNOSIS — R22.42 LOCALIZED SWELLING OF LEFT FOOT: ICD-10-CM

## 2020-02-27 DIAGNOSIS — M79.672 PAIN IN LEFT FOOT: ICD-10-CM

## 2020-02-27 DIAGNOSIS — M77.52 LEFT ANKLE TENDINITIS: Primary | ICD-10-CM

## 2020-02-27 PROCEDURE — 73610 X-RAY EXAM OF ANKLE: CPT

## 2020-02-27 PROCEDURE — 73630 X-RAY EXAM OF FOOT: CPT

## 2020-02-27 PROCEDURE — 99214 OFFICE O/P EST MOD 30 MIN: CPT | Performed by: ORTHOPAEDIC SURGERY

## 2020-02-27 NOTE — PROGRESS NOTES
Assessment/Plan:  1  Left ankle tendinitis  XR ankle 3+ vw left   2  Localized swelling of left foot  XR foot 3+ vw left       George Martin has left ankle pain and swelling that that is directly over her anterior tibialis tendon on the left side  It is slightly swollen and is likely an overuse injury  We advised her to try anti-inflammatories and a compression wrap  I did give her some home exercises to try as well as considering formal physical therapy  I do not think this is coming from her lumbar spine  She is also having difficulty with balance related to Meniere's disease and could consider balance therapy in the future  Subjective:   Johnathon Waggoner is a 60 y o  female who presents to the office for evaluation for left ankle pain and foot swelling  She denies any injury or trauma  She has noticed increased pain in the foot and ankle developing over the past few weeks  She denies rolling her ankle at any point  She does have intermittent pain in both ankles and has had multiple x-rays without any significant abnormality found  She has pain today that is aching and throbbing over the dorsum of her left foot  It worsens with walking and improves with rest   She does have a job where she stands on her feet for hours at a time  She wonders if this makes her foot worst   She also has a history of Meniere's disease and causes balance issues and difficulty walking  Review of Systems   Constitutional: Positive for unexpected weight change  Negative for chills and fever  HENT: Positive for hearing loss  Negative for nosebleeds and sore throat  Eyes: Negative for pain, redness and visual disturbance  Respiratory: Negative for cough, shortness of breath and wheezing  Cardiovascular: Positive for leg swelling  Negative for chest pain and palpitations  Gastrointestinal: Positive for abdominal pain and vomiting  Negative for nausea  Endocrine: Positive for polyuria  Negative for polydipsia  Genitourinary: Negative for dysuria and hematuria  Musculoskeletal:        See HPI   Skin: Negative for rash and wound  Neurological: Positive for numbness and headaches  Negative for dizziness  Psychiatric/Behavioral: Negative for decreased concentration and suicidal ideas  The patient is not nervous/anxious  Past Medical History:   Diagnosis Date    Acid reflux     Anxiety     Arthritis     Cancer ([facility] [address] )     skin on chin    Cardiac disease     Chronic kidney disease     (R) kidney smaller than (L),  kidney stones    Chronic UTI (urinary tract infection)     Colitis     microscopic    COPD (chronic obstructive pulmonary disease) (HCC)     mild, recent dx    Depression     Epidermal cyst of face     last assessed 10-    Heart attack ([facility] [address] ) 09/20/2018    [place] (hard of hearing)     deaf (L) ear, decreased hearing (R) ear    Hyperlipemia     diet controlled    Hyperlipidemia     IBS (irritable bowel syndrome)     Malignant neoplasm of skin     Meniere disease     Numbness and tingling of left side of face     Uterine leiomyoma        Past Surgical History:   Procedure Laterality Date    CARDIAC CATHETERIZATION      9/21/18 [facility] - severe CAD of the LAD and diagonal artery    COLONOSCOPY N/A 11/4/2016    Procedure: COLONOSCOPY;  Surgeon: Parmjit Salcedo MD;  Location: [facility] GI LAB; Service:     ERCP      ESOPHAGOGASTRODUODENOSCOPY N/A 11/4/2016    Procedure: ESOPHAGOGASTRODUODENOSCOPY (EGD); Surgeon: Parmjit Salcedo MD;  Location: [facility] GI LAB; Service:     EYE SURGERY      AL XCAPSL CTRC RMVL INSJ IO LENS PROSTH W/O ECP Left 3/7/2016    Procedure: EXTRACTION EXTRACAPSULAR CATARACT PHACO INTRAOCULAR LENS (IOL);   Surgeon: Ayad Glass MD;  Location: [facility] MAIN OR;  Service: Ophthalmology    SKIN CANCER EXCISION      excision skin cancer on chin       Family History   Problem Relation Age of Onset    Heart disease Mother         cardiac disorder    Lung cancer Mother     COPD Mother     Colonic polyp Sister     Thyroid cancer Sister     Osteoporosis Family     Colon cancer Family     Heart disease Family         cardiac disorder    Heart disease Family         cardiac disorder    Stroke Family     Arthritis Family     Colon cancer Family     Lung cancer Family     Cancer Family         anterior wall of urinanry bladder    Breast cancer Family     Other Family         brain tumor    Breast cancer Cousin     Brain cancer Father     Skin cancer Brother     Skin cancer Brother     Diabetes Son     Neuropathy Son     No Known Problems Son        Social History     Occupational History    Not on file   Tobacco Use    Smoking status: Current Every Day Smoker     Packs/day: 0 50     Years: 43 00     Pack years: 21 50     Types: Cigarettes    Smokeless tobacco: Never Used    Tobacco comment: 45+ years per allscripts   Substance and Sexual Activity    Alcohol use: No    Drug use: No    Sexual activity: Not on file         Current Outpatient Medications:     aspirin 81 mg chewable tablet, Chew 81 mg daily, Disp: , Rfl:     atorvastatin (LIPITOR) 10 mg tablet, Take 20 mg by mouth daily at bedtime , Disp: , Rfl: 0    ezetimibe (ZETIA) 10 mg tablet, Take 10 mg by mouth daily, Disp: , Rfl:     nicotine (NICOTROL) 10 MG inhaler, Inhale 1 puff as needed for smoking cessation Use 1 cartridge per hour up to 10 a day as needed (Patient not taking: Reported on 2/27/2020), Disp: 168 each, Rfl: 3    Allergies   Allergen Reactions    Erythromycin Hives and Itching    Nizatidine Other (See Comments)     Severe chest pain  Severe chest pain    Penicillins Hives and Itching     Rash, hives    Wellbutrin [Bupropion] Other (See Comments)     Elevated  / 110    Ciprofloxacin Hcl Hives    Fish-Derived Products GI Intolerance     Tuna fish & all shell fish    Other Hives     allegic to all cillans and all mycins    Ampicillin     Benadryl [Diphenhydramine Hcl] Other (See Comments)     unknown    Ciprofloxacin      Severe hives, sob?  Clindamycin     Diphenhydramine      Facial swelling ?  Doxycycline     Lovastatin      Muscle aches  Muscle aches    Nickel Other (See Comments)     Throat swelling/itching    Shellfish-Derived Products GI Intolerance     Pt unsure       Objective:  Vitals:    02/27/20 1046   BP: 142/80   Pulse: 80       Left Ankle Exam     Tenderness   Left ankle tenderness location: Tenderness to palpation over superior portion of left ankle at insertion of tibialis anterior and talar dome  Range of Motion   Dorsiflexion:  15 abnormal   Plantar flexion: normal   Eversion: normal   Inversion: normal     Muscle Strength   Dorsiflexion:  4/5   Plantar flexion:  5/5   Anterior tibial:  5/5   Posterior tibial:  5/5  Gastrocsoleus:  5/5  Peroneal muscle:  5/5    Other   Erythema: absent  Sensation: normal  Pulse: present          Strength/Myotome Testing     Left Ankle/Foot   Dorsiflexion: 4  Plantar flexion: 5      Physical Exam   Constitutional: She is oriented to person, place, and time  She appears well-developed and well-nourished  HENT:   Head: Normocephalic and atraumatic  Eyes: Pupils are equal, round, and reactive to light  Conjunctivae are normal    Neck: Normal range of motion  Neck supple  Cardiovascular: Normal rate and intact distal pulses  Pulmonary/Chest: Effort normal  No respiratory distress  Musculoskeletal:   As noted in HPI   Neurological: She is alert and oriented to person, place, and time  Skin: Skin is warm and dry  Psychiatric: She has a normal mood and affect  Her behavior is normal    Nursing note and vitals reviewed  I have personally reviewed pertinent films in PACS and my interpretation is as follows: Three-view x-rays of the left foot and three-view x-rays of the left ankle demonstrate no evidence of acute fracture

## 2020-02-27 NOTE — TELEPHONE ENCOUNTER
Renea Gregory wants to know if she can have a work note for the week   She stated she saw you today and that she was supposed to work Saturday and Sunday for 5 hours and that you told her she shouldn't be on her feet    Please Advise

## 2020-03-03 ENCOUNTER — APPOINTMENT (OUTPATIENT)
Dept: RADIOLOGY | Facility: CLINIC | Age: 65
End: 2020-03-03
Payer: COMMERCIAL

## 2020-03-03 ENCOUNTER — OFFICE VISIT (OUTPATIENT)
Dept: OBGYN CLINIC | Facility: CLINIC | Age: 65
End: 2020-03-03
Payer: COMMERCIAL

## 2020-03-03 VITALS
WEIGHT: 159 LBS | DIASTOLIC BLOOD PRESSURE: 76 MMHG | HEART RATE: 82 BPM | BODY MASS INDEX: 29.26 KG/M2 | SYSTOLIC BLOOD PRESSURE: 124 MMHG | HEIGHT: 62 IN

## 2020-03-03 DIAGNOSIS — M25.561 PAIN IN BOTH KNEES, UNSPECIFIED CHRONICITY: ICD-10-CM

## 2020-03-03 DIAGNOSIS — M54.50 LOW BACK PAIN, UNSPECIFIED BACK PAIN LATERALITY, UNSPECIFIED CHRONICITY, UNSPECIFIED WHETHER SCIATICA PRESENT: Primary | ICD-10-CM

## 2020-03-03 DIAGNOSIS — M25.562 PAIN IN BOTH KNEES, UNSPECIFIED CHRONICITY: ICD-10-CM

## 2020-03-03 DIAGNOSIS — M77.52 LEFT ANKLE TENDINITIS: ICD-10-CM

## 2020-03-03 DIAGNOSIS — M54.50 LOW BACK PAIN, UNSPECIFIED BACK PAIN LATERALITY, UNSPECIFIED CHRONICITY, UNSPECIFIED WHETHER SCIATICA PRESENT: ICD-10-CM

## 2020-03-03 DIAGNOSIS — M17.0 PRIMARY OSTEOARTHRITIS OF BOTH KNEES: ICD-10-CM

## 2020-03-03 PROCEDURE — 99213 OFFICE O/P EST LOW 20 MIN: CPT | Performed by: ORTHOPAEDIC SURGERY

## 2020-03-03 PROCEDURE — 72100 X-RAY EXAM L-S SPINE 2/3 VWS: CPT

## 2020-03-03 PROCEDURE — 73562 X-RAY EXAM OF KNEE 3: CPT

## 2020-03-03 NOTE — PROGRESS NOTES
Assessment/Plan:  1  Low back pain, unspecified back pain laterality, unspecified chronicity, unspecified whether sciatica present  XR spine lumbar 2 or 3 views injury    Ambulatory referral to Physical Therapy   2  Primary osteoarthritis of both knees  XR knee 3 vw right non injury    XR knee 3 vw left non injury   3  Left ankle tendinitis  Ambulatory referral to Physical Therapy       Scribe Attestation    I,:   Fredy Harding am acting as a scribe while in the presence of the attending physician :        I,:   Rosalie Chauhan DO personally performed the services described in this documentation    as scribed in my presence :              Chadd Cisneros has bilateral knee pain due to mild degenerative changes of her knees and low back pain  We discussed that we could treat this with a cortisone injection however she would like to wait as knees are not as painful as her low back  In regards to the low back I do think we should start physical therapy as the first form of treatment  Chadd Cisneros agrees with this treatment plan  She was given a referral to physical therapy at today's appointment  She can take over-the-counter Tylenol and he can ice the areas as needed  She will follow up in the office in 6 weeks once physical therapy has been completed  Subjective:   Shandra Gibbons is a 60 y o  female who presents to the office today for bilateral knee pain and low back pain  She states she has a long history of bilateral knee pain starting many years ago  She states the pain started while she was riding a bike  She denies any direct injury or trauma to the knees  She has since stopped riding bikes because the pain  At today's appointment she states the pain is mostly on the outside of her knees  She states the pain is an intermittent throbbing pain that worsens with increased walking and riding a bike  She denies any mechanical symptoms or giving out    She states more recently she has been experiencing constant low back pain  Her pain is a mild to moderate pain that causes her to wake up in the middle of the night  Her pain increases with movement  She does experience pain radiating down her left leg with intermittent numbness into the left foot and calf  Review of Systems   Constitutional: Positive for activity change and unexpected weight change  Negative for chills and fever  HENT: Negative for hearing loss, nosebleeds and sore throat  Eyes: Negative for pain, redness and visual disturbance  Respiratory: Negative for cough, shortness of breath and wheezing  Cardiovascular: Negative for chest pain, palpitations and leg swelling  Gastrointestinal: Positive for abdominal pain and nausea  Negative for vomiting  Endocrine: Negative for polydipsia and polyuria  Genitourinary: Negative for dysuria and hematuria  Musculoskeletal: Positive for arthralgias, myalgias and neck pain  See HPI   Skin: Negative for rash and wound  Neurological: Positive for numbness  Negative for dizziness and headaches  Psychiatric/Behavioral: Negative for decreased concentration and suicidal ideas  The patient is not nervous/anxious            Past Medical History:   Diagnosis Date    Acid reflux     Anxiety     Arthritis     Cancer ([facility] [address] )     skin on chin    Cardiac disease     Chronic kidney disease     (R) kidney smaller than (L),  kidney stones    Chronic UTI (urinary tract infection)     Colitis     microscopic    COPD (chronic obstructive pulmonary disease) (HCC)     mild, recent dx    Depression     Epidermal cyst of face     last assessed 10-    Heart attack ([facility] [address] ) 09/20/2018    [place] (hard of hearing)     deaf (L) ear, decreased hearing (R) ear    Hyperlipemia     diet controlled    Hyperlipidemia     IBS (irritable bowel syndrome)     Malignant neoplasm of skin     Meniere disease     Numbness and tingling of left side of face     Uterine leiomyoma        Past Surgical History: Procedure Laterality Date    CARDIAC CATHETERIZATION      9/21/18 [facility] - severe CAD of the LAD and diagonal artery    COLONOSCOPY N/A 11/4/2016    Procedure: COLONOSCOPY;  Surgeon: Corbin Brock MD;  Location: [facility] GI LAB; Service:     ERCP      ESOPHAGOGASTRODUODENOSCOPY N/A 11/4/2016    Procedure: ESOPHAGOGASTRODUODENOSCOPY (EGD); Surgeon: Corbin Brock MD;  Location: [facility] GI LAB; Service:     EYE SURGERY      KS XCAPSL CTRC RMVL INSJ IO LENS PROSTH W/O ECP Left 3/7/2016    Procedure: EXTRACTION EXTRACAPSULAR CATARACT PHACO INTRAOCULAR LENS (IOL);   Surgeon: Mamadou Rosenthal MD;  Location: [facility] MAIN OR;  Service: Ophthalmology    SKIN CANCER EXCISION      excision skin cancer on chin       Family History   Problem Relation Age of Onset    Heart disease Mother         cardiac disorder    Lung cancer Mother     COPD Mother     Colonic polyp Sister     Thyroid cancer Sister     Osteoporosis Family     Colon cancer Family     Heart disease Family         cardiac disorder    Heart disease Family         cardiac disorder    Stroke Family     Arthritis Family     Colon cancer Family     Lung cancer Family     Cancer Family         anterior wall of urinanry bladder    Breast cancer Family     Other Family         brain tumor    Breast cancer Cousin     Brain cancer Father     Skin cancer Brother     Skin cancer Brother     Diabetes Son     Neuropathy Son     No Known Problems Son        Social History     Occupational History    Not on file   Tobacco Use    Smoking status: Current Every Day Smoker     Packs/day: 0 50     Years: 43 00     Pack years: 21 50     Types: Cigarettes    Smokeless tobacco: Never Used    Tobacco comment: 45+ years per allscripts   Substance and Sexual Activity    Alcohol use: No    Drug use: No    Sexual activity: Not on file         Current Outpatient Medications:     aspirin 81 mg chewable tablet, Chew 81 mg daily, Disp: , Rfl:    atorvastatin (LIPITOR) 10 mg tablet, Take 20 mg by mouth daily at bedtime , Disp: , Rfl: 0    ezetimibe (ZETIA) 10 mg tablet, Take 10 mg by mouth daily, Disp: , Rfl:     nicotine (NICOTROL) 10 MG inhaler, Inhale 1 puff as needed for smoking cessation Use 1 cartridge per hour up to 10 a day as needed (Patient not taking: Reported on 2/27/2020), Disp: 168 each, Rfl: 3    Allergies   Allergen Reactions    Erythromycin Hives and Itching    Nizatidine Other (See Comments)     Severe chest pain  Severe chest pain    Penicillins Hives and Itching     Rash, hives    Wellbutrin [Bupropion] Other (See Comments)     Elevated  / 110    Ciprofloxacin Hcl Hives    Fish-Derived Products GI Intolerance     Tuna fish & all shell fish    Other Hives     allegic to all cillans and all mycins    Ampicillin     Benadryl [Diphenhydramine Hcl] Other (See Comments)     unknown    Ciprofloxacin      Severe hives, sob?  Clindamycin     Diphenhydramine      Facial swelling ?  Doxycycline     Lovastatin      Muscle aches  Muscle aches    Nickel Other (See Comments)     Throat swelling/itching    Shellfish-Derived Products GI Intolerance     Pt unsure       Objective:  Vitals:    03/03/20 1048   BP: 124/76   Pulse: 82       Right Knee Exam     Muscle Strength   The patient has normal right knee strength  Tenderness   The patient is experiencing tenderness in the medial joint line  Range of Motion   The patient has normal right knee ROM  Other   Sensation: normal  Swelling: none  Effusion: no effusion present      Left Knee Exam     Muscle Strength   The patient has normal left knee strength  Tenderness   The patient is experiencing tenderness in the medial joint line  Range of Motion   The patient has normal left knee ROM  Other   Sensation: normal  Swelling: none  Effusion: no effusion present      Back Exam     Tenderness   The patient is experiencing tenderness in the lumbar      Range of Motion The patient has normal back ROM  Muscle Strength   The patient has normal back strength  Right Quadriceps:  5/5   Left Quadriceps:  5/5   Right Hamstrings:  5/5   Left Hamstrings:  5/5     Tests   Straight leg raise right: negative  Straight leg raise left: negative    Other   Sensation: normal    Comments:  Tenderness over right SI joint  Tenderness over midline L4-L5    5/5 bilateral great toe             Observations   Left Knee   Negative for effusion  Right Knee   Negative for effusion  Physical Exam   Constitutional: She is oriented to person, place, and time  She appears well-developed and well-nourished  HENT:   Head: Normocephalic and atraumatic  Eyes: Pupils are equal, round, and reactive to light  Conjunctivae are normal    Neck: Normal range of motion  Neck supple  Cardiovascular: Normal rate and intact distal pulses  Pulmonary/Chest: Effort normal  No respiratory distress  Musculoskeletal:        Right knee: She exhibits no effusion  Left knee: She exhibits no effusion  As noted in HPI   Neurological: She is alert and oriented to person, place, and time  Skin: Skin is warm and dry  Psychiatric: She has a normal mood and affect  Her behavior is normal    Nursing note and vitals reviewed  I have personally reviewed pertinent films in PACS and my interpretation is as follows: Three-view right knee x-ray taken on March 3, 2020 demonstrates no acute fracture dislocation  Slight medial joint line narrowing  Three-view left knee x-ray taken on March 3, 2020 demonstrates no acute fracture dislocation  Slight medial joint line narrowing  Lumbar spine x-ray taken on March 3, 2020 demonstrates no acute fracture dislocation  Small calcified uterine fibroid is visible

## 2020-06-26 ENCOUNTER — TRANSCRIBE ORDERS (OUTPATIENT)
Dept: ADMINISTRATIVE | Facility: HOSPITAL | Age: 65
End: 2020-06-26

## 2020-06-26 DIAGNOSIS — M79.605 LEFT LEG PAIN: Primary | ICD-10-CM

## 2020-06-30 ENCOUNTER — HOSPITAL ENCOUNTER (OUTPATIENT)
Dept: RADIOLOGY | Facility: HOSPITAL | Age: 65
Discharge: HOME | End: 2020-06-30

## 2020-06-30 DIAGNOSIS — M79.605 LEFT LEG PAIN: ICD-10-CM

## 2020-07-24 ENCOUNTER — OFFICE VISIT (OUTPATIENT)
Dept: RHEUMATOLOGY | Facility: CLINIC | Age: 65
End: 2020-07-24
Payer: COMMERCIAL

## 2020-07-24 VITALS
HEIGHT: 62 IN | SYSTOLIC BLOOD PRESSURE: 137 MMHG | BODY MASS INDEX: 30.14 KG/M2 | TEMPERATURE: 97.3 F | WEIGHT: 163.8 LBS | HEART RATE: 77 BPM | DIASTOLIC BLOOD PRESSURE: 80 MMHG

## 2020-07-24 DIAGNOSIS — Z87.39 HISTORY OF FIBROMYALGIA: ICD-10-CM

## 2020-07-24 DIAGNOSIS — G89.29 CHRONIC MIDLINE LOW BACK PAIN WITHOUT SCIATICA: ICD-10-CM

## 2020-07-24 DIAGNOSIS — M54.50 CHRONIC MIDLINE LOW BACK PAIN WITHOUT SCIATICA: ICD-10-CM

## 2020-07-24 DIAGNOSIS — M25.571 CHRONIC PAIN OF BOTH ANKLES: Primary | ICD-10-CM

## 2020-07-24 DIAGNOSIS — M25.572 CHRONIC PAIN OF BOTH ANKLES: Primary | ICD-10-CM

## 2020-07-24 DIAGNOSIS — K52.839 MICROSCOPIC COLITIS, UNSPECIFIED MICROSCOPIC COLITIS TYPE: ICD-10-CM

## 2020-07-24 DIAGNOSIS — G89.29 CHRONIC PAIN OF BOTH ANKLES: Primary | ICD-10-CM

## 2020-07-24 PROCEDURE — 99205 OFFICE O/P NEW HI 60 MIN: CPT | Performed by: INTERNAL MEDICINE

## 2020-07-24 NOTE — PROGRESS NOTES
Assessment and Plan:   Ms Santiago Yañez is a 29-year-old female with history significant for microscopic colitis, cardiac disease, chronic kidney disease and fibromyalgia, who presents for further evaluation of bilateral ankle pain and swelling  She is referred by Dr Elias Arzola for a rheumatology consult  Lucho Lundbergfrancisco presents today for further evaluation of bilateral ankle pain and swelling that she has been experiencing since July 2019  She has had an extensive orthopedics and cardiology evaluation done so far which has been unrevealing, and there were concerns that her symptoms may be related to arthritis  She has had MRIs of her bilateral ankles done which apparently only showed soft tissue swelling  Given the lower extremity joint involvement, if we are assessing for a rheumatological disorder, I would want to further evaluate for a spondyloarthropathy or conditions such as sarcoidosis which can present with bilateral ankle arthritis  She does not describe any other features concerning for sarcoidosis but I will obtain a chest x-ray to further evaluate  In regards to the spondyloarthropathy possibility, she does have a history of microscopic colitis that occurred 4 years ago, without any symptoms concerning for ongoing inflammatory bowel disease, but I will keep this in mind  She also describes a history of chronic low back pain, but it is uncertain whether she has inflammatory features, and again has not had this pain occur in about 5 months  We will complete additional x-rays and labs to further evaluate  - It is reassuring to note that her symptoms appear to have resolved spontaneously as her physical examination is unrevealing today  There was also a question if the atorvastatin may be contributing to her symptoms, and it seems like with a reduction in the dose the pain and swelling did diminish      - I will contact her with the results to determine if any acute interventions are indicated, otherwise as she is asymptomatic at this time we will continue to monitor for a recurrence of her complaints  I will see her back for a routine follow-up in 3 months  Plan:  Diagnoses and all orders for this visit:    Chronic pain of both ankles  -     CBC and differential; Future  -     Comprehensive metabolic panel; Future  -     Chronic Hepatitis Panel; Future  -     C-reactive protein; Future  -     Sedimentation rate, automated; Future  -     RF Screen w/ Reflex to Titer; Future  -     Cyclic citrul peptide antibody, IgG; Future  -     Sjogren's Antibodies; Future  -     Uric acid; Future  -     XR sacroiliac joints 3+ views; Future  -     XR chest pa & lateral; Future  -     Angiotensin converting enzyme; Future  -     HLA-B27 antigen; Future    Chronic midline low back pain without sciatica  -     CBC and differential; Future  -     Comprehensive metabolic panel; Future  -     Chronic Hepatitis Panel; Future  -     C-reactive protein; Future  -     Sedimentation rate, automated; Future  -     RF Screen w/ Reflex to Titer; Future  -     Cyclic citrul peptide antibody, IgG; Future  -     Sjogren's Antibodies; Future  -     Uric acid; Future  -     XR sacroiliac joints 3+ views; Future  -     XR chest pa & lateral; Future  -     Angiotensin converting enzyme; Future  -     HLA-B27 antigen; Future    History of fibromyalgia    Microscopic colitis, unspecified microscopic colitis type  Comments:  One flare up, 4 years ago  Orders:  -     HLA-B27 antigen; Future      I have personally reviewed pertinent films in PACS of the left foot XR which is unremarkable  Activities as tolerated    Diet: low carb/low fat, more greens/vegetables, adequate hydration  Exercise: try to maintain a low impact exercise regimen as much as possible  Walk for 30 minutes a day for at least 3 days a week    Encouraged to maintain good sleep hygiene  Continue other medications as prescribed by PCP and other specialists        RTC in 3 months  HPI  Ms Kelly Whitt is a 61-year-old female with history significant for microscopic colitis, cardiac disease, chronic kidney disease and fibromyalgia, who presents for further evaluation of bilateral ankle pain and swelling  She is referred by Dr Lidia Thomson for a rheumatology consult  Patient reports she started to experience symptoms in July 2019 consisting of pain but with significant swelling over the lateral malleolus of the right ankle and her left ankle as well as over the top of her left foot  She has seen both Orthopedics and her cardiologist for the symptoms so far  She apparently had MRIs done of both of her ankles which only showed soft tissue swelling but were otherwise unremarkable  Initially it was thought that her symptoms could be related to tendinitis or a fracture, and she was advised to start physical therapy which she was unable to do so because of the COVID-19 situation  Due to concerns for a fracture she was placed in a boot of her left leg which did help with the swelling, but after the MRI was done this ruled out the possibility of a fracture  She was seen by her cardiologist and had arterial and venous lower extremity studies done which were apparently normal   Lastly there was a consideration that her swelling could be secondary to the atorvastatin at 40 mg once daily, so the dose was recently lowered to 20 mg once daily  She states in the past 10 days or so the swelling has significantly reduced after making this medication change  She states depending on her footwear, when she wears sandals she may still get a little bit of swelling on the top of her left foot, but for the most part the swelling has significantly subsided  While walking she may experience some pain at the regions of her Achilles tendon, but not otherwise  Of note, she has also noticed swelling of her left foot 2nd through 5th digits when the ankle is swollen      She mentions a few years ago she started to experience pain of her bilateral knees while biking, and has avoided doing this  She otherwise does not have pain in her knees  She denies any pain of her hands, wrists, elbows, shoulders or hips  Apart from the ankle swelling, she will also experience swelling of her fingers first thing in the morning which resolves within 30 minutes  She does experience morning stiffness of her entire body which also takes 30 minutes to improve  She does not really take any over-the-counter medications for her symptoms, neither for the ankle pain/swelling  She reports a history of chronic low back pain for which she has previously seen Dr Deric Glasgow and was advised that she has lumbar degenerative arthritis  She did not continue follow-up with him  She reports in the past 5 months or so she has actually not experienced the back pain, and in view of this she is unable to recall the specifics such as if the pain worsens/improves with activities or rest   She does recall that it would wake her up from sleep at night and feel like a sharp stabbing pain through her entire pelvic region with a heaviness sensation  She did have a gynecological exam done which was unrevealing  She reports a diagnosis of microscopic colitis that was diagnosed 4 years ago, with one flare-up  She declined going on steroids at that time and states that the flare-up resolved spontaneously  She has not had any issues since then  She denies fevers, unintentional weight loss, skin rash, psoriasis, inflammatory eye disease, mouth/nose ulcers, swollen glands, chest pain, cough (reports she has an occasional smoker's cough), shortness of breath, abdominal pain, vomiting, diarrhea or blood in stools  She does report a history of rheumatoid arthritis and osteoarthritis in her grandmother and aunt, as well as inflammatory bowel disease in her cousin and nephew, as well as possibly in her son      She does mention seeing her primary care physician and having arthritis workup and lupus testing done, which was apparently unremarkable, but I do not have any results available to me  The following portions of the patient's history were reviewed and updated as appropriate: allergies, current medications, past family history, past medical history, past social history, past surgical history and problem list       Review of Systems  Constitutional: Negative for weight change, fevers, chills, night sweats, fatigue  ENT/Mouth: Negative for hearing changes, ear pain, nasal congestion, sinus pain, hoarseness, sore throat, rhinorrhea, swallowing difficulty  Eyes: Negative for pain, redness, discharge, vision changes  Cardiovascular: Negative for chest pain, SOB, palpitations  Respiratory: Negative for cough, sputum, wheezing, dyspnea  Gastrointestinal: Negative for nausea, vomiting, diarrhea, constipation, pain, heartburn  Genitourinary: Negative for dysuria, urinary frequency, hematuria  Musculoskeletal: As per HPI  Skin: Negative for skin rash, color changes  Neuro: Negative for weakness, numbness, tingling, loss of consciousness  Psych: Negative for anxiety, depression  Heme/Lymph: Negative for easy bruising, bleeding, lymphadenopathy          Past Medical History:   Diagnosis Date    Acid reflux     Anxiety     Arthritis     [facility]     skin on chin    Cardiac disease     Chronic kidney disease     (R) kidney smaller than (L),  kidney stones    Chronic UTI (urinary tract infection)     Colitis     microscopic    COPD (chronic obstructive pulmonary disease) (HCC)     mild, recent dx    Depression     Epidermal cyst of face     last assessed 10-    [facility] ) 09/20/2018    [place] (hard of hearing)     deaf (L) ear, decreased hearing (R) ear    Hyperlipemia     diet controlled    Hyperlipidemia     IBS (irritable bowel syndrome)     Malignant neoplasm of skin     Meniere disease     Numbness and tingling of left side of face     Uterine leiomyoma        Past Surgical History:   Procedure Laterality Date    CARDIAC CATHETERIZATION      9/21/18 [facility] - severe CAD of the LAD and diagonal artery    COLONOSCOPY N/A 11/4/2016    Procedure: COLONOSCOPY;  Surgeon: John Whyte MD;  Location: [place] [address] GI LAB; Service:     ERCP      ESOPHAGOGASTRODUODENOSCOPY N/A 11/4/2016    Procedure: ESOPHAGOGASTRODUODENOSCOPY (EGD); Surgeon: John Whyte MD;  Location: [facility] GI LAB; Service:     EYE SURGERY      [facility] XCAPSL [facility]C RMVL INSJ IO LENS PROSTH W/O ECP Left 3/7/2016    Procedure: EXTRACTION EXTRACAPSULAR CATARACT PHACO INTRAOCULAR LENS (IOL);   Surgeon: Adilia Gutierrez MD;  Location: [facility] MAIN OR;  Service: Ophthalmology    SKIN CANCER EXCISION      excision skin cancer on chin       Social History     Socioeconomic History    Marital status: Single     Spouse name: Not on file    Number of children: Not on file    Years of education: Not on file    Highest education level: Not on file   Occupational History    Not on file   Social Needs    Financial resource strain: Not on file    Food insecurity:     Worry: Not on file     Inability: Not on file    Transportation needs:     Medical: Not on file     Non-medical: Not on file   Tobacco Use    Smoking status: Current Every Day Smoker     Packs/day: 0 50     Years: 43 00     Pack years: 21 50     Types: Cigarettes    Smokeless tobacco: Never Used    Tobacco comment: 45+ years per allscripts   Substance and Sexual Activity    Alcohol use: No    Drug use: No    Sexual activity: Not on file   Lifestyle    Physical activity:     Days per week: Not on file     Minutes per session: Not on file    Stress: Not on file   Relationships    Social connections:     Talks on phone: Not on file     Gets together: Not on file     Attends Alevism service: Not on file     Active member of club or organization: Not on file     Attends meetings of clubs or organizations: Not on file     Relationship status: Not on file    Intimate partner violence:     Fear of current or ex partner: Not on file     Emotionally abused: Not on file     Physically abused: Not on file     Forced sexual activity: Not on file   Other Topics Concern    Not on file   Social History Narrative    Always uses [employer]lbelt       Family History   Problem Relation Age of Onset    Heart disease Mother         cardiac disorder    Lung cancer Mother     COPD Mother     Colonic polyp Sister     Thyroid cancer Sister     Osteoporosis Family     Colon cancer Family     Heart disease Family         cardiac disorder    Heart disease Family         cardiac disorder    Stroke Family     Arthritis Family     Colon cancer Family     Lung cancer Family     Cancer Family         anterior wall of urinanry bladder    Breast cancer Family     Other Family         brain tumor    Breast cancer Cousin     Brain cancer Father     Skin cancer Brother     Skin cancer Brother     Diabetes Son     Neuropathy Son     No Known Problems Son        Allergies   Allergen Reactions    Erythromycin Hives and Itching    Nizatidine Other (See Comments)     Severe chest pain  Severe chest pain    Penicillins Hives and Itching     Rash, hives    Wellbutrin [Bupropion] Other (See Comments)     Elevated  / 110    Ciprofloxacin Hcl Hives    Fish-Derived Products GI Intolerance     Tuna fish & all shell fish    Other Hives     allegic to all cillans and all mycins    Ampicillin     Benadryl [Diphenhydramine Hcl] Other (See Comments)     unknown    Ciprofloxacin      Severe hives, sob?  Clindamycin     Diphenhydramine      Facial swelling ?     Doxycycline     Lovastatin      Muscle aches  Muscle aches    Nickel Other (See Comments)     Throat swelling/itching    Shellfish-Derived Products GI Intolerance     Pt unsure       Current Outpatient Medications:     aspirin 81 mg chewable tablet, Chew 81 mg daily, Disp: , Rfl:     atorvastatin (LIPITOR) 10 mg tablet, Take 20 mg by mouth daily at bedtime , Disp: , Rfl: 0    ezetimibe (ZETIA) 10 mg tablet, Take 10 mg by mouth daily, Disp: , Rfl:     nicotine (NICOTROL) 10 MG inhaler, Inhale 1 puff as needed for smoking cessation Use 1 cartridge per hour up to 10 a day as needed (Patient not taking: Reported on 2/27/2020), Disp: 168 each, Rfl: 3      Objective:    Vitals:    07/24/20 0841   BP: 137/80   BP Location: Right arm   Patient Position: Sitting   Cuff Size: Extra-Large   Pulse: 77   Temp: (!) 97 3 °F (36 3 °C)   Weight: 74 3 kg (163 lb 12 8 oz)   Height: 5' 2" (1 575 m)       Physical Exam  General: Well appearing, well nourished, in no distress  Oriented x 3, normal mood and affect  Ambulating without difficulty  Skin: Good turgor, no rash, unusual bruising or prominent lesions  She does have mosquito bites present on her lower extremities  Hair: Normal texture and distribution  Nails: Normal color, no deformities  HEENT:  Head: Normocephalic, atraumatic  Eyes: Conjunctiva clear, sclera non-icteric, EOM intact  Nose: No external lesions, mucosa non-inflamed  Neck: Supple, thyroid non-enlarged and non-tender  No lymphadenopathy  Extremities: No amputations or deformities, cyanosis, edema  Musculoskeletal:   Hands, wrists, elbows and shoulders - unremarkable  Knees and feet - unremarkable  Ankles - there does not appear to be any significant soft tissue swelling of her ankles today  There is no tenderness or restriction in range of motion  Neurologic: Alert and oriented  No focal neurological deficits appreciated  Psychiatric: Normal mood and affect  ANTOLIN Lockhart    Rheumatology

## 2020-08-05 LAB
ACE SERPL-CCNC: 68 U/L (ref 14–82)
ALBUMIN SERPL-MCNC: 4.6 G/DL (ref 3.8–4.8)
ALBUMIN/GLOB SERPL: 2.3 {RATIO} (ref 1.2–2.2)
ALP SERPL-CCNC: 110 IU/L (ref 39–117)
ALT SERPL-CCNC: 36 IU/L (ref 0–32)
AST SERPL-CCNC: 31 IU/L (ref 0–40)
BASOPHILS # BLD AUTO: 0.1 X10E3/UL (ref 0–0.2)
BASOPHILS NFR BLD AUTO: 1 %
BILIRUB SERPL-MCNC: 0.4 MG/DL (ref 0–1.2)
BUN SERPL-MCNC: 9 MG/DL (ref 8–27)
BUN/CREAT SERPL: 12 (ref 12–28)
CALCIUM SERPL-MCNC: 9.4 MG/DL (ref 8.7–10.3)
CCP IGA+IGG SERPL IA-ACNC: 2 UNITS (ref 0–19)
CHLORIDE SERPL-SCNC: 104 MMOL/L (ref 96–106)
CO2 SERPL-SCNC: 23 MMOL/L (ref 20–29)
CREAT SERPL-MCNC: 0.75 MG/DL (ref 0.57–1)
CRP SERPL-MCNC: 2 MG/L (ref 0–10)
ENA SS-A AB SER-ACNC: <0.2 AI (ref 0–0.9)
ENA SS-B AB SER-ACNC: <0.2 AI (ref 0–0.9)
EOSINOPHIL # BLD AUTO: 0.2 X10E3/UL (ref 0–0.4)
EOSINOPHIL NFR BLD AUTO: 2 %
ERYTHROCYTE [DISTWIDTH] IN BLOOD BY AUTOMATED COUNT: 12.6 % (ref 11.7–15.4)
ERYTHROCYTE [SEDIMENTATION RATE] IN BLOOD BY WESTERGREN METHOD: 17 MM/HR (ref 0–40)
GLOBULIN SER-MCNC: 2 G/DL (ref 1.5–4.5)
GLUCOSE SERPL-MCNC: 88 MG/DL (ref 65–99)
HAV AB SER QL IA: NEGATIVE
HBV CORE AB SERPL QL IA: NEGATIVE
HBV SURFACE AB SER QL: NON REACTIVE
HBV SURFACE AG SERPL QL IA: NEGATIVE
HCT VFR BLD AUTO: 45.3 % (ref 34–46.6)
HCV AB S/CO SERPL IA: <0.1 S/CO RATIO (ref 0–0.9)
HGB BLD-MCNC: 14.6 G/DL (ref 11.1–15.9)
HLA-B27 QL NAA+PROBE: NEGATIVE
IMM GRANULOCYTES # BLD: 0.1 X10E3/UL (ref 0–0.1)
IMM GRANULOCYTES NFR BLD: 1 %
LYMPHOCYTES # BLD AUTO: 1.7 X10E3/UL (ref 0.7–3.1)
LYMPHOCYTES NFR BLD AUTO: 18 %
MCH RBC QN AUTO: 29.3 PG (ref 26.6–33)
MCHC RBC AUTO-ENTMCNC: 32.2 G/DL (ref 31.5–35.7)
MCV RBC AUTO: 91 FL (ref 79–97)
MONOCYTES # BLD AUTO: 0.7 X10E3/UL (ref 0.1–0.9)
MONOCYTES NFR BLD AUTO: 7 %
NEUTROPHILS # BLD AUTO: 7 X10E3/UL (ref 1.4–7)
NEUTROPHILS NFR BLD AUTO: 71 %
PLATELET # BLD AUTO: 224 X10E3/UL (ref 150–450)
POTASSIUM SERPL-SCNC: 4.6 MMOL/L (ref 3.5–5.2)
PROT SERPL-MCNC: 6.6 G/DL (ref 6–8.5)
RBC # BLD AUTO: 4.99 X10E6/UL (ref 3.77–5.28)
RHEUMATOID FACT SERPL-ACNC: 12.8 IU/ML (ref 0–13.9)
SL AMB COMMENTS: NORMAL
SL AMB EGFR AFRICAN AMERICAN: 99 ML/MIN/1.73
SL AMB EGFR NON AFRICAN AMERICAN: 86 ML/MIN/1.73
SODIUM SERPL-SCNC: 141 MMOL/L (ref 134–144)
URATE SERPL-MCNC: 3.4 MG/DL (ref 2.5–7.1)
WBC # BLD AUTO: 9.8 X10E3/UL (ref 3.4–10.8)

## 2020-08-10 ENCOUNTER — HOSPITAL ENCOUNTER (OUTPATIENT)
Dept: RADIOLOGY | Facility: HOSPITAL | Age: 65
Discharge: HOME | End: 2020-08-10
Attending: INTERNAL MEDICINE
Payer: COMMERCIAL

## 2020-08-10 ENCOUNTER — TRANSCRIBE ORDERS (OUTPATIENT)
Dept: ADMINISTRATIVE | Facility: HOSPITAL | Age: 65
End: 2020-08-10

## 2020-08-10 DIAGNOSIS — G89.29 CHRONIC MIDLINE LOW BACK PAIN WITHOUT SCIATICA: ICD-10-CM

## 2020-08-10 DIAGNOSIS — G89.29 CHRONIC PAIN OF BOTH ANKLES: ICD-10-CM

## 2020-08-10 DIAGNOSIS — M25.572 CHRONIC PAIN OF BOTH ANKLES: ICD-10-CM

## 2020-08-10 DIAGNOSIS — M25.571 CHRONIC PAIN OF BOTH ANKLES: ICD-10-CM

## 2020-08-10 DIAGNOSIS — M54.50 CHRONIC MIDLINE LOW BACK PAIN WITHOUT SCIATICA: ICD-10-CM

## 2020-08-10 PROCEDURE — 72202 X-RAY EXAM SI JOINTS 3/> VWS: CPT

## 2020-08-10 PROCEDURE — 71046 X-RAY EXAM CHEST 2 VIEWS: CPT

## 2020-08-13 ENCOUNTER — TELEPHONE (OUTPATIENT)
Dept: RHEUMATOLOGY | Facility: CLINIC | Age: 65
End: 2020-08-13

## 2020-08-13 NOTE — TELEPHONE ENCOUNTER
----- Message from Tomi Olson MD sent at 8/13/2020 10:22 AM EDT -----  Please let patient know the labs were all normal   The x-ray of her sacroiliac joints was normal   The chest x-ray showed a nodule which had increased in size from previously  I would like her to contact her primary care physician to see what additional evaluation needs to be done for the lung nodule  Thanks

## 2020-08-17 ENCOUNTER — TRANSCRIBE ORDERS (OUTPATIENT)
Dept: ADMINISTRATIVE | Facility: HOSPITAL | Age: 65
End: 2020-08-17

## 2020-08-17 DIAGNOSIS — R91.8 LUNG MASS: Primary | ICD-10-CM

## 2020-08-17 DIAGNOSIS — Z12.31 SCREENING MAMMOGRAM FOR HIGH-RISK PATIENT: Primary | ICD-10-CM

## 2020-08-24 ENCOUNTER — HOSPITAL ENCOUNTER (OUTPATIENT)
Dept: RADIOLOGY | Facility: HOSPITAL | Age: 65
Discharge: HOME | End: 2020-08-24
Payer: COMMERCIAL

## 2020-08-24 DIAGNOSIS — R91.8 LUNG MASS: ICD-10-CM

## 2020-08-24 PROCEDURE — 71250 CT THORAX DX C-: CPT

## 2020-08-24 PROCEDURE — G1004 CDSM NDSC: HCPCS

## 2020-10-06 ENCOUNTER — HOSPITAL ENCOUNTER (OUTPATIENT)
Dept: RADIOLOGY | Facility: HOSPITAL | Age: 65
Discharge: HOME | End: 2020-10-06
Payer: COMMERCIAL

## 2020-10-06 VITALS — WEIGHT: 164 LBS | BODY MASS INDEX: 30.18 KG/M2 | HEIGHT: 62 IN

## 2020-10-06 DIAGNOSIS — Z12.31 SCREENING MAMMOGRAM FOR HIGH-RISK PATIENT: ICD-10-CM

## 2020-10-06 PROCEDURE — 77067 SCR MAMMO BI INCL CAD: CPT

## 2020-10-06 PROCEDURE — 77063 BREAST TOMOSYNTHESIS BI: CPT

## 2020-10-12 ENCOUNTER — OFFICE VISIT (OUTPATIENT)
Dept: PULMONOLOGY | Facility: MEDICAL CENTER | Age: 65
End: 2020-10-12
Payer: COMMERCIAL

## 2020-10-12 VITALS
OXYGEN SATURATION: 98 % | RESPIRATION RATE: 16 BRPM | TEMPERATURE: 98.3 F | DIASTOLIC BLOOD PRESSURE: 70 MMHG | SYSTOLIC BLOOD PRESSURE: 140 MMHG | WEIGHT: 164 LBS | HEIGHT: 62 IN | BODY MASS INDEX: 30.18 KG/M2 | HEART RATE: 78 BPM

## 2020-10-12 DIAGNOSIS — R06.00 DYSPNEA ON EXERTION: ICD-10-CM

## 2020-10-12 DIAGNOSIS — R91.8 LUNG NODULES: Primary | ICD-10-CM

## 2020-10-12 DIAGNOSIS — J43.2 CENTRILOBULAR EMPHYSEMA (HCC): ICD-10-CM

## 2020-10-12 DIAGNOSIS — G47.10 HYPERSOMNIA: ICD-10-CM

## 2020-10-12 DIAGNOSIS — F17.210 CIGARETTE NICOTINE DEPENDENCE WITHOUT COMPLICATION: ICD-10-CM

## 2020-10-12 PROCEDURE — 99214 OFFICE O/P EST MOD 30 MIN: CPT | Performed by: INTERNAL MEDICINE

## 2020-10-12 RX ORDER — ATORVASTATIN CALCIUM 20 MG/1
20 TABLET, FILM COATED ORAL DAILY
COMMUNITY
Start: 2020-09-23 | End: 2021-02-22

## 2020-10-12 RX ORDER — EVOLOCUMAB 140 MG/ML
INJECTION, SOLUTION SUBCUTANEOUS
COMMUNITY
Start: 2020-09-09 | End: 2021-02-22

## 2020-10-12 RX ORDER — TRIAMCINOLONE ACETONIDE 1 MG/G
CREAM TOPICAL
COMMUNITY
Start: 2020-09-11 | End: 2021-02-22

## 2020-12-14 ENCOUNTER — HOSPITAL ENCOUNTER (EMERGENCY)
Facility: HOSPITAL | Age: 65
Discharge: HOME | End: 2020-12-14
Attending: EMERGENCY MEDICINE | Admitting: EMERGENCY MEDICINE
Payer: COMMERCIAL

## 2020-12-14 ENCOUNTER — APPOINTMENT (EMERGENCY)
Dept: RADIOLOGY | Facility: HOSPITAL | Age: 65
End: 2020-12-14
Payer: COMMERCIAL

## 2020-12-14 VITALS
OXYGEN SATURATION: 98 % | DIASTOLIC BLOOD PRESSURE: 68 MMHG | HEART RATE: 60 BPM | SYSTOLIC BLOOD PRESSURE: 120 MMHG | TEMPERATURE: 98.1 F | BODY MASS INDEX: 29.45 KG/M2 | WEIGHT: 161 LBS | RESPIRATION RATE: 19 BRPM

## 2020-12-14 DIAGNOSIS — R07.9 CHEST PAIN: Primary | ICD-10-CM

## 2020-12-14 LAB
ALBUMIN SERPL BCP-MCNC: 3.6 G/DL (ref 3.5–5)
ALP SERPL-CCNC: 136 U/L (ref 46–116)
ALT SERPL W P-5'-P-CCNC: 32 U/L (ref 12–78)
ANION GAP SERPL CALCULATED.3IONS-SCNC: 11 MMOL/L (ref 4–13)
APTT PPP: 32 SECONDS (ref 23–37)
AST SERPL W P-5'-P-CCNC: 24 U/L (ref 5–45)
ATRIAL RATE: 110 BPM
BASOPHILS # BLD AUTO: 0.07 THOUSANDS/ΜL (ref 0–0.1)
BASOPHILS NFR BLD AUTO: 1 % (ref 0–1)
BILIRUB SERPL-MCNC: 0.3 MG/DL (ref 0.2–1)
BUN SERPL-MCNC: 13 MG/DL (ref 5–25)
CALCIUM SERPL-MCNC: 9.3 MG/DL (ref 8.3–10.1)
CHLORIDE SERPL-SCNC: 104 MMOL/L (ref 100–108)
CO2 SERPL-SCNC: 26 MMOL/L (ref 21–32)
CREAT SERPL-MCNC: 1.12 MG/DL (ref 0.6–1.3)
EOSINOPHIL # BLD AUTO: 0.27 THOUSAND/ΜL (ref 0–0.61)
EOSINOPHIL NFR BLD AUTO: 3 % (ref 0–6)
ERYTHROCYTE [DISTWIDTH] IN BLOOD BY AUTOMATED COUNT: 12.8 % (ref 11.6–15.1)
GFR SERPL CREATININE-BSD FRML MDRD: 52 ML/MIN/1.73SQ M
GLUCOSE SERPL-MCNC: 123 MG/DL (ref 65–140)
HCT VFR BLD AUTO: 44.4 % (ref 34.8–46.1)
HGB BLD-MCNC: 14.1 G/DL (ref 11.5–15.4)
IMM GRANULOCYTES # BLD AUTO: 0.04 THOUSAND/UL (ref 0–0.2)
IMM GRANULOCYTES NFR BLD AUTO: 0 % (ref 0–2)
INR PPP: 0.92 (ref 0.84–1.19)
LYMPHOCYTES # BLD AUTO: 2.15 THOUSANDS/ΜL (ref 0.6–4.47)
LYMPHOCYTES NFR BLD AUTO: 20 % (ref 14–44)
MCH RBC QN AUTO: 29.4 PG (ref 26.8–34.3)
MCHC RBC AUTO-ENTMCNC: 31.8 G/DL (ref 31.4–37.4)
MCV RBC AUTO: 93 FL (ref 82–98)
MONOCYTES # BLD AUTO: 0.92 THOUSAND/ΜL (ref 0.17–1.22)
MONOCYTES NFR BLD AUTO: 8 % (ref 4–12)
NEUTROPHILS # BLD AUTO: 7.55 THOUSANDS/ΜL (ref 1.85–7.62)
NEUTS SEG NFR BLD AUTO: 68 % (ref 43–75)
NRBC BLD AUTO-RTO: 0 /100 WBCS
P AXIS: 54 DEGREES
PLATELET # BLD AUTO: 210 THOUSANDS/UL (ref 149–390)
PMV BLD AUTO: 9.8 FL (ref 8.9–12.7)
POTASSIUM SERPL-SCNC: 3.6 MMOL/L (ref 3.5–5.3)
PR INTERVAL: 158 MS
PROT SERPL-MCNC: 6.8 G/DL (ref 6.4–8.2)
PROTHROMBIN TIME: 12.3 SECONDS (ref 11.6–14.5)
QRS AXIS: 32 DEGREES
QRSD INTERVAL: 82 MS
QT INTERVAL: 342 MS
QTC INTERVAL: 462 MS
RBC # BLD AUTO: 4.8 MILLION/UL (ref 3.81–5.12)
SODIUM SERPL-SCNC: 141 MMOL/L (ref 136–145)
T WAVE AXIS: 41 DEGREES
TROPONIN I SERPL-MCNC: <0.02 NG/ML
TROPONIN I SERPL-MCNC: <0.02 NG/ML
VENTRICULAR RATE: 110 BPM
WBC # BLD AUTO: 11 THOUSAND/UL (ref 4.31–10.16)

## 2020-12-14 PROCEDURE — 85610 PROTHROMBIN TIME: CPT | Performed by: EMERGENCY MEDICINE

## 2020-12-14 PROCEDURE — 85025 COMPLETE CBC W/AUTO DIFF WBC: CPT | Performed by: EMERGENCY MEDICINE

## 2020-12-14 PROCEDURE — 84484 ASSAY OF TROPONIN QUANT: CPT | Performed by: EMERGENCY MEDICINE

## 2020-12-14 PROCEDURE — 93010 ELECTROCARDIOGRAM REPORT: CPT | Performed by: INTERNAL MEDICINE

## 2020-12-14 PROCEDURE — 99285 EMERGENCY DEPT VISIT HI MDM: CPT

## 2020-12-14 PROCEDURE — 85730 THROMBOPLASTIN TIME PARTIAL: CPT | Performed by: EMERGENCY MEDICINE

## 2020-12-14 PROCEDURE — 36415 COLL VENOUS BLD VENIPUNCTURE: CPT | Performed by: EMERGENCY MEDICINE

## 2020-12-14 PROCEDURE — 99285 EMERGENCY DEPT VISIT HI MDM: CPT | Performed by: EMERGENCY MEDICINE

## 2020-12-14 PROCEDURE — 93005 ELECTROCARDIOGRAM TRACING: CPT

## 2020-12-14 PROCEDURE — 80053 COMPREHEN METABOLIC PANEL: CPT | Performed by: EMERGENCY MEDICINE

## 2020-12-14 PROCEDURE — 71045 X-RAY EXAM CHEST 1 VIEW: CPT

## 2020-12-14 RX ORDER — ACETAMINOPHEN 325 MG/1
650 TABLET ORAL ONCE
Status: DISCONTINUED | OUTPATIENT
Start: 2020-12-14 | End: 2020-12-14

## 2020-12-14 RX ORDER — NICOTINE 21 MG/24HR
21 PATCH, TRANSDERMAL 24 HOURS TRANSDERMAL ONCE
Status: DISCONTINUED | OUTPATIENT
Start: 2020-12-14 | End: 2020-12-14 | Stop reason: HOSPADM

## 2020-12-14 RX ORDER — NITROGLYCERIN 0.4 MG/1
0.4 TABLET SUBLINGUAL ONCE
Status: COMPLETED | OUTPATIENT
Start: 2020-12-14 | End: 2020-12-14

## 2020-12-14 RX ORDER — NICOTINE 21 MG/24HR
21 PATCH, TRANSDERMAL 24 HOURS TRANSDERMAL ONCE
Status: DISCONTINUED | OUTPATIENT
Start: 2020-12-14 | End: 2020-12-14

## 2020-12-14 RX ORDER — ACETAMINOPHEN 325 MG/1
650 TABLET ORAL ONCE
Status: COMPLETED | OUTPATIENT
Start: 2020-12-14 | End: 2020-12-14

## 2020-12-14 RX ORDER — ASPIRIN 81 MG/1
324 TABLET, CHEWABLE ORAL ONCE
Status: COMPLETED | OUTPATIENT
Start: 2020-12-14 | End: 2020-12-14

## 2020-12-14 RX ADMIN — ASPIRIN 81 MG CHEWABLE TABLET 243 MG: 81 TABLET CHEWABLE at 03:04

## 2020-12-14 RX ADMIN — NICOTINE 21 MG: 21 PATCH, EXTENDED RELEASE TRANSDERMAL at 05:11

## 2020-12-14 RX ADMIN — ACETAMINOPHEN 650 MG: 325 TABLET, FILM COATED ORAL at 05:12

## 2020-12-14 RX ADMIN — NITROGLYCERIN 0.4 MG: 0.4 TABLET SUBLINGUAL at 03:06

## 2021-01-21 ENCOUNTER — HOSPITAL ENCOUNTER (OUTPATIENT)
Dept: SLEEP CENTER | Facility: CLINIC | Age: 66
Discharge: HOME | End: 2021-01-21
Payer: COMMERCIAL

## 2021-01-21 DIAGNOSIS — G47.10 HYPERSOMNIA: ICD-10-CM

## 2021-01-21 PROCEDURE — G0399 HOME SLEEP TEST/TYPE 3 PORTA: HCPCS

## 2021-01-21 PROCEDURE — 95806 SLEEP STUDY UNATT&RESP EFFT: CPT | Performed by: INTERNAL MEDICINE

## 2021-01-22 NOTE — PROGRESS NOTES
Home Sleep Study Documentation    Pre-Sleep Home Study:    Set-up and instructions performed by: DEMETRIA Ingram, LINDA    Technician performed demonstration for Patient: yes    Return demonstration performed by Patient: yes    Written instructions provided to Patient: yes    Patient signed consent form: yes        Post-Sleep Home Study:    Additional comments by Patient: none    Home Sleep Study Failed:no:    Failure reason: N/A    Reported or Detected: N/A    Scored by: MILDRED Ramsay, LINDA

## 2021-01-26 NOTE — ASSESSMENT & PLAN NOTE
Patient had diarrhea for about a month but reports having constipation in the past couple of days  Consider microscopic colitis as it was proved on biopsies couple of years ago    Should rule out infection     -check the stool studies including C diff, cultures    -discussed with her about colonoscopy but she was told by her cardiologist not to have any procedures at this time     -if the stool studies are negative and if she continues with diarrhea we can consider to put her on Entocort for possible microscopic colitis     -advised her to take the probiotics regularly no

## 2021-01-29 ENCOUNTER — TELEPHONE (OUTPATIENT)
Dept: SLEEP CENTER | Facility: CLINIC | Age: 66
End: 2021-01-29

## 2021-01-29 NOTE — TELEPHONE ENCOUNTER
Left message for the patient that sleep study results are available   Patient to follow up with Dr Naresh Reddy

## 2021-02-11 ENCOUNTER — HOSPITAL ENCOUNTER (OUTPATIENT)
Dept: PULMONOLOGY | Facility: HOSPITAL | Age: 66
Discharge: HOME | End: 2021-02-11
Attending: INTERNAL MEDICINE
Payer: COMMERCIAL

## 2021-02-11 DIAGNOSIS — R06.00 DYSPNEA ON EXERTION: ICD-10-CM

## 2021-02-11 DIAGNOSIS — J43.2 CENTRILOBULAR EMPHYSEMA (HCC): ICD-10-CM

## 2021-02-11 PROCEDURE — 94010 BREATHING CAPACITY TEST: CPT | Performed by: INTERNAL MEDICINE

## 2021-02-11 PROCEDURE — 94726 PLETHYSMOGRAPHY LUNG VOLUMES: CPT | Performed by: INTERNAL MEDICINE

## 2021-02-11 PROCEDURE — 94010 BREATHING CAPACITY TEST: CPT

## 2021-02-11 PROCEDURE — 94729 DIFFUSING CAPACITY: CPT | Performed by: INTERNAL MEDICINE

## 2021-02-11 PROCEDURE — 94726 PLETHYSMOGRAPHY LUNG VOLUMES: CPT

## 2021-02-11 PROCEDURE — 94760 N-INVAS EAR/PLS OXIMETRY 1: CPT

## 2021-02-11 PROCEDURE — 94729 DIFFUSING CAPACITY: CPT

## 2021-02-23 ENCOUNTER — OFFICE VISIT (OUTPATIENT)
Dept: PULMONOLOGY | Facility: MEDICAL CENTER | Age: 66
End: 2021-02-23
Payer: COMMERCIAL

## 2021-02-23 VITALS
OXYGEN SATURATION: 98 % | DIASTOLIC BLOOD PRESSURE: 76 MMHG | BODY MASS INDEX: 30.55 KG/M2 | SYSTOLIC BLOOD PRESSURE: 132 MMHG | HEIGHT: 62 IN | TEMPERATURE: 97.8 F | RESPIRATION RATE: 12 BRPM | WEIGHT: 166 LBS | HEART RATE: 86 BPM

## 2021-02-23 DIAGNOSIS — R91.8 LUNG NODULES: ICD-10-CM

## 2021-02-23 DIAGNOSIS — G47.33 OSA (OBSTRUCTIVE SLEEP APNEA): ICD-10-CM

## 2021-02-23 DIAGNOSIS — J43.2 CENTRILOBULAR EMPHYSEMA (HCC): Primary | ICD-10-CM

## 2021-02-23 DIAGNOSIS — F17.200 SMOKER: ICD-10-CM

## 2021-02-23 PROCEDURE — 99214 OFFICE O/P EST MOD 30 MIN: CPT | Performed by: INTERNAL MEDICINE

## 2021-02-23 RX ORDER — PREDNISOLONE ACETATE 10 MG/ML
SUSPENSION/ DROPS OPHTHALMIC
COMMUNITY
Start: 2021-02-17 | End: 2022-01-13

## 2021-02-23 RX ORDER — KETOROLAC TROMETHAMINE 5 MG/ML
SOLUTION OPHTHALMIC
COMMUNITY
Start: 2021-02-17 | End: 2022-01-13

## 2021-02-23 RX ORDER — POLYMYXIN B SULFATE AND TRIMETHOPRIM 1; 10000 MG/ML; [USP'U]/ML
SOLUTION OPHTHALMIC
COMMUNITY
Start: 2021-02-17 | End: 2022-01-13

## 2021-02-23 NOTE — PROGRESS NOTES
Assessment/Plan        Problem List Items Addressed This Visit        Respiratory    Centrilobular emphysema ([address] ) - Primary       I reviewed results of complete pulmonary function test done February 11 of this year with her  This shows only minimal COPD  Her FEV1 is preserved at 1 84 L or 82% of predicted with normal obstructive index of 78%  Residual volume was mildly elevated indicating air trapping  Diffusion measurement was mildly decreased at 74%  I did give her sample Stiolto she can try 2 puffs in the evening as she sometimes she has wheezing at night  She is not sure she went to use this as she states she has multiple allergies  If she does not use this and once a short-acting albuterol inhaler she can call my office  As her lung functions fairly well preserved and she is not having much in way of shortness of breath she does not need to be on any maintenance inhaler  MARY (obstructive sleep apnea)       I reviewed results of home sleep study that was done June 21st   This showed mild MARY with overall AHI of 8 2  I discussed the diagnosis and treatment of MARY with her  She did not want pursue CPAP therapy instead will try to lose weight  She states she will call my office if she changes her mind  Other    Lung nodules      She does have history of small lung nodules  Last CT scan done August 2020 showed a new 4 mm right lower lobe lung nodule  She also 3 mm stable left upper lobe lung nodule  She was given prescription to have follow-up CT of chest done in August of this year  Smoker       She still smokes a half a pack of cigarettes per day  I did discuss smoking cessation with her  Results (PFT; HST)      ANN MARIE [address] for follow-up visit wanted to review results of recent PFT and home sleep study that was done  She is overweight and does have some daytime fatigue and does have some snoring  She does get occasional wheezing    She did have complete PFT done February 11 and this showed lung volumes to be central normal with evidence of mild air trapping  Her FEV1 was 1 84 L or 82% predicted with normal obstructive index 78%  Residual volume was mildly elevated at 126% of predicted and TLC was normal at 102% of predicted  Diffusion capacity measurement was only minimally decreased at 74%     home diagnostic sleep study done January 21st was reviewed with patient  This showed evidence of mild obstructive sleep apnea  Overall AHI was 8 2 and some mild snoring was detected  She had 0 obstructive apneas and 55 hypopneas  She spent 64% in the supine position and no change in AHI despite body position  Mean O2 saturation was 92% with robbin of 86%  Average heart rate was 70 beats per minute     She does have history of small lung nodules  Last CT scan was done and August of 2020 and showed a stable 3 mm left upper lobe lung nodule but there was a new 4 mm right lower lobe lung nodule  He does have CT scan ordered for 1 year follow-up or August of this year for the the new 4 mm right lower lobe lung nodule  She does smoke half a pack of   Cigarettes per day and has history smoking 1/2 to 1 pack of cigarettes per day for about 49 years          Past Medical History:   Diagnosis Date    Acid reflux     Anxiety     Arthritis     Cancer (HCC)     skin on chin    Cardiac disease     Chronic kidney disease     (R) kidney smaller than (L),  kidney stones    Chronic UTI (urinary tract infection)     Colitis     microscopic    COPD (chronic obstructive pulmonary disease) (HCC)     mild, recent dx    Depression     Epidermal cyst of face     last assessed 10-    Heart attack ([address] ) 09/20/2018    [place] (hard of hearing)     deaf (L) ear, decreased hearing (R) ear    Hyperlipemia     diet controlled    Hyperlipidemia     IBS (irritable bowel syndrome)     Malignant neoplasm of skin     Meniere disease     Myocardial infarction ([address] ) 09/2018    Numbness and tingling of left side of face     Uterine leiomyoma        Past Surgical History:   Procedure Laterality Date    CARDIAC CATHETERIZATION      9/21/18 [facility] - severe CAD of the LAD and diagonal artery    COLONOSCOPY N/A 11/4/2016    Procedure: COLONOSCOPY;  Surgeon: Jerry Guajardo MD;  Location: [place] [id number] GI LAB; Service:     ERCP      ESOPHAGOGASTRODUODENOSCOPY N/A 11/4/2016    Procedure: ESOPHAGOGASTRODUODENOSCOPY (EGD); Surgeon: Jerry Guajardo MD;  Location: [facility] GI LAB; Service:     EYE SURGERY      TN XCAPSL CTRC RMVL INSJ IO LENS PROSTH W/O ECP Left 3/7/2016    Procedure: EXTRACTION EXTRACAPSULAR CATARACT PHACO INTRAOCULAR LENS (IOL);   Surgeon: Stephon Mata MD;  Location: [facility] MAIN OR;  Service: Ophthalmology    SKIN CANCER EXCISION      excision skin cancer on chin         Current Outpatient Medications:     aspirin 81 mg chewable tablet, Chew 81 mg daily, Disp: , Rfl:     ezetimibe (ZETIA) 10 mg tablet, Take 10 mg by mouth daily, Disp: , Rfl:     rosuvastatin (CRESTOR) 20 MG tablet, Take 20 mg by mouth daily, Disp: , Rfl:     ketorolac (ACULAR) 0 5 % ophthalmic solution, instill 1 drop into right eye four times a day, Disp: , Rfl:     polymyxin b-trimethoprim (POLYTRIM) ophthalmic solution, instill 1 drop into left eye four times a day, Disp: , Rfl:     prednisoLONE acetate (PRED FORTE) 1 % ophthalmic suspension, instill 1 drop into right eye four times a day, Disp: , Rfl:     Allergies   Allergen Reactions    Erythromycin Hives and Itching    Nizatidine Other (See Comments)     Severe chest pain  Severe chest pain    Penicillins Hives and Itching     Rash, hives    Wellbutrin [Bupropion] Other (See Comments)     Elevated  / 110    Ciprofloxacin Hcl Hives    Fish-Derived Products GI Intolerance     Tuna fish & all shell fish    Other Hives     allegic to all cillans and all mycins    Ampicillin     Benadryl [Diphenhydramine Hcl] Other (See Comments)     unknown    Ciprofloxacin      Severe hives, sob?  Clindamycin     Diphenhydramine      Facial swelling ?  Doxycycline     Lovastatin      Muscle aches  Muscle aches    Nickel Other (See Comments)     Throat swelling/itching    Shellfish-Derived Products GI Intolerance     Pt unsure       Social History     Tobacco Use    Smoking status: Current Every Day Smoker     Packs/day: 0 50     Years: 49 00     Pack years: 24 50     Types: Cigarettes    Smokeless tobacco: Never Used    Tobacco comment: 45+ years per allscripts   Substance Use Topics    Alcohol use: No         Family History   Problem Relation Age of Onset    Heart disease Mother         cardiac disorder    Lung cancer Mother     COPD Mother     Colonic polyp Sister     Thyroid cancer Sister     Osteoporosis Family     Colon cancer Family     Heart disease Family         cardiac disorder    Heart disease Family         cardiac disorder    Stroke Family     Arthritis Family     Colon cancer Family     Lung cancer Family     Cancer Family         anterior wall of urinanry bladder    Breast cancer Family     Other Family         brain tumor    Breast cancer Cousin 21    Brain cancer Father     Skin cancer Brother     Skin cancer Brother     Diabetes Son     Neuropathy Son     No Known Problems Son        Review of Systems   Constitutional: Positive for fatigue  Negative for chills, fever and unexpected weight change  HENT: Negative for congestion, rhinorrhea and sore throat  Eyes: Negative for discharge and redness  Respiratory:        Sometimes has wheezing at night  Not having shortness of breath with her usual activity   Cardiovascular: Negative for chest pain, palpitations and leg swelling  Gastrointestinal: Negative for abdominal distention, abdominal pain and nausea  Endocrine: Negative for polydipsia and polyphagia  Genitourinary: Negative for dysuria     Musculoskeletal: Negative for joint swelling and myalgias  Skin: Negative for rash  Neurological: Negative for light-headedness  Psychiatric/Behavioral: Negative for decreased concentration  Vitals:    02/23/21 1034   BP: 132/76   Pulse: 86   Resp: 12   Temp: 97 8 °F (36 6 °C)   SpO2: 98%           Physical Exam  Constitutional:       General: She is not in acute distress  Appearance: She is well-developed  HENT:      Head: Normocephalic  Nose: Nose normal       Mouth/Throat:      Mouth: Mucous membranes are moist       Pharynx: Oropharynx is clear  No oropharyngeal exudate  Comments: Mallampati score is 2  Eyes:      Conjunctiva/sclera: Conjunctivae normal       Pupils: Pupils are equal, round, and reactive to light  Cardiovascular:      Rate and Rhythm: Normal rate and regular rhythm  Heart sounds: Normal heart sounds  Pulmonary:      Effort: Pulmonary effort is normal       Comments: Lung sounds reveal some expiratory wheezes in both lower lobes  Abdominal:      General: There is no distension  Palpations: Abdomen is soft  Tenderness: There is no abdominal tenderness  Musculoskeletal:      Comments: No edema, cyanosis or clubbing   Skin:     General: Skin is warm and dry  Neurological:      Mental Status: She is alert and oriented to person, place, and time  Psychiatric:         Mood and Affect: Mood normal          Behavior: Behavior normal          Thought Content:  Thought content normal

## 2021-02-23 NOTE — PATIENT INSTRUCTIONS
Think about whether you want CPAP machine or not - nasal mask    Try Stiolto 2 puffs once a day in the evening    If helps then I can try prescribing it or if no help I will try an inhaler with a steroid    Follow up CT scan August  Schedule in early August    Try to lose weight

## 2021-03-05 NOTE — ASSESSMENT & PLAN NOTE
I reviewed results of complete pulmonary function test done February 11 of this year with her  This shows only minimal COPD  Her FEV1 is preserved at 1 84 L or 82% of predicted with normal obstructive index of 78%  Residual volume was mildly elevated indicating air trapping  Diffusion measurement was mildly decreased at 74%  I did give her sample Stiolto she can try 2 puffs in the evening as she sometimes she has wheezing at night  She is not sure she went to use this as she states she has multiple allergies  If she does not use this and once a short-acting albuterol inhaler she can call my office  As her lung functions fairly well preserved and she is not having much in way of shortness of breath she does not need to be on any maintenance inhaler

## 2021-03-05 NOTE — ASSESSMENT & PLAN NOTE
I reviewed results of home sleep study that was done June 21st   This showed mild MARY with overall AHI of 8 2  I discussed the diagnosis and treatment of MARY with her  She did not want pursue CPAP therapy instead will try to lose weight  She states she will call my office if she changes her mind

## 2021-03-05 NOTE — ASSESSMENT & PLAN NOTE
She does have history of small lung nodules  Last CT scan done August 2020 showed a new 4 mm right lower lobe lung nodule  She also 3 mm stable left upper lobe lung nodule  She was given prescription to have follow-up CT of chest done in August of this year

## 2021-04-01 ENCOUNTER — HOSPITAL ENCOUNTER (EMERGENCY)
Facility: HOSPITAL | Age: 66
Discharge: HOME | End: 2021-04-01
Attending: EMERGENCY MEDICINE | Admitting: EMERGENCY MEDICINE
Payer: COMMERCIAL

## 2021-04-01 ENCOUNTER — APPOINTMENT (EMERGENCY)
Dept: RADIOLOGY | Facility: HOSPITAL | Age: 66
End: 2021-04-01
Payer: COMMERCIAL

## 2021-04-01 VITALS
RESPIRATION RATE: 18 BRPM | HEART RATE: 70 BPM | TEMPERATURE: 98.5 F | DIASTOLIC BLOOD PRESSURE: 74 MMHG | SYSTOLIC BLOOD PRESSURE: 139 MMHG | OXYGEN SATURATION: 98 %

## 2021-04-01 DIAGNOSIS — K21.9 GERD (GASTROESOPHAGEAL REFLUX DISEASE): ICD-10-CM

## 2021-04-01 DIAGNOSIS — R07.9 CHEST PAIN: Primary | ICD-10-CM

## 2021-04-01 LAB
ALBUMIN SERPL BCP-MCNC: 4.1 G/DL (ref 3.5–5)
ALP SERPL-CCNC: 104 U/L (ref 46–116)
ALT SERPL W P-5'-P-CCNC: 44 U/L (ref 12–78)
ANION GAP SERPL CALCULATED.3IONS-SCNC: 9 MMOL/L (ref 4–13)
APTT PPP: 33 SECONDS (ref 23–37)
AST SERPL W P-5'-P-CCNC: 29 U/L (ref 5–45)
ATRIAL RATE: 98 BPM
BASOPHILS # BLD AUTO: 0.07 THOUSANDS/ΜL (ref 0–0.1)
BASOPHILS NFR BLD AUTO: 1 % (ref 0–1)
BILIRUB SERPL-MCNC: 0.5 MG/DL (ref 0.2–1)
BUN SERPL-MCNC: 10 MG/DL (ref 5–25)
CALCIUM SERPL-MCNC: 9.7 MG/DL (ref 8.3–10.1)
CHLORIDE SERPL-SCNC: 104 MMOL/L (ref 100–108)
CO2 SERPL-SCNC: 28 MMOL/L (ref 21–32)
CREAT SERPL-MCNC: 0.85 MG/DL (ref 0.6–1.3)
EOSINOPHIL # BLD AUTO: 0.2 THOUSAND/ΜL (ref 0–0.61)
EOSINOPHIL NFR BLD AUTO: 2 % (ref 0–6)
ERYTHROCYTE [DISTWIDTH] IN BLOOD BY AUTOMATED COUNT: 12.9 % (ref 11.6–15.1)
GFR SERPL CREATININE-BSD FRML MDRD: 73 ML/MIN/1.73SQ M
GLUCOSE SERPL-MCNC: 108 MG/DL (ref 65–140)
HCT VFR BLD AUTO: 47.1 % (ref 34.8–46.1)
HGB BLD-MCNC: 15 G/DL (ref 11.5–15.4)
IMM GRANULOCYTES # BLD AUTO: 0.03 THOUSAND/UL (ref 0–0.2)
IMM GRANULOCYTES NFR BLD AUTO: 0 % (ref 0–2)
INR PPP: 0.94 (ref 0.84–1.19)
LYMPHOCYTES # BLD AUTO: 1.69 THOUSANDS/ΜL (ref 0.6–4.47)
LYMPHOCYTES NFR BLD AUTO: 19 % (ref 14–44)
MCH RBC QN AUTO: 29.2 PG (ref 26.8–34.3)
MCHC RBC AUTO-ENTMCNC: 31.8 G/DL (ref 31.4–37.4)
MCV RBC AUTO: 92 FL (ref 82–98)
MONOCYTES # BLD AUTO: 0.62 THOUSAND/ΜL (ref 0.17–1.22)
MONOCYTES NFR BLD AUTO: 7 % (ref 4–12)
NEUTROPHILS # BLD AUTO: 6.45 THOUSANDS/ΜL (ref 1.85–7.62)
NEUTS SEG NFR BLD AUTO: 71 % (ref 43–75)
NRBC BLD AUTO-RTO: 0 /100 WBCS
P AXIS: 63 DEGREES
PLATELET # BLD AUTO: 219 THOUSANDS/UL (ref 149–390)
PMV BLD AUTO: 9.7 FL (ref 8.9–12.7)
POTASSIUM SERPL-SCNC: 3.5 MMOL/L (ref 3.5–5.3)
PR INTERVAL: 130 MS
PROT SERPL-MCNC: 7.4 G/DL (ref 6.4–8.2)
PROTHROMBIN TIME: 12.4 SECONDS (ref 11.6–14.5)
QRS AXIS: 32 DEGREES
QRSD INTERVAL: 82 MS
QT INTERVAL: 356 MS
QTC INTERVAL: 454 MS
RBC # BLD AUTO: 5.13 MILLION/UL (ref 3.81–5.12)
SODIUM SERPL-SCNC: 141 MMOL/L (ref 136–145)
T WAVE AXIS: 61 DEGREES
TROPONIN I SERPL-MCNC: <0.02 NG/ML
TROPONIN I SERPL-MCNC: <0.02 NG/ML
VENTRICULAR RATE: 98 BPM
WBC # BLD AUTO: 9.06 THOUSAND/UL (ref 4.31–10.16)

## 2021-04-01 PROCEDURE — 93010 ELECTROCARDIOGRAM REPORT: CPT | Performed by: INTERNAL MEDICINE

## 2021-04-01 PROCEDURE — 99285 EMERGENCY DEPT VISIT HI MDM: CPT

## 2021-04-01 PROCEDURE — 80053 COMPREHEN METABOLIC PANEL: CPT | Performed by: EMERGENCY MEDICINE

## 2021-04-01 PROCEDURE — 84484 ASSAY OF TROPONIN QUANT: CPT | Performed by: EMERGENCY MEDICINE

## 2021-04-01 PROCEDURE — 85025 COMPLETE CBC W/AUTO DIFF WBC: CPT | Performed by: EMERGENCY MEDICINE

## 2021-04-01 PROCEDURE — 93005 ELECTROCARDIOGRAM TRACING: CPT

## 2021-04-01 PROCEDURE — 85610 PROTHROMBIN TIME: CPT | Performed by: EMERGENCY MEDICINE

## 2021-04-01 PROCEDURE — 71045 X-RAY EXAM CHEST 1 VIEW: CPT

## 2021-04-01 PROCEDURE — 99285 EMERGENCY DEPT VISIT HI MDM: CPT | Performed by: EMERGENCY MEDICINE

## 2021-04-01 PROCEDURE — 36415 COLL VENOUS BLD VENIPUNCTURE: CPT | Performed by: EMERGENCY MEDICINE

## 2021-04-01 PROCEDURE — 85730 THROMBOPLASTIN TIME PARTIAL: CPT | Performed by: EMERGENCY MEDICINE

## 2021-04-01 RX ORDER — MAGNESIUM HYDROXIDE/ALUMINUM HYDROXICE/SIMETHICONE 120; 1200; 1200 MG/30ML; MG/30ML; MG/30ML
30 SUSPENSION ORAL ONCE
Status: COMPLETED | OUTPATIENT
Start: 2021-04-01 | End: 2021-04-01

## 2021-04-01 RX ADMIN — ALUMINA, MAGNESIA, AND SIMETHICONE ORAL SUSPENSION REGULAR STRENGTH 30 ML: 1200; 1200; 120 SUSPENSION ORAL at 12:19

## 2021-04-01 NOTE — ED NOTES
[id number] Eliana Mcdonald for patient to have coffee according to Dr Carter Favor  Given patient a cup       Saritha Mary RN  04/01/21 1058

## 2021-04-01 NOTE — ED NOTES
Dr Traci Villagran in the room doing assessment on the patient       Darwin Traylor, RN  04/01/21 9698

## 2021-04-01 NOTE — ED PROVIDER NOTES
History  Chief Complaint   Patient presents with    Chest Pain     Pt, c o of chest pain when she woke up this morning but had this episode for the past 2 weeks  Patient has a history of coronary artery disease and COPD  She continues to smoke  Patient states she had been having pain in the center of the chest every morning for the past 2 weeks  She describes the pain as heaviness and pressure  Patient states the pain usually goes away after morning cup of coffee with a cigarette, and a morning bowel movement  She states sometimes the pain returns on the left side of the chest during the day but never last more than a few minutes  Her last cardiac catheterization was about 2 years ago  Patient states she has had 2 stress tests since that time which she passed          Prior to Admission Medications   Prescriptions Last Dose Informant Patient Reported?  Taking?   aspirin 81 mg chewable tablet 3/31/2021 at Unknown time Self Yes Yes   Sig: Chew 81 mg daily   ezetimibe (ZETIA) 10 mg tablet 3/31/2021 at Unknown time Self Yes Yes   Sig: Take 10 mg by mouth daily   ketorolac (ACULAR) 0 5 % ophthalmic solution Not Taking at Unknown time  Yes No   Sig: instill 1 drop into right eye four times a day   polymyxin b-trimethoprim (POLYTRIM) ophthalmic solution Not Taking at Unknown time  Yes No   Sig: instill 1 drop into left eye four times a day   prednisoLONE acetate (PRED FORTE) 1 % ophthalmic suspension Not Taking at Unknown time  Yes No   Sig: instill 1 drop into right eye four times a day   rosuvastatin (CRESTOR) 20 MG tablet 3/31/2021 at Unknown time  Yes Yes   Sig: Take 20 mg by mouth daily      Facility-Administered Medications: None       Past Medical History:   Diagnosis Date    Acid reflux     Anxiety     Arthritis     Cancer (HCC)     skin on chin    Cardiac disease     Chronic kidney disease     (R) kidney smaller than (L),  kidney stones    Chronic UTI (urinary tract infection)     Colitis microscopic    COPD (chronic obstructive pulmonary disease) (HCC)     mild, recent dx    Depression     Epidermal cyst of face     last assessed 10-    Heart attack ([facility] [address] ) 09/20/2018    [place] (hard of hearing)     deaf (L) ear, decreased hearing (R) ear    Hyperlipemia     diet controlled    Hyperlipidemia     IBS (irritable bowel syndrome)     Malignant neoplasm of skin     Meniere disease     Myocardial infarction ([facility] [address] ) 09/2018    Numbness and tingling of left side of face     Uterine leiomyoma        Past Surgical History:   Procedure Laterality Date    CARDIAC CATHETERIZATION      9/21/18 [facility] - severe CAD of the LAD and diagonal artery    COLONOSCOPY N/A 11/4/2016    Procedure: COLONOSCOPY;  Surgeon: Heriberto Finnegan MD;  Location: [facility] GI LAB; Service:     ERCP      ESOPHAGOGASTRODUODENOSCOPY N/A 11/4/2016    Procedure: ESOPHAGOGASTRODUODENOSCOPY (EGD); Surgeon: Heriberto Finnegan MD;  Location: [facility] GI LAB; Service:     EYE SURGERY      [place] XCAPSL CTRC RMVL INSJ IO LENS PROSTH W/O ECP Left 3/7/2016    Procedure: EXTRACTION EXTRACAPSULAR CATARACT PHACO INTRAOCULAR LENS (IOL);   Surgeon: Juan Manuel Carson MD;  Location: [facility] MAIN OR;  Service: Ophthalmology    SKIN CANCER EXCISION      excision skin cancer on chin       Family History   Problem Relation Age of Onset    Heart disease Mother         cardiac disorder    Lung cancer Mother     COPD Mother     Colonic polyp Sister     Thyroid cancer Sister     Osteoporosis Family     Colon cancer Family     Heart disease Family         cardiac disorder    Heart disease Family         cardiac disorder    Stroke Family     Arthritis Family     Colon cancer Family     Lung cancer Family     Cancer Family         anterior wall of urinanry bladder    Breast cancer Family     Other Family         brain tumor    Breast cancer Cousin 21    Brain cancer Father     Skin cancer Brother     Skin cancer Brother     Diabetes Son     Neuropathy Son     No Known Problems Son      I have reviewed and agree with the history as documented  E-Cigarette/Vaping     E-Cigarette/Vaping Substances     Social History     Tobacco Use    Smoking status: Current Every Day Smoker     Packs/day: 0 50     Years: 49 00     Pack years: 24 50     Types: Cigarettes    Smokeless tobacco: Never Used    Tobacco comment: 45+ years per allscripts   Substance Use Topics    Alcohol use: No    Drug use: No       Review of Systems   Constitutional: Negative for chills and fever  HENT: Negative for congestion and sore throat  Eyes: Positive for visual disturbance  Respiratory: Positive for cough  Negative for shortness of breath  Cardiovascular: Positive for chest pain  Negative for palpitations and leg swelling  Gastrointestinal: Negative for abdominal pain and vomiting  Genitourinary: Negative for dysuria  Musculoskeletal: Negative for back pain  Skin: Negative for rash  Neurological: Negative for weakness and headaches  Hematological: Does not bruise/bleed easily  Psychiatric/Behavioral: Negative for confusion  All other systems reviewed and are negative  Physical Exam  Physical Exam  Vitals signs and nursing note reviewed  Constitutional:       Appearance: She is well-developed  HENT:      Head: Normocephalic  Eyes:      Pupils: Pupils are equal, round, and reactive to light  Neck:      Musculoskeletal: Normal range of motion and neck supple  Cardiovascular:      Rate and Rhythm: Normal rate and regular rhythm  Heart sounds: Normal heart sounds  Pulmonary:      Effort: Pulmonary effort is normal       Breath sounds: Normal breath sounds  Chest:      Chest wall: No tenderness  Abdominal:      General: Bowel sounds are normal       Palpations: Abdomen is soft  Tenderness: There is no abdominal tenderness  Musculoskeletal: Normal range of motion  Right lower leg: She exhibits no tenderness  Edema present  Left lower leg: She exhibits no tenderness  Edema present  Skin:     General: Skin is warm and dry  Capillary Refill: Capillary refill takes less than 2 seconds  Neurological:      General: No focal deficit present  Mental Status: She is alert     Psychiatric:         Mood and Affect: Mood normal          Behavior: Behavior normal          Vital Signs  ED Triage Vitals [04/01/21 0908]   Temperature Pulse Respirations Blood Pressure SpO2   98 5 °F (36 9 °C) 96 20 (!) 188/91 99 %      Temp Source Heart Rate Source Patient Position - Orthostatic VS BP Location FiO2 (%)   Tympanic Monitor Lying Right arm --      Pain Score       2           Vitals:    04/01/21 0941 04/01/21 1046 04/01/21 1200 04/01/21 1220   BP: 164/77 (!) 187/82  139/74   Pulse: 80 68 72 70   Patient Position - Orthostatic VS: Lying Lying  Lying         Visual Acuity      ED Medications  Medications   aluminum-magnesium hydroxide-simethicone (MYLANTA) oral suspension 30 mL (30 mL Oral Given 4/1/21 1219)       Diagnostic Studies  Results Reviewed     Procedure Component Value Units Date/Time    Troponin I repeat in 3hrs [[id number]]  (Normal) Collected: 04/01/21 1218    Lab Status: Final result Specimen: Blood from Arm, Right Updated: 04/01/21 1252     Troponin I <0 02 ng/mL     Troponin I [[id number]]  (Normal) Collected: 04/01/21 0935    Lab Status: Final result Specimen: Blood from Arm, Right Updated: 04/01/21 1000     Troponin I <0 02 ng/mL     Comprehensive metabolic panel [[id number]] Collected: 04/01/21 0935    Lab Status: Final result Specimen: Blood from Arm, Right Updated: 04/01/21 0956     Sodium 141 mmol/L      Potassium 3 5 mmol/L      Chloride 104 mmol/L      CO2 28 mmol/L      ANION GAP 9 mmol/L      BUN 10 mg/dL      Creatinine 0 85 mg/dL      Glucose 108 mg/dL      Calcium 9 7 mg/dL      AST 29 U/L      ALT 44 U/L      Alkaline Phosphatase 104 U/L      Total Protein 7 4 g/dL      Albumin 4 1 g/dL      Total Bilirubin 0 50 mg/dL      eGFR 73 ml/min/1 73sq m     Narrative:      [place] guidelines for Chronic Kidney Disease (CKD):     Stage 1 with normal or high GFR (GFR > 90 mL/min/1 73 square meters)    Stage 2 Mild CKD (GFR = 60-89 mL/min/1 73 square meters)    Stage 3A Moderate CKD (GFR = 45-59 mL/min/1 73 square meters)    Stage 3B Moderate CKD (GFR = 30-44 mL/min/1 73 square meters)    Stage 4 Severe CKD (GFR = 15-29 mL/min/1 73 square meters)    Stage 5 End Stage CKD (GFR <15 mL/min/1 73 square meters)  Note: GFR calculation is accurate only with a steady state creatinine    Protime-INR [[id number]]  (Normal) Collected: 04/01/21 0935    Lab Status: Final result Specimen: Blood from Arm, Right Updated: 04/01/21 0953     Protime 12 4 seconds      INR 0 94    APTT [[id number]]  (Normal) Collected: 04/01/21 0935    Lab Status: Final result Specimen: Blood from Arm, Right Updated: 04/01/21 0953     PTT 33 seconds     CBC and differential [[id number]]  (Abnormal) Collected: 04/01/21 0935    Lab Status: Final result Specimen: Blood from Arm, Right Updated: 04/01/21 0940     WBC 9 06 Thousand/uL      RBC 5 13 Million/uL      Hemoglobin 15 0 g/dL      Hematocrit 47 1 %      MCV 92 fL      MCH 29 2 pg      MCHC 31 8 g/dL      RDW 12 9 %      MPV 9 7 fL      Platelets 835 Thousands/uL      nRBC 0 /100 WBCs      Neutrophils Relative 71 %      Immat GRANS % 0 %      Lymphocytes Relative 19 %      Monocytes Relative 7 %      Eosinophils Relative 2 %      Basophils Relative 1 %      Neutrophils Absolute 6 45 Thousands/µL      Immature Grans Absolute 0 03 Thousand/uL      Lymphocytes Absolute 1 69 Thousands/µL      Monocytes Absolute 0 62 Thousand/µL      Eosinophils Absolute 0 20 Thousand/µL      Basophils Absolute 0 07 Thousands/µL                  XR chest 1 view portable   Final Result by Jonathan Lerma MD (04/01 1628)   1  No acute disease  2   15 mm right lower lobe nodule is redemonstrated  Workstation performed: [id number]                    Procedures  ECG 12 Lead Documentation Only    Date/Time: 4/1/2021 9:12 AM  Performed by: Jose R Luke MD  Authorized by: Jose R Luke MD     Indications / Diagnosis:  Chest pain  ECG reviewed by me, the ED Provider: yes    Patient location:  ED  Interpretation:     Interpretation: abnormal    Rate:     ECG rate:  98    ECG rate assessment: normal    Rhythm:     Rhythm: sinus rhythm    Ectopy:     Ectopy: none    QRS:     QRS axis:  Normal    QRS intervals:  Normal  Conduction:     Conduction: normal    ST segments:     ST segments:  Non-specific    Depression:  II, V6, V5 and V4  T waves:     T waves: normal    Other findings:     Other findings: LAE               ED Course             HEART Risk Score      Most Recent Value   Heart Score Risk Calculator   History  0 Filed at: 04/01/2021 1217   ECG  1 Filed at: 04/01/2021 1217   Age  1 Filed at: 04/01/2021 1217   Risk Factors  2 Filed at: 04/01/2021 1217   Troponin  0 Filed at: 04/01/2021 1217   HEART Score  4 Filed at: 04/01/2021 1217                                    MDM  Number of Diagnoses or Management Options  Diagnosis management comments: Patient known history of CAD  Will check cardiac profile  X-ray finding of a right lower lobe nodule was discussed with the patient  I offered CT however she states that she has had CTs for it and is following up yearly with her pulmonologist who is aware of this        Disposition  Final diagnoses:   Chest pain   GERD (gastroesophageal reflux disease)     Time reflects when diagnosis was documented in both MDM as applicable and the Disposition within this note     Time User Action Codes Description Comment    4/1/2021 12:18 PM Baudilio ARNOLD Add [R07 9] Chest pain     4/1/2021 12:18 PM Baudilio ARNOLD Add [K21 9] GERD (gastroesophageal reflux disease)       ED Disposition     ED Disposition Condition Date/Time Comment    Discharge Stable Thu Apr 1, 2021 12:19 PM Kirk Aranda discharge to home/self care  Follow-up Information     Follow up With Specialties Details Why Contact Info    Meño Ellis MD Family Medicine, Sports Medicine   [address]  [phone]      Corky Ayala MD Cardiology Schedule an appointment as soon as possible for a visit   [address] [place] [address]  [phone]            Discharge Medication List as of 4/1/2021 12:19 PM      CONTINUE these medications which have NOT CHANGED    Details   aspirin 81 mg chewable tablet Chew 81 mg daily, Historical Med      ezetimibe (ZETIA) 10 mg tablet Take 10 mg by mouth daily, Historical Med      rosuvastatin (CRESTOR) 20 MG tablet Take 20 mg by mouth daily, Historical Med      ketorolac (ACULAR) 0 5 % ophthalmic solution instill 1 drop into right eye four times a day, Historical Med      polymyxin b-trimethoprim (POLYTRIM) ophthalmic solution instill 1 drop into left eye four times a day, Historical Med      prednisoLONE acetate (PRED FORTE) 1 % ophthalmic suspension instill 1 drop into right eye four times a day, Historical Med           No discharge procedures on file      PDMP Review     None          ED Provider  Electronically Signed by           Juan A Hendrix MD  04/01/21 3782

## 2021-08-25 ENCOUNTER — HOSPITAL ENCOUNTER (OUTPATIENT)
Dept: RADIOLOGY | Facility: HOSPITAL | Age: 66
Discharge: HOME | End: 2021-08-25
Attending: INTERNAL MEDICINE
Payer: COMMERCIAL

## 2021-08-25 DIAGNOSIS — R91.8 LUNG NODULES: ICD-10-CM

## 2021-08-25 PROCEDURE — G1004 CDSM NDSC: HCPCS

## 2021-08-25 PROCEDURE — 71250 CT THORAX DX C-: CPT

## 2021-08-26 ENCOUNTER — APPOINTMENT (EMERGENCY)
Dept: RADIOLOGY | Facility: HOSPITAL | Age: 66
End: 2021-08-26
Payer: COMMERCIAL

## 2021-08-26 ENCOUNTER — HOSPITAL ENCOUNTER (EMERGENCY)
Facility: HOSPITAL | Age: 66
Discharge: LEFT BEFORE DISCHARGE | End: 2021-08-26
Attending: EMERGENCY MEDICINE | Admitting: EMERGENCY MEDICINE
Payer: COMMERCIAL

## 2021-08-26 VITALS
OXYGEN SATURATION: 97 % | TEMPERATURE: 98.1 F | BODY MASS INDEX: 29.63 KG/M2 | WEIGHT: 161 LBS | DIASTOLIC BLOOD PRESSURE: 67 MMHG | HEART RATE: 59 BPM | RESPIRATION RATE: 16 BRPM | SYSTOLIC BLOOD PRESSURE: 131 MMHG | HEIGHT: 62 IN

## 2021-08-26 DIAGNOSIS — R07.9 CHEST PAIN: Primary | ICD-10-CM

## 2021-08-26 LAB
ANION GAP SERPL CALCULATED.3IONS-SCNC: 10 MMOL/L (ref 4–13)
ATRIAL RATE: 99 BPM
BASOPHILS # BLD AUTO: 0.05 THOUSANDS/ΜL (ref 0–0.1)
BASOPHILS NFR BLD AUTO: 1 % (ref 0–1)
BUN SERPL-MCNC: 10 MG/DL (ref 6–20)
CALCIUM SERPL-MCNC: 9.9 MG/DL (ref 8.4–10.2)
CHLORIDE SERPL-SCNC: 106 MMOL/L (ref 96–108)
CO2 SERPL-SCNC: 25 MMOL/L (ref 22–33)
CREAT SERPL-MCNC: 0.84 MG/DL (ref 0.4–1.1)
EOSINOPHIL # BLD AUTO: 0.34 THOUSAND/ΜL (ref 0–0.61)
EOSINOPHIL NFR BLD AUTO: 4 % (ref 0–6)
ERYTHROCYTE [DISTWIDTH] IN BLOOD BY AUTOMATED COUNT: 14 % (ref 11.6–15.1)
GFR SERPL CREATININE-BSD FRML MDRD: 74 ML/MIN/1.73SQ M
GLUCOSE SERPL-MCNC: 99 MG/DL (ref 65–140)
HCT VFR BLD AUTO: 45 % (ref 34.8–46.1)
HGB BLD-MCNC: 14.8 G/DL (ref 11.5–15.4)
IMM GRANULOCYTES # BLD AUTO: 0.02 THOUSAND/UL (ref 0–0.2)
IMM GRANULOCYTES NFR BLD AUTO: 0 % (ref 0–2)
LYMPHOCYTES # BLD AUTO: 2.36 THOUSANDS/ΜL (ref 0.6–4.47)
LYMPHOCYTES NFR BLD AUTO: 26 % (ref 14–44)
MCH RBC QN AUTO: 29.7 PG (ref 26.8–34.3)
MCHC RBC AUTO-ENTMCNC: 32.9 G/DL (ref 31.4–37.4)
MCV RBC AUTO: 90 FL (ref 82–98)
MONOCYTES # BLD AUTO: 0.9 THOUSAND/ΜL (ref 0.17–1.22)
MONOCYTES NFR BLD AUTO: 10 % (ref 4–12)
NEUTROPHILS # BLD AUTO: 5.37 THOUSANDS/ΜL (ref 1.85–7.62)
NEUTS SEG NFR BLD AUTO: 59 % (ref 43–75)
P AXIS: 61 DEGREES
PLATELET # BLD AUTO: 199 THOUSANDS/UL (ref 149–390)
PMV BLD AUTO: 10.1 FL (ref 8.9–12.7)
POTASSIUM SERPL-SCNC: 4 MMOL/L (ref 3.5–5)
PR INTERVAL: 151 MS
QRS AXIS: 37 DEGREES
QRSD INTERVAL: 88 MS
QT INTERVAL: 353 MS
QTC INTERVAL: 451 MS
RBC # BLD AUTO: 4.98 MILLION/UL (ref 3.81–5.12)
SODIUM SERPL-SCNC: 141 MMOL/L (ref 133–145)
T WAVE AXIS: 32 DEGREES
TROPONIN I SERPL-MCNC: <0.03 NG/ML (ref 0–0.07)
TROPONIN I SERPL-MCNC: <0.03 NG/ML (ref 0–0.07)
VENTRICULAR RATE: 98 BPM
WBC # BLD AUTO: 9.04 THOUSAND/UL (ref 4.31–10.16)

## 2021-08-26 PROCEDURE — 93005 ELECTROCARDIOGRAM TRACING: CPT

## 2021-08-26 PROCEDURE — 99285 EMERGENCY DEPT VISIT HI MDM: CPT | Performed by: STUDENT IN AN ORGANIZED HEALTH CARE EDUCATION/TRAINING PROGRAM

## 2021-08-26 PROCEDURE — 80048 BASIC METABOLIC PNL TOTAL CA: CPT | Performed by: STUDENT IN AN ORGANIZED HEALTH CARE EDUCATION/TRAINING PROGRAM

## 2021-08-26 PROCEDURE — 85025 COMPLETE CBC W/AUTO DIFF WBC: CPT | Performed by: STUDENT IN AN ORGANIZED HEALTH CARE EDUCATION/TRAINING PROGRAM

## 2021-08-26 PROCEDURE — 84484 ASSAY OF TROPONIN QUANT: CPT | Performed by: STUDENT IN AN ORGANIZED HEALTH CARE EDUCATION/TRAINING PROGRAM

## 2021-08-26 PROCEDURE — 93010 ELECTROCARDIOGRAM REPORT: CPT | Performed by: INTERNAL MEDICINE

## 2021-08-26 PROCEDURE — 36415 COLL VENOUS BLD VENIPUNCTURE: CPT | Performed by: STUDENT IN AN ORGANIZED HEALTH CARE EDUCATION/TRAINING PROGRAM

## 2021-08-26 PROCEDURE — 99285 EMERGENCY DEPT VISIT HI MDM: CPT

## 2021-08-26 PROCEDURE — 71045 X-RAY EXAM CHEST 1 VIEW: CPT

## 2021-08-26 PROCEDURE — 96360 HYDRATION IV INFUSION INIT: CPT

## 2021-08-26 RX ORDER — MAGNESIUM HYDROXIDE/ALUMINUM HYDROXICE/SIMETHICONE 120; 1200; 1200 MG/30ML; MG/30ML; MG/30ML
30 SUSPENSION ORAL ONCE
Status: COMPLETED | OUTPATIENT
Start: 2021-08-26 | End: 2021-08-26

## 2021-08-26 RX ORDER — ATORVASTATIN CALCIUM 20 MG/1
30 TABLET, FILM COATED ORAL DAILY
COMMUNITY

## 2021-08-26 RX ORDER — ASPIRIN 81 MG/1
243 TABLET, CHEWABLE ORAL ONCE
Status: COMPLETED | OUTPATIENT
Start: 2021-08-26 | End: 2021-08-26

## 2021-08-26 RX ORDER — LIDOCAINE HYDROCHLORIDE 20 MG/ML
15 SOLUTION OROPHARYNGEAL ONCE
Status: COMPLETED | OUTPATIENT
Start: 2021-08-26 | End: 2021-08-26

## 2021-08-26 RX ORDER — SODIUM CHLORIDE 9 MG/ML
3 INJECTION INTRAVENOUS
Status: DISCONTINUED | OUTPATIENT
Start: 2021-08-26 | End: 2021-08-26 | Stop reason: HOSPADM

## 2021-08-26 RX ADMIN — ALUMINA, MAGNESIA, AND SIMETHICONE ORAL SUSPENSION REGULAR STRENGTH 30 ML: 1200; 1200; 120 SUSPENSION ORAL at 03:08

## 2021-08-26 RX ADMIN — ASPIRIN 243 MG: 81 TABLET, CHEWABLE ORAL at 03:06

## 2021-08-26 RX ADMIN — LIDOCAINE HYDROCHLORIDE 15 ML: 20 SOLUTION ORAL; TOPICAL at 03:07

## 2021-08-26 RX ADMIN — SODIUM CHLORIDE 500 ML: 0.9 INJECTION, SOLUTION INTRAVENOUS at 03:21

## 2021-08-26 NOTE — ED PROVIDER NOTES
History  Chief Complaint   Patient presents with    Chest Pain     pt reports 7/10 chest pain starting 1 hour ago  Dull Pain nonradiating  Patient is a 62-year-old female past medical history of MI who presents ED today with complaint of chest pain x1 hour  Patient states she was at home on her couch when she began experiencing chest pain she describes as a pressure sensation in the middle of her chest, rated 7 out 10, constant, does not radiate  Patient states she took her daily 81 mg of aspirin approximately 5 hours ago  She also admits to associated dizziness, nausea, and anxiety  Patient states her current chest pain is not similar to her prior MI but admits the associated dizziness nausea and anxiety are similar to her previous MI  Patient denies shortness of breath, fever/chills, diaphoresis, lightheadedness, syncope, abdominal pain, vomiting, diarrhea           Prior to Admission Medications   Prescriptions Last Dose Informant Patient Reported?  Taking?   aspirin 81 mg chewable tablet 8/25/2021 at 2100 Self Yes Yes   Sig: Chew 81 mg daily   atorvastatin (LIPITOR) 20 mg tablet 8/25/2021 at Unknown time Self Yes Yes   Sig: Take 30 mg by mouth daily   ezetimibe (ZETIA) 10 mg tablet 8/25/2021 at Unknown time Self Yes Yes   Sig: Take 10 mg by mouth daily   ketorolac (ACULAR) 0 5 % ophthalmic solution 8/25/2021 at Unknown time  Yes Yes   Sig: instill 1 drop into right eye four times a day   polymyxin b-trimethoprim (POLYTRIM) ophthalmic solution 8/25/2021 at Unknown time  Yes Yes   Sig: instill 1 drop into left eye four times a day   prednisoLONE acetate (PRED FORTE) 1 % ophthalmic suspension 8/25/2021 at Unknown time  Yes Yes   Sig: instill 1 drop into right eye four times a day   rosuvastatin (CRESTOR) 20 MG tablet Not Taking at Unknown time  Yes No   Sig: Take 20 mg by mouth daily   Patient not taking: Reported on 8/26/2021      Facility-Administered Medications: None       Past Medical History: Diagnosis Date    Acid reflux     Anxiety     Arthritis     Cancer (HCC)     skin on chin    Cardiac disease     Chronic kidney disease     (R) kidney smaller than (L),  kidney stones    Chronic UTI (urinary tract infection)     Colitis     microscopic    COPD (chronic obstructive pulmonary disease) (HCC)     mild, recent dx    Depression     Epidermal cyst of face     last assessed 10-    Heart attack ([facility] [address] ) 09/20/2018    [place] (hard of hearing)     deaf (L) ear, decreased hearing (R) ear    Hyperlipemia     diet controlled    Hyperlipidemia     IBS (irritable bowel syndrome)     Malignant neoplasm of skin     Meniere disease     Myocardial infarction ([facility] [address] ) 09/2018    Numbness and tingling of left side of face     Uterine leiomyoma        Past Surgical History:   Procedure Laterality Date    CARDIAC CATHETERIZATION      9/21/18 [facility] - severe CAD of the LAD and diagonal artery    COLONOSCOPY N/A 11/4/2016    Procedure: COLONOSCOPY;  Surgeon: Sumi Villatoro MD;  Location: [facility]; Service:     ERCP      ESOPHAGOGASTRODUODENOSCOPY N/A 11/4/2016    Procedure: ESOPHAGOGASTRODUODENOSCOPY (EGD); Surgeon: Sumi Villatoro MD;  Location: [facility]; Service:     EYE SURGERY      UT XCAPSL CTRC RMVL INSJ IO LENS PROSTH W/O ECP Left 3/7/2016    Procedure: EXTRACTION EXTRACAPSULAR CATARACT PHACO INTRAOCULAR LENS (IOL);   Surgeon: Aurea Garnica MD;  Location: [facility] MAIN OR;  Service: Ophthalmology    SKIN CANCER EXCISION      excision skin cancer on chin       Family History   Problem Relation Age of Onset    Heart disease Mother         cardiac disorder    Lung cancer Mother     COPD Mother     Colonic polyp Sister     Thyroid cancer Sister     Osteoporosis Family     Colon cancer Family     Heart disease Family         cardiac disorder    Heart disease Family         cardiac disorder    Stroke Family     Arthritis Family     Colon cancer Family     Lung cancer Family     Cancer Family         anterior wall of urinanry bladder    Breast cancer Family     Other Family         brain tumor    Breast cancer Cousin 21    Brain cancer Father     Skin cancer Brother     Skin cancer Brother     Diabetes Son     Neuropathy Son     No Known Problems Son      I have reviewed and agree with the history as documented  E-Cigarette/Vaping     E-Cigarette/Vaping Substances     Social History     Tobacco Use    Smoking status: Current Every Day Smoker     Packs/day: 0 50     Years: 49 00     Pack years: 24 50     Types: Cigarettes    Smokeless tobacco: Never Used    Tobacco comment: 45+ years per allscripts   Substance Use Topics    Alcohol use: No    Drug use: No       Review of Systems   Constitutional: Negative for chills, diaphoresis and fever  HENT: Negative for congestion, ear pain, rhinorrhea, sinus pain and sore throat  Eyes: Negative for pain and visual disturbance  Respiratory: Positive for chest tightness  Negative for cough and shortness of breath  Cardiovascular: Positive for chest pain  Negative for palpitations  Gastrointestinal: Positive for nausea  Negative for abdominal pain, diarrhea and vomiting  Genitourinary: Negative for dysuria, frequency, hematuria and urgency  Musculoskeletal: Negative for arthralgias, back pain and neck pain  Skin: Negative for color change and rash  Neurological: Positive for dizziness  Negative for seizures, syncope, weakness and light-headedness  Psychiatric/Behavioral: The patient is nervous/anxious  All other systems reviewed and are negative  Physical Exam  Physical Exam  Vitals and nursing note reviewed  Constitutional:       General: She is not in acute distress  Appearance: Normal appearance  She is well-developed  HENT:      Head: Normocephalic and atraumatic        Right Ear: External ear normal       Left Ear: External ear normal       Nose: Nose normal       Mouth/Throat: Mouth: Mucous membranes are moist       Pharynx: Oropharynx is clear  Eyes:      Conjunctiva/sclera: Conjunctivae normal       Pupils: Pupils are equal, round, and reactive to light  Cardiovascular:      Rate and Rhythm: Normal rate and regular rhythm  Pulses:           Radial pulses are 2+ on the right side and 2+ on the left side  Dorsalis pedis pulses are 2+ on the right side and 2+ on the left side  Heart sounds: Normal heart sounds  No murmur heard  Pulmonary:      Effort: Pulmonary effort is normal  No respiratory distress  Breath sounds: Normal breath sounds  No decreased breath sounds or wheezing  Abdominal:      General: Bowel sounds are normal       Palpations: Abdomen is soft  Tenderness: There is no abdominal tenderness  There is no guarding or rebound  Musculoskeletal:         General: Normal range of motion  Cervical back: Neck supple  Right lower leg: No edema  Left lower leg: No edema  Lymphadenopathy:      Cervical: No cervical adenopathy  Skin:     General: Skin is warm and dry  Neurological:      General: No focal deficit present  Mental Status: She is alert and oriented to person, place, and time  Sensory: Sensation is intact  Motor: Motor function is intact  No weakness  Psychiatric:         Attention and Perception: Attention normal          Mood and Affect: Mood is anxious  Behavior: Behavior normal  Behavior is cooperative  Thought Content:  Thought content normal          Judgment: Judgment normal          Vital Signs  ED Triage Vitals [08/26/21 0238]   Temperature Pulse Respirations Blood Pressure SpO2   98 1 °F (36 7 °C) 88 22 153/79 98 %      Temp Source Heart Rate Source Patient Position - Orthostatic VS BP Location FiO2 (%)   Oral Monitor Sitting Right arm --      Pain Score       7           Vitals:    08/26/21 0424 08/26/21 0447 08/26/21 0617 08/26/21 0709   BP: 154/66 156/74 129/67 131/67 Pulse: 65 68 58 59   Patient Position - Orthostatic VS: Sitting Sitting Lying          Visual Acuity      ED Medications  Medications   sodium chloride (PF) 0 9 % injection 3 mL (has no administration in time range)   sodium chloride 0 9 % bolus 500 mL (0 mL Intravenous Stopped 8/26/21 0427)   Lidocaine Viscous HCl (XYLOCAINE) 2 % mucosal solution 15 mL (15 mL Swish & Swallow Given 8/26/21 0307)   aluminum-magnesium hydroxide-simethicone (MYLANTA) oral suspension 30 mL (30 mL Oral Given 8/26/21 0308)   aspirin chewable tablet 243 mg (243 mg Oral Given 8/26/21 0306)       Diagnostic Studies  Results Reviewed     Procedure Component Value Units Date/Time    Troponin I repeat in 3hrs [[id number]]  (Normal) Collected: 08/26/21 0616    Lab Status: Final result Specimen: Blood from Arm, Right Updated: 08/26/21 0641     Troponin I <0 03 ng/mL     Troponin I [[id number]]  (Normal) Collected: 08/26/21 0257    Lab Status: Final result Specimen: Blood from Arm, Left Updated: 08/26/21 0327     Troponin I <0 03 ng/mL     Basic metabolic panel [[id number]] Collected: 08/26/21 0257    Lab Status: Final result Specimen: Blood from Arm, Left Updated: 08/26/21 0323     Sodium 141 mmol/L      Potassium 4 0 mmol/L      Chloride 106 mmol/L      CO2 25 mmol/L      ANION GAP 10 mmol/L      BUN 10 mg/dL      Creatinine 0 84 mg/dL      Glucose 99 mg/dL      Calcium 9 9 mg/dL      eGFR 74 ml/min/1 73sq m     Narrative:      [place] guidelines for Chronic Kidney Disease (CKD):     Stage 1 with normal or high GFR (GFR > 90 mL/min/1 73 square meters)    Stage 2 Mild CKD (GFR = 60-89 mL/min/1 73 square meters)    Stage 3A Moderate CKD (GFR = 45-59 mL/min/1 73 square meters)    Stage 3B Moderate CKD (GFR = 30-44 mL/min/1 73 square meters)    Stage 4 Severe CKD (GFR = 15-29 mL/min/1 73 square meters)    Stage 5 End Stage CKD (GFR <15 mL/min/1 73 square meters)  Note: GFR calculation is accurate only with a steady state creatinine    CBC and differential [[id number]]  (Normal) Collected: 08/26/21 0257    Lab Status: Final result Specimen: Blood from Arm, Left Updated: 08/26/21 0318     WBC 9 04 Thousand/uL      RBC 4 98 Million/uL      Hemoglobin 14 8 g/dL      Hematocrit 45 0 %      MCV 90 fL      MCH 29 7 pg      MCHC 32 9 g/dL      RDW 14 0 %      MPV 10 1 fL      Platelets 515 Thousands/uL      Neutrophils Relative 59 %      Immat GRANS % 0 %      Lymphocytes Relative 26 %      Monocytes Relative 10 %      Eosinophils Relative 4 %      Basophils Relative 1 %      Neutrophils Absolute 5 37 Thousands/µL      Immature Grans Absolute 0 02 Thousand/uL      Lymphocytes Absolute 2 36 Thousands/µL      Monocytes Absolute 0 90 Thousand/µL      Eosinophils Absolute 0 34 Thousand/µL      Basophils Absolute 0 05 Thousands/µL                  X-ray chest 1 view portable   ED Interpretation by Mehdi Brice PA-C (08/26 0402)   No acute cardiopulmonary abnormalities                 Procedures  ECG 12 Lead Documentation Only    Date/Time: 8/26/2021 4:57 AM  Performed by: Mehdi Brice PA-C  Authorized by: Mehdi Brice PA-C     Indications / Diagnosis:  Chest pain  ECG reviewed by me, the ED Provider: yes    Patient location:  ED  Previous ECG:     Previous ECG:  Unavailable    Comparison to cardiac monitor: Yes    Interpretation:     Interpretation: abnormal    Rate:     ECG rate:  98    ECG rate assessment: normal    Rhythm:     Rhythm: sinus rhythm    Ectopy:     Ectopy: none    QRS:     QRS axis:  Normal    QRS intervals:  Normal  Conduction:     Conduction: normal    ST segments:     ST segments:  Abnormal    Depression:  II, III and aVF  T waves:     T waves: normal    Other findings:     Other findings: LAE               ED Course  ED Course as of Aug 26 0714   Thu Aug 26, 2021   0421 Troponin I: <0 03   0504 Patient admits to relief of chest pain following GI cocktail but still has complaint of dizziness      0646 Repeat normal   Troponin I: <0 03             HEART Risk Score      Most Recent Value   Heart Score Risk Calculator   History  1 Filed at: 08/26/2021 0306   ECG  2 Filed at: 08/26/2021 9651   Age  2 Filed at: 08/26/2021 5043   Risk Factors  2 Filed at: 08/26/2021 0306   Troponin  0 Filed at: 08/26/2021 0306   HEART Score  7 Filed at: 08/26/2021 9981                      SBIRT 20yo+      Most Recent Value   SBIRT (25 yo +)   In order to provide better care to our patients, we are screening all of our patients for alcohol and drug use  Would it be okay to ask you these screening questions? No Filed at: 08/26/2021 0242   Initial Alcohol Screen: US AUDIT-C    1  How often do you have a drink containing alcohol?  0 Filed at: 08/26/2021 0242   2  How many drinks containing alcohol do you have on a typical day you are drinking? 0 Filed at: 08/26/2021 0242   3a  Male UNDER 65: How often do you have five or more drinks on one occasion? 0 Filed at: 08/26/2021 0242   3b  FEMALE Any Age, or MALE 65+: How often do you have 4 or more drinks on one occassion? 0 Filed at: 08/26/2021 0242   Audit-C Score  0 Filed at: 08/26/2021 9423   MONICA: How many times in the past year have you    Used an illegal drug or used a prescription medication for non-medical reasons?   Never Filed at: 08/26/2021 0242                    MDM  Number of Diagnoses or Management Options  Chest pain  Diagnosis management comments: Patient is a 79-year-old female presents to the ED with complaint of chest pain x1 hour  Examination was unremarkable    Initial troponin is negative  - Repeat troponin 3 hours also negative  CBC and CMP were within normal limits  Chest x-ray showed no acute cardiopulmonary abnormality  HEART score of 7  Patient had resolution of pain following GI cocktail but still admits to mild lightheadedness    Patient advised that due to her heart score of 7 and history of MI with current risk factors that she stay in the hospital for observation  Patient requesting to leave and agreed to sign AMA forms  Advised to continue follow-up with cardiologist for continued management of intermittent bouts of chest pain  Advised follow-up with primary care as needed  Advised to return to the ED with worsening symptoms as discussed with the patient  Patient was stable at time of discharge             Amount and/or Complexity of Data Reviewed  Clinical lab tests: ordered and reviewed  Tests in the radiology section of CPT®: ordered and reviewed  Independent visualization of images, tracings, or specimens: yes    Patient Progress  Patient progress: stable      Disposition  Final diagnoses:   Chest pain     Time reflects when diagnosis was documented in both MDM as applicable and the Disposition within this note     Time User Action Codes Description Comment    8/26/2021  7:05 AM Cheryal Payment Add [R07 9] Chest pain       ED Disposition     ED Disposition Condition Date/Time Comment    ISRAEL  david Aug 26, 2021  7:05 AM Date: 8/26/2021  Patient: Marvin Campuzano  Admitted: 8/26/2021  2:39 AM  Attending Provider: Collin Forrest MD    Marvin Campuzano or her authorized caregiver has made the decision for the patient to leave the emergency department against the advice  of her attending physician  She or her authorized caregiver has been informed and understands the inherent risks, including death, disability, heart attack, stroke  She or her authorized caregiver has decided to accept the responsibility for this d ecision  Lizbeth Aranda and all necessary parties have been advised that she may return for further evaluation or treatment  Her condition at time of discharge was stable    Lizbeth Aranda had current vital signs as follows:  /67 (BP Location: L eft arm)   Pulse 58   Temp 98 1 °F (36 7 °C) (Oral)   Resp 18   Ht 5' 2" (1 575 m)   Wt 73 kg (161 lb)         Follow-up Information     Follow up With Specialties Details Why Contact Info Additional Information    Gilson Tamayo MD Family Medicine, Sports Medicine Schedule an appointment as soon as possible for a visit  As needed [address] [facility] [phone]       [address] Emergency Department Emergency Medicine Go to  As needed, If symptoms worsen [address] [facility],[phone]  [address] [facility] Emergency Department, [address], [address] [place] [address]          Patient's Medications   Discharge Prescriptions    No medications on file     No discharge procedures on file      PDMP Review     None          ED Provider  Electronically Signed by           Cheng Cagle PA-C  08/26/21 1096

## 2021-08-26 NOTE — DISCHARGE INSTRUCTIONS
Continue follow-up with your cardiologist for further evaluation management of intermittent chest pain  Follow-up with primary care as needed  Return to ED with worsening symptoms going worsening chest pain, shortness of breath, fever/chills, lightheadedness and dizziness, passing out

## 2021-08-27 ENCOUNTER — TELEPHONE (OUTPATIENT)
Dept: PULMONOLOGY | Facility: MEDICAL CENTER | Age: 66
End: 2021-08-27

## 2021-08-27 DIAGNOSIS — R91.8 LUNG NODULES: Primary | ICD-10-CM

## 2021-08-27 NOTE — TELEPHONE ENCOUNTER
I did discuss the results of the chest CT scan with the patient  She has not previously had tissue biopsy and is under the impression that  The largest of the nodules was a fatty type of tissue  Did discuss tissue biopsy as need for definitive diagnosis  2nd best imaging modality is to  Repeat PET-CT imaging for glucose avid ED given that this was previously mildly positive in 2018  The patient is agreeable to this over tissue biopsy  Discussed that I would  Discussed the results with her upon receipt of gaining PET-CT scan results  CT CHEST WITHOUT IV CONTRAST     INDICATION:   R91 8: Other nonspecific abnormal finding of lung field      COMPARISON:  CT chest dated 6/27/2019  CT abdomen/pelvis dated 5/9/2016      TECHNIQUE: CT examination of the chest was performed without intravenous contrast   Axial, sagittal, and coronal 2D reformatted images were created from the source data and submitted for interpretation      Radiation dose length product (DLP) for this visit:  196 45 mGy-cm   This examination, like all CT scans performed in the [facility], was performed utilizing techniques to minimize radiation dose exposure, including the use of iterative   reconstruction and automated exposure control       FINDINGS:     LUNGS:  Mild centrilobular and paraseptal emphysematous changes are seen  Again seen is a 1 5 x 1 4 cm right lower lobe nodule (image 77, series 3), not significantly changed  There is a 4 mm right lower lobe nodule (image 79, series 3), new from the   previous examination  3 mm left upper lobe nodule (image 17, series 3) is stable  Calcified granuloma is noted in the left lower lobe    There is no tracheal or endobronchial lesion      PLEURA:  Unremarkable      HEART/GREAT VESSELS:  Coronary artery calcification is noted      MEDIASTINUM AND ELMIRA:  Unremarkable      CHEST WALL AND LOWER NECK:   Unremarkable      VISUALIZED STRUCTURES IN THE UPPER ABDOMEN:  8 mm cyst redemonstrated in the liver      OSSEOUS STRUCTURES:  No acute fracture or destructive osseous lesion      IMPRESSION:     Mild emphysematous changes  Stable 1 5 x 1 4 cm right lower lobe nodule when compared to the most recent prior examination  Very mild interval growth has been demonstrated from 2016 where it measured 1 3 x 1 1 cm      New 4 mm right lower lobe nodule   Based on current Fleischner Society 2017 Guidelines on incidental pulmonary nodule, if the patient is considered high risk for lung cancer, 12 month follow-up non-contrast chest CT is recommended      The study was marked in [facility] for significant notification            Workstation performed: [id number]

## 2021-08-27 NOTE — TELEPHONE ENCOUNTER
Rob Coon from [facility] Radiology calling with significant findings on CT of chest done on 8/25

## 2021-09-24 ENCOUNTER — HOSPITAL ENCOUNTER (OUTPATIENT)
Dept: RADIOLOGY | Age: 66
Discharge: HOME | End: 2021-09-24
Payer: COMMERCIAL

## 2021-09-24 DIAGNOSIS — D38.1 NEOPLASM OF UNCERTAIN BEHAVIOR OF RIGHT LOWER LOBE OF LUNG: ICD-10-CM

## 2021-09-24 LAB — GLUCOSE SERPL-MCNC: 91 MG/DL (ref 65–140)

## 2021-09-24 PROCEDURE — 82948 REAGENT STRIP/BLOOD GLUCOSE: CPT

## 2021-09-24 PROCEDURE — A9552 F18 FDG: HCPCS

## 2021-09-24 PROCEDURE — 78815 PET IMAGE W/CT SKULL-THIGH: CPT

## 2021-09-24 PROCEDURE — G1004 CDSM NDSC: HCPCS

## 2021-10-19 ENCOUNTER — OFFICE VISIT (OUTPATIENT)
Dept: PULMONOLOGY | Facility: MEDICAL CENTER | Age: 66
End: 2021-10-19
Payer: COMMERCIAL

## 2021-10-19 VITALS
RESPIRATION RATE: 12 BRPM | BODY MASS INDEX: 29.63 KG/M2 | HEIGHT: 62 IN | SYSTOLIC BLOOD PRESSURE: 140 MMHG | WEIGHT: 161 LBS | DIASTOLIC BLOOD PRESSURE: 70 MMHG | OXYGEN SATURATION: 95 % | HEART RATE: 85 BPM

## 2021-10-19 DIAGNOSIS — F17.210 CIGARETTE NICOTINE DEPENDENCE WITHOUT COMPLICATION: ICD-10-CM

## 2021-10-19 DIAGNOSIS — M54.40 BACK PAIN OF LUMBAR REGION WITH SCIATICA: ICD-10-CM

## 2021-10-19 DIAGNOSIS — R91.8 LUNG NODULES: Primary | ICD-10-CM

## 2021-10-19 DIAGNOSIS — G47.33 OSA (OBSTRUCTIVE SLEEP APNEA): ICD-10-CM

## 2021-10-19 DIAGNOSIS — J43.2 CENTRILOBULAR EMPHYSEMA (HCC): ICD-10-CM

## 2021-10-19 PROCEDURE — 99214 OFFICE O/P EST MOD 30 MIN: CPT | Performed by: INTERNAL MEDICINE

## 2021-10-19 RX ORDER — ATORVASTATIN CALCIUM 40 MG/1
40 TABLET, FILM COATED ORAL DAILY
COMMUNITY
Start: 2021-09-28

## 2021-10-22 ENCOUNTER — TELEPHONE (OUTPATIENT)
Dept: PULMONOLOGY | Facility: CLINIC | Age: 66
End: 2021-10-22

## 2021-10-29 ENCOUNTER — TELEPHONE (OUTPATIENT)
Dept: PULMONOLOGY | Facility: MEDICAL CENTER | Age: 66
End: 2021-10-29

## 2021-10-29 DIAGNOSIS — M54.40 BACK PAIN OF LUMBAR REGION WITH SCIATICA: Primary | ICD-10-CM

## 2022-01-12 ENCOUNTER — OFFICE VISIT (OUTPATIENT)
Dept: URGENT CARE | Facility: CLINIC | Age: 67
End: 2022-01-12
Payer: COMMERCIAL

## 2022-01-12 VITALS
BODY MASS INDEX: 29.44 KG/M2 | OXYGEN SATURATION: 97 % | WEIGHT: 160 LBS | TEMPERATURE: 97.6 F | HEART RATE: 112 BPM | RESPIRATION RATE: 16 BRPM | HEIGHT: 62 IN

## 2022-01-12 DIAGNOSIS — J06.9 VIRAL UPPER RESPIRATORY TRACT INFECTION: Primary | ICD-10-CM

## 2022-01-12 DIAGNOSIS — Z20.822 ENCOUNTER FOR SCREENING LABORATORY TESTING FOR COVID-19 VIRUS: ICD-10-CM

## 2022-01-12 PROCEDURE — 99213 OFFICE O/P EST LOW 20 MIN: CPT | Performed by: NURSE PRACTITIONER

## 2022-01-12 PROCEDURE — U0003 INFECTIOUS AGENT DETECTION BY NUCLEIC ACID (DNA OR RNA); SEVERE ACUTE RESPIRATORY SYNDROME CORONAVIRUS 2 (SARS-COV-2) (CORONAVIRUS DISEASE [COVID-19]), AMPLIFIED PROBE TECHNIQUE, MAKING USE OF HIGH THROUGHPUT TECHNOLOGIES AS DESCRIBED BY CMS-2020-01-R: HCPCS | Performed by: NURSE PRACTITIONER

## 2022-01-12 PROCEDURE — S9083 URGENT CARE CENTER GLOBAL: HCPCS | Performed by: NURSE PRACTITIONER

## 2022-01-12 PROCEDURE — U0005 INFEC AGEN DETEC AMPLI PROBE: HCPCS | Performed by: NURSE PRACTITIONER

## 2022-01-12 NOTE — PATIENT INSTRUCTIONS
--Rest, drink plenty of fluids  --COVID testing sent  Will call with results  Average turn around time is 24-48 hours  --Advise self-quarantining until you hear back from us regarding test results (at the earliest)  This involves not leaving your house, wearing a mask at all times while outside your immediate living area, and disinfecting all commonly used surfaces and personal items  If your COVID test results are positive, you will need to self-quarantine at home for at least 5 days from the onset of your symptoms, until you are feeling better, and until you are without a fever for at least 24 hours  --For nasal/sinus congestion, helpful measures include steam, warm compresses, an OTC saline nasal spray or Neti pot, or an OTC decongestant (such as Sudafed)  The decongestant should be avoided, however, if you are under 10years of age, or have a history of high blood pressure or heart disease  In addition, an OTC nasal steroid (Flonase, Nasocort) or nasal decongestant (Afrin, Luis Alfredo-synephrine) may be taken  The nasal steroid should be used at bedtime, after the saline nasal spray  The nasal decongestant should not be taken more than 3 days consecutively in order to prevent rebound congestion  --For nasal drainage, postnasal drip, sneezing and itching, an OTC antihistamine (Allegra, Benadryl, etc) can be taken  --For cough, you can take an OTC expectorant such as plain Robitussion or Mucinex (active ingredient guaifenesin)  A spoonful of honey at bedtime may also be helpful, as may a prescription cough medicine  Also recommended is the use of a cool mist humidifier (with or without Vicks) in the bedroom at night  --For sore throat, you can take OTC lozenges, use warm gargles (salt water or apple cider vinegar and honey), herbal teas, or an OTC throat spray (Chloraseptic)  --You can take Tylenol or Motrin/Advil as needed for fever, headache, body aches   Motrin/Advil should be avoided, however, if you have a history of heart disease, bleeding ulcers, or if you take blood thinners  --You should contact your primary care provider and/or go to the ER if your symptoms are not improved or get worse over the next 7 days  This includes new onset fever, localized ear pain, sinus pain, as well as worsening cough, chest pain, shortness of breath, or significant weakness/fatigue

## 2022-01-12 NOTE — PROGRESS NOTES
[facility] [employer] [facility]        NAME: Muna Salazar is a 61 y o  female  : 1955    MRN: [id number]  DATE: 2022  TIME: 3:28 PM    Assessment and Plan   Viral upper respiratory tract infection [J06 9]  1  Viral upper respiratory tract infection     2  Encounter for screening laboratory testing for COVID-19 virus  Novel Coronavirus (Covid-19),PCR [facility]     --Denies need for anti-emetic at this time  Patient Instructions     --Rest, drink plenty of fluids  --COVID testing sent  Will call with results  Average turn around time is 24-48 hours  --Advise self-quarantining until you hear back from us regarding test results (at the earliest)  This involves not leaving your house, wearing a mask at all times while outside your immediate living area, and disinfecting all commonly used surfaces and personal items  If your COVID test results are positive, you will need to self-quarantine at home for at least 5 days from the onset of your symptoms, until you are feeling better, and until you are without a fever for at least 24 hours  --For nasal/sinus congestion, helpful measures include steam, warm compresses, an OTC saline nasal spray or Neti pot, or an OTC decongestant (such as Sudafed)  The decongestant should be avoided, however, if you are under 10years of age, or have a history of high blood pressure or heart disease  In addition, an OTC nasal steroid (Flonase, Nasocort) or nasal decongestant (Afrin, Luis Alfredo-synephrine) may be taken  The nasal steroid should be used at bedtime, after the saline nasal spray  The nasal decongestant should not be taken more than 3 days consecutively in order to prevent rebound congestion  --For nasal drainage, postnasal drip, sneezing and itching, an OTC antihistamine (Allegra, Benadryl, etc) can be taken  --For cough, you can take an OTC expectorant such as plain Robitussion or Mucinex (active ingredient guaifenesin)    A spoonful of honey at bedtime may also be helpful, as may a prescription cough medicine  Also recommended is the use of a cool mist humidifier (with or without Vicks) in the bedroom at night  --For sore throat, you can take OTC lozenges, use warm gargles (salt water or apple cider vinegar and honey), herbal teas, or an OTC throat spray (Chloraseptic)  --You can take Tylenol or Motrin/Advil as needed for fever, headache, body aches  Motrin/Advil should be avoided, however, if you have a history of heart disease, bleeding ulcers, or if you take blood thinners  --You should contact your primary care provider and/or go to the ER if your symptoms are not improved or get worse over the next 7 days  This includes new onset fever, localized ear pain, sinus pain, as well as worsening cough, chest pain, shortness of breath, or significant weakness/fatigue  Chief Complaint     Chief Complaint   Patient presents with    Headache     x 6 days, cough, runny nose, body aches oc Tylenol         History of Present Illness       Here with complaints of headache, nasal congestion, rhinorrhea, cough, body aches, eyes/nose burning, nausea x 6 months  Initial diarrhea which has since resolved  No vomiting  Mild sore throat  No dyspnea (above baseline COPD)  Hasn't needed to use inhaler  No known sick contacts  No flu or COVID vaccine  Taking Tylenol only  Review of Systems   Review of Systems   Constitutional: Negative for fever  HENT: Positive for rhinorrhea and sore throat  Respiratory: Positive for cough  Negative for shortness of breath  Gastrointestinal: Positive for nausea  Negative for abdominal pain, diarrhea and vomiting  Musculoskeletal: Negative for myalgias  Neurological: Negative for headaches           Current Medications       Current Outpatient Medications:     aspirin 81 mg chewable tablet, Chew 81 mg daily, Disp: , Rfl:     atorvastatin (LIPITOR) 20 mg tablet, Take 30 mg by mouth daily, Disp: , Rfl:    atorvastatin (LIPITOR) 40 mg tablet, Take 40 mg by mouth daily  , Disp: , Rfl:     ezetimibe (ZETIA) 10 mg tablet, Take 10 mg by mouth daily, Disp: , Rfl:     ketorolac (ACULAR) 0 5 % ophthalmic solution, instill 1 drop into right eye four times a day (Patient not taking: Reported on 10/19/2021), Disp: , Rfl:     polymyxin b-trimethoprim (POLYTRIM) ophthalmic solution, instill 1 drop into left eye four times a day (Patient not taking: Reported on 10/19/2021), Disp: , Rfl:     prednisoLONE acetate (PRED FORTE) 1 % ophthalmic suspension, instill 1 drop into right eye four times a day (Patient not taking: Reported on 10/19/2021), Disp: , Rfl:     rosuvastatin (CRESTOR) 20 MG tablet, Take 20 mg by mouth daily (Patient not taking: Reported on 8/26/2021), Disp: , Rfl:     Current Allergies     Allergies as of 01/12/2022 - Reviewed 01/12/2022   Allergen Reaction Noted    Erythromycin Hives and Itching 10/21/2015    Nizatidine Other (See Comments) 06/25/2008    Penicillins Hives and Itching 06/25/2008    Wellbutrin [bupropion] Other (See Comments) 03/01/2016    Ciprofloxacin hcl Hives 03/01/2016    Fish-derived products - food allergy GI Intolerance 03/01/2016    Other Hives 03/01/2016    Ampicillin  01/14/2013    Benadryl [diphenhydramine hcl] Other (See Comments) 03/01/2016    Ciprofloxacin  06/25/2008    Clindamycin  04/11/2016    Diphenhydramine  06/25/2008    Doxycycline  01/19/2018    Lovastatin  09/25/2008    Nickel Other (See Comments) 09/10/2008    Shellfish-derived products - food allergy GI Intolerance 03/07/2016            The following portions of the patient's history were reviewed and updated as appropriate: allergies, current medications, past family history, past medical history, past social history, past surgical history and problem list      Past Medical History:   Diagnosis Date    Acid reflux     Anxiety     Arthritis     Cancer ([address] )     skin on chin    Cardiac disease     Chronic kidney disease     (R) kidney smaller than (L),  kidney stones    Chronic UTI (urinary tract infection)     Colitis     microscopic    COPD (chronic obstructive pulmonary disease) (HCC)     mild, recent dx    Depression     Epidermal cyst of face     last assessed 10-    Heart attack ([facility] [address] ) 09/20/2018    [place] (hard of hearing)     deaf (L) ear, decreased hearing (R) ear    Hyperlipemia     diet controlled    Hyperlipidemia     IBS (irritable bowel syndrome)     Malignant neoplasm of skin     Meniere disease     Myocardial infarction ([facility] [address] ) 09/2018    Numbness and tingling of left side of face     Uterine leiomyoma        Past Surgical History:   Procedure Laterality Date    CARDIAC CATHETERIZATION      [facility]/21/18 [facility] - severe CAD of the LAD and diagonal artery    COLONOSCOPY N/A 11/4/2016    Procedure: COLONOSCOPY;  Surgeon: Olivia Kurtz MD;  Location: [place] [address] GI LAB; Service:     ERCP      ESOPHAGOGASTRODUODENOSCOPY N/A 11/4/2016    Procedure: ESOPHAGOGASTRODUODENOSCOPY (EGD); Surgeon: Olivia Kurtz MD;  Location: [facility] GI LAB; Service:     EYE SURGERY      NM XCAPSL CTRC RMVL INSJ IO LENS PROSTH W/O ECP Left 3/7/2016    Procedure: EXTRACTION EXTRACAPSULAR CATARACT PHACO INTRAOCULAR LENS (IOL);   Surgeon: Irene Wilhelm MD;  Location: [facility] MAIN OR;  Service: Ophthalmology    SKIN CANCER EXCISION      excision skin cancer on chin       Family History   Problem Relation Age of Onset    Heart disease Mother         cardiac disorder    Lung cancer Mother     COPD Mother     Colonic polyp Sister     Thyroid cancer Sister     Osteoporosis Family     Colon cancer Family     Heart disease Family         cardiac disorder    Heart disease Family         cardiac disorder    Stroke Family     Arthritis Family     Colon cancer Family     Lung cancer Family     Cancer Family         anterior wall of urinanry bladder    Breast cancer Family     Other Family brain tumor    Breast cancer Cousin 21    Brain cancer Father     Skin cancer Brother     Skin cancer Brother     Diabetes Son     Neuropathy Son     No Known Problems Son          Medications have been verified  Objective   Pulse (!) 112   Temp 97 6 °F (36 4 °C) (Temporal)   Resp 16   Ht 5' 2" (1 575 m)   Wt 72 6 kg (160 lb)   SpO2 97%   BMI 29 26 kg/m²   No LMP recorded  Patient is postmenopausal        Physical Exam     Physical Exam  Constitutional:       General: She is not in acute distress  Appearance: Normal appearance  She is well-developed  She is not ill-appearing, toxic-appearing or diaphoretic  HENT:      Head: Normocephalic  Right Ear: Tympanic membrane, ear canal and external ear normal       Left Ear: Tympanic membrane, ear canal and external ear normal       Nose: Congestion and rhinorrhea present  Mouth/Throat:      Pharynx: No oropharyngeal exudate or posterior oropharyngeal erythema  Eyes:      General:         Right eye: No discharge  Left eye: No discharge  Cardiovascular:      Rate and Rhythm: Normal rate and regular rhythm  Heart sounds: Normal heart sounds  No murmur heard  Pulmonary:      Effort: Pulmonary effort is normal  No respiratory distress  Breath sounds: Normal breath sounds  No stridor  No wheezing, rhonchi or rales  Chest:      Chest wall: No tenderness  Lymphadenopathy:      Cervical: No cervical adenopathy  Skin:     General: Skin is warm and dry  Neurological:      Mental Status: She is alert and oriented to person, place, and time  Deep Tendon Reflexes: Reflexes are normal and symmetric     Psychiatric:         Mood and Affect: Mood normal

## 2022-01-13 LAB — SARS-COV-2 RNA RESP QL NAA+PROBE: POSITIVE

## 2022-01-13 NOTE — RESULT ENCOUNTER NOTE
I spoke with Markus Clay and let her know that her COVID-19 swab was positive  Continue symptomatic treatment  Advised she implement home isolation measures including      Staying home  Stay in a specific "sick room" or area and away from other people or animals, including pets  Wear a mask when leaving your room  Use a separate bathroom, if available  Wipe down all commonly touched surfaces with household

## 2022-08-01 ENCOUNTER — OFFICE VISIT (OUTPATIENT)
Dept: URGENT CARE | Facility: CLINIC | Age: 67
End: 2022-08-01
Payer: COMMERCIAL

## 2022-08-01 VITALS
TEMPERATURE: 97.8 F | WEIGHT: 165 LBS | SYSTOLIC BLOOD PRESSURE: 171 MMHG | DIASTOLIC BLOOD PRESSURE: 78 MMHG | OXYGEN SATURATION: 96 % | HEIGHT: 62 IN | HEART RATE: 112 BPM | RESPIRATION RATE: 16 BRPM | BODY MASS INDEX: 30.36 KG/M2

## 2022-08-01 DIAGNOSIS — M26.629 TMJ SYNDROME: Primary | ICD-10-CM

## 2022-08-01 PROCEDURE — S9083 URGENT CARE CENTER GLOBAL: HCPCS | Performed by: FAMILY MEDICINE

## 2022-08-01 PROCEDURE — 99213 OFFICE O/P EST LOW 20 MIN: CPT | Performed by: FAMILY MEDICINE

## 2022-08-01 NOTE — PROGRESS NOTES
[address] [facility] [facility]        NAME: Nick Reyes is a 61 y o  female  : 1955    MRN: [id number]  DATE: 2022  TIME: 7:23 PM    Assessment and Plan   TMJ syndrome [M26 629]  1  TMJ syndrome           Patient Instructions     Neck pain likely secondary to TMJ  Patient did not want medication for this  Will used OTC Tylenol for her pain  Follow up with PCP in 3-5 days if no improvement  Proceed to  ER if symptoms worsen  Chief Complaint     Chief Complaint   Patient presents with    Neck Pain     Pt has back neck pain  for about a month now and it is not getting any better  History of Present Illness       Neck Pain   This is a new problem  The current episode started more than 1 month ago  The problem occurs constantly  The problem has been unchanged  The pain is associated with nothing  The pain is present in the left side  The quality of the pain is described as aching  The pain is mild  The symptoms are aggravated by position  The pain is same all the time  Pertinent negatives include no chest pain, fever or headaches  Associated symptoms comments: TMJ  She has tried nothing for the symptoms  Review of Systems   Review of Systems   Constitutional: Negative for chills and fever  HENT: Negative for congestion, ear pain, postnasal drip, sinus pain and sore throat  Eyes: Negative for pain and itching  Respiratory: Negative for cough, shortness of breath and wheezing  Cardiovascular: Negative for chest pain and palpitations  Gastrointestinal: Negative for abdominal pain, constipation, diarrhea, nausea and vomiting  Genitourinary: Negative for difficulty urinating and hematuria  Musculoskeletal: Positive for neck pain  Negative for arthralgias and myalgias  Skin: Negative for rash  Neurological: Negative for dizziness, light-headedness and headaches  Psychiatric/Behavioral: Negative for agitation and sleep disturbance  The patient is not nervous/anxious  Current Medications       Current Outpatient Medications:     aspirin 81 mg chewable tablet, Chew 81 mg daily, Disp: , Rfl:     atorvastatin (LIPITOR) 20 mg tablet, Take 30 mg by mouth daily, Disp: , Rfl:     atorvastatin (LIPITOR) 40 mg tablet, Take 40 mg by mouth daily  , Disp: , Rfl:     ezetimibe (ZETIA) 10 mg tablet, Take 10 mg by mouth daily, Disp: , Rfl:     Current Allergies     Allergies as of 08/01/2022 - Reviewed 08/01/2022   Allergen Reaction Noted    Erythromycin Hives and Itching 10/21/2015    Nizatidine Other (See Comments) 06/25/2008    Penicillins Hives and Itching 06/25/2008    Wellbutrin [bupropion] Other (See Comments) 03/01/2016    Ciprofloxacin hcl Hives 03/01/2016    Fish-derived products - food allergy GI Intolerance 03/01/2016    Other Hives 03/01/2016    Ampicillin  01/14/2013    Benadryl [diphenhydramine hcl] Other (See Comments) 03/01/2016    Ciprofloxacin  06/25/2008    Clindamycin  04/11/2016    Diphenhydramine  06/25/2008    Doxycycline  01/19/2018    Lovastatin  09/25/2008    Nickel Other (See Comments) 09/10/2008    Shellfish-derived products - food allergy GI Intolerance 03/07/2016            The following portions of the patient's history were reviewed and updated as appropriate: allergies, current medications, past family history, past medical history, past social history, past surgical history and problem list      Past Medical History:   Diagnosis Date    Acid reflux     Anxiety     Arthritis     Cancer [address] [address] )     skin on chin    Cardiac disease     Chronic kidney disease     (R) kidney smaller than (L),  kidney stones    Chronic UTI (urinary tract infection)     Colitis     microscopic    COPD (chronic obstructive pulmonary disease) ([address] )     mild, recent dx    Depression     Epidermal cyst of face     last assessed 10-    Heart attack ([facility] [address] ) 09/20/2018    [place] (hard of hearing)     deaf (L) ear, decreased hearing (R) ear    Hyperlipemia     diet controlled    Hyperlipidemia     IBS (irritable bowel syndrome)     Malignant neoplasm of skin     Meniere disease     Myocardial infarction ([facility] [address] ) 09/2018    Numbness and tingling of left side of face     Uterine leiomyoma        Past Surgical History:   Procedure Laterality Date    CARDIAC CATHETERIZATION      9/21/18 [facility] - severe CAD of the LAD and diagonal artery    COLONOSCOPY N/A 11/4/2016    Procedure: COLONOSCOPY;  Surgeon: Adali Montague MD;  Location: [facility] GI LAB; Service:     ERCP      ESOPHAGOGASTRODUODENOSCOPY N/A 11/4/2016    Procedure: ESOPHAGOGASTRODUODENOSCOPY (EGD); Surgeon: Adali Montague MD;  Location: [facility] GI LAB; Service:     EYE SURGERY      PA XCAPSL CTRC RMVL INSJ IO LENS PROSTH W/O ECP Left 3/7/2016    Procedure: EXTRACTION EXTRACAPSULAR CATARACT PHACO INTRAOCULAR LENS (IOL); Surgeon: Gisselle Branham MD;  Location: [facility] MAIN OR;  Service: Ophthalmology    SKIN CANCER EXCISION      excision skin cancer on chin       Family History   Problem Relation Age of Onset    Heart disease Mother         cardiac disorder    Lung cancer Mother     COPD Mother     Colonic polyp Sister     Thyroid cancer Sister     Osteoporosis Family     Colon cancer Family     Heart disease Family         cardiac disorder    Heart disease Family         cardiac disorder    Stroke Family     Arthritis Family     Colon cancer Family     Lung cancer Family     Cancer Family         anterior wall of urinanry bladder    Breast cancer Family     Other Family         brain tumor    Breast cancer Cousin 21    Brain cancer Father     Skin cancer Brother     Skin cancer Brother     Diabetes Son     Neuropathy Son     No Known Problems Son          Medications have been verified          Objective   BP (!) 171/78   Pulse (!) 112   Temp 97 8 °F (36 6 °C)   Resp 16   Ht 5' 2" (1 575 m)   Wt 74 8 kg (165 lb)   SpO2 96%   BMI 30 18 kg/m²   No LMP recorded  Patient is postmenopausal        Physical Exam     Physical Exam  Vitals reviewed  Constitutional:       General: She is not in acute distress  Appearance: Normal appearance  She is not ill-appearing  HENT:      Head: Normocephalic and atraumatic  Mouth/Throat:      Dentition: No dental tenderness, dental abscesses or gum lesions  Eyes:      Extraocular Movements: Extraocular movements intact  Conjunctiva/sclera: Conjunctivae normal    Neck:      Vascular: No carotid bruit or JVD  Pulmonary:      Effort: Pulmonary effort is normal    Musculoskeletal:      Cervical back: Normal range of motion  No edema or rigidity  Muscular tenderness present  Lymphadenopathy:      Cervical: No cervical adenopathy  Skin:     General: Skin is warm  Neurological:      General: No focal deficit present  Mental Status: She is alert     Psychiatric:         Mood and Affect: Mood normal          Behavior: Behavior normal          Judgment: Judgment normal

## 2022-08-10 ENCOUNTER — HOSPITAL ENCOUNTER (OUTPATIENT)
Dept: CT IMAGING | Facility: HOSPITAL | Age: 67
Discharge: HOME | End: 2022-08-10
Attending: INTERNAL MEDICINE
Payer: COMMERCIAL

## 2022-08-10 DIAGNOSIS — R91.8 LUNG NODULES: ICD-10-CM

## 2022-08-10 PROCEDURE — G1004 CDSM NDSC: HCPCS

## 2022-08-10 PROCEDURE — 71250 CT THORAX DX C-: CPT

## 2022-09-16 ENCOUNTER — HOSPITAL ENCOUNTER (OUTPATIENT)
Facility: HOSPITAL | Age: 67
Setting detail: OBSERVATION
Discharge: HOME | End: 2022-09-17
Attending: EMERGENCY MEDICINE | Admitting: INTERNAL MEDICINE
Payer: COMMERCIAL

## 2022-09-16 ENCOUNTER — APPOINTMENT (EMERGENCY)
Dept: CT IMAGING | Facility: HOSPITAL | Age: 67
End: 2022-09-16
Payer: COMMERCIAL

## 2022-09-16 DIAGNOSIS — R19.7 NAUSEA VOMITING AND DIARRHEA: Primary | ICD-10-CM

## 2022-09-16 DIAGNOSIS — R00.0 SINUS TACHYCARDIA: ICD-10-CM

## 2022-09-16 DIAGNOSIS — R11.2 NAUSEA VOMITING AND DIARRHEA: Primary | ICD-10-CM

## 2022-09-16 DIAGNOSIS — F17.200 SMOKER: ICD-10-CM

## 2022-09-16 DIAGNOSIS — E86.0 DEHYDRATION: ICD-10-CM

## 2022-09-16 PROBLEM — A41.9 SEPSIS (HCC): Status: ACTIVE | Noted: 2022-09-16

## 2022-09-16 PROBLEM — I25.10 CAD (CORONARY ARTERY DISEASE): Status: ACTIVE | Noted: 2022-09-16

## 2022-09-16 LAB
2HR DELTA HS TROPONIN: 1 NG/L
4HR DELTA HS TROPONIN: 2 NG/L
ALBUMIN SERPL BCP-MCNC: 4.8 G/DL (ref 3.5–5)
ALP SERPL-CCNC: 109 U/L (ref 34–104)
ALT SERPL W P-5'-P-CCNC: 28 U/L (ref 7–52)
ANION GAP SERPL CALCULATED.3IONS-SCNC: 9 MMOL/L (ref 4–13)
AST SERPL W P-5'-P-CCNC: 21 U/L (ref 13–39)
ATRIAL RATE: 105 BPM
ATRIAL RATE: 112 BPM
ATRIAL RATE: 129 BPM
BACTERIA UR QL AUTO: NORMAL /HPF
BASOPHILS # BLD MANUAL: 0 THOUSAND/UL (ref 0–0.1)
BASOPHILS NFR MAR MANUAL: 0 % (ref 0–1)
BILIRUB SERPL-MCNC: 0.53 MG/DL (ref 0.2–1)
BILIRUB UR QL STRIP: NEGATIVE
BUN SERPL-MCNC: 15 MG/DL (ref 5–25)
CALCIUM SERPL-MCNC: 10.2 MG/DL (ref 8.4–10.2)
CARDIAC TROPONIN I PNL SERPL HS: 2 NG/L
CARDIAC TROPONIN I PNL SERPL HS: 3 NG/L
CARDIAC TROPONIN I PNL SERPL HS: 4 NG/L
CHLORIDE SERPL-SCNC: 107 MMOL/L (ref 96–108)
CLARITY UR: CLEAR
CO2 SERPL-SCNC: 22 MMOL/L (ref 21–32)
COLOR UR: COLORLESS
CREAT SERPL-MCNC: 0.81 MG/DL (ref 0.6–1.3)
EOSINOPHIL # BLD MANUAL: 0.18 THOUSAND/UL (ref 0–0.4)
EOSINOPHIL NFR BLD MANUAL: 1 % (ref 0–6)
ERYTHROCYTE [DISTWIDTH] IN BLOOD BY AUTOMATED COUNT: 13.1 % (ref 11.6–15.1)
FLUAV RNA RESP QL NAA+PROBE: NEGATIVE
FLUBV RNA RESP QL NAA+PROBE: NEGATIVE
GFR SERPL CREATININE-BSD FRML MDRD: 76 ML/MIN/1.73SQ M
GLUCOSE SERPL-MCNC: 109 MG/DL (ref 65–140)
GLUCOSE UR STRIP-MCNC: NEGATIVE MG/DL
HCT VFR BLD AUTO: 50.4 % (ref 34.8–46.1)
HGB BLD-MCNC: 16.5 G/DL (ref 11.5–15.4)
HGB UR QL STRIP.AUTO: ABNORMAL
KETONES UR STRIP-MCNC: NEGATIVE MG/DL
LACTATE SERPL-SCNC: 2 MMOL/L (ref 0.5–2)
LEUKOCYTE ESTERASE UR QL STRIP: NEGATIVE
LYMPHOCYTES # BLD AUTO: 0.36 THOUSAND/UL (ref 0.6–4.47)
LYMPHOCYTES # BLD AUTO: 2 % (ref 14–44)
MCH RBC QN AUTO: 29.9 PG (ref 26.8–34.3)
MCHC RBC AUTO-ENTMCNC: 32.7 G/DL (ref 31.4–37.4)
MCV RBC AUTO: 91 FL (ref 82–98)
MONOCYTES # BLD AUTO: 0.36 THOUSAND/UL (ref 0–1.22)
MONOCYTES NFR BLD: 2 % (ref 4–12)
NEUTROPHILS # BLD MANUAL: 16.91 THOUSAND/UL (ref 1.85–7.62)
NEUTS SEG NFR BLD AUTO: 95 % (ref 43–75)
NITRITE UR QL STRIP: NEGATIVE
NON-SQ EPI CELLS URNS QL MICRO: NORMAL /HPF
P AXIS: 60 DEGREES
P AXIS: 65 DEGREES
P AXIS: 66 DEGREES
PH UR STRIP.AUTO: 5 [PH]
PLATELET # BLD AUTO: 199 THOUSANDS/UL (ref 149–390)
PLATELET BLD QL SMEAR: ADEQUATE
PMV BLD AUTO: 10 FL (ref 8.9–12.7)
POTASSIUM SERPL-SCNC: 4.4 MMOL/L (ref 3.5–5.3)
PR INTERVAL: 140 MS
PR INTERVAL: 146 MS
PR INTERVAL: 148 MS
PROT SERPL-MCNC: 7.8 G/DL (ref 6.4–8.4)
PROT UR STRIP-MCNC: ABNORMAL MG/DL
QRS AXIS: 49 DEGREES
QRS AXIS: 52 DEGREES
QRS AXIS: 60 DEGREES
QRSD INTERVAL: 72 MS
QRSD INTERVAL: 74 MS
QRSD INTERVAL: 74 MS
QT INTERVAL: 294 MS
QT INTERVAL: 314 MS
QT INTERVAL: 316 MS
QTC INTERVAL: 415 MS
QTC INTERVAL: 430 MS
QTC INTERVAL: 431 MS
RBC # BLD AUTO: 5.52 MILLION/UL (ref 3.81–5.12)
RBC #/AREA URNS AUTO: NORMAL /HPF
RBC MORPH BLD: NORMAL
RSV RNA RESP QL NAA+PROBE: NEGATIVE
SARS-COV-2 RNA RESP QL NAA+PROBE: NEGATIVE
SODIUM SERPL-SCNC: 138 MMOL/L (ref 135–147)
SP GR UR STRIP.AUTO: >=1.05 (ref 1–1.03)
T WAVE AXIS: 33 DEGREES
T WAVE AXIS: 40 DEGREES
T WAVE AXIS: 60 DEGREES
UROBILINOGEN UR STRIP-ACNC: <2 MG/DL
VENTRICULAR RATE: 105 BPM
VENTRICULAR RATE: 112 BPM
VENTRICULAR RATE: 129 BPM
WBC # BLD AUTO: 17.8 THOUSAND/UL (ref 4.31–10.16)
WBC #/AREA URNS AUTO: NORMAL /HPF

## 2022-09-16 PROCEDURE — 96374 THER/PROPH/DIAG INJ IV PUSH: CPT

## 2022-09-16 PROCEDURE — 80053 COMPREHEN METABOLIC PANEL: CPT

## 2022-09-16 PROCEDURE — 96361 HYDRATE IV INFUSION ADD-ON: CPT

## 2022-09-16 PROCEDURE — 81001 URINALYSIS AUTO W/SCOPE: CPT

## 2022-09-16 PROCEDURE — 74177 CT ABD & PELVIS W/CONTRAST: CPT

## 2022-09-16 PROCEDURE — G1004 CDSM NDSC: HCPCS

## 2022-09-16 PROCEDURE — 99285 EMERGENCY DEPT VISIT HI MDM: CPT | Performed by: EMERGENCY MEDICINE

## 2022-09-16 PROCEDURE — 85007 BL SMEAR W/DIFF WBC COUNT: CPT

## 2022-09-16 PROCEDURE — 83605 ASSAY OF LACTIC ACID: CPT | Performed by: PHYSICIAN ASSISTANT

## 2022-09-16 PROCEDURE — 93005 ELECTROCARDIOGRAM TRACING: CPT

## 2022-09-16 PROCEDURE — 85027 COMPLETE CBC AUTOMATED: CPT

## 2022-09-16 PROCEDURE — 84484 ASSAY OF TROPONIN QUANT: CPT

## 2022-09-16 PROCEDURE — 0241U HB NFCT DS VIR RESP RNA 4 TRGT: CPT

## 2022-09-16 PROCEDURE — 87040 BLOOD CULTURE FOR BACTERIA: CPT | Performed by: PHYSICIAN ASSISTANT

## 2022-09-16 PROCEDURE — 36415 COLL VENOUS BLD VENIPUNCTURE: CPT

## 2022-09-16 PROCEDURE — 99285 EMERGENCY DEPT VISIT HI MDM: CPT

## 2022-09-16 RX ORDER — NICOTINE 21 MG/24HR
1 PATCH, TRANSDERMAL 24 HOURS TRANSDERMAL DAILY
Status: DISCONTINUED | OUTPATIENT
Start: 2022-09-17 | End: 2022-09-17 | Stop reason: HOSPADM

## 2022-09-16 RX ORDER — ACETAMINOPHEN 325 MG/1
650 TABLET ORAL ONCE
Status: COMPLETED | OUTPATIENT
Start: 2022-09-16 | End: 2022-09-16

## 2022-09-16 RX ORDER — ONDANSETRON 2 MG/ML
4 INJECTION INTRAMUSCULAR; INTRAVENOUS EVERY 6 HOURS PRN
Status: DISCONTINUED | OUTPATIENT
Start: 2022-09-16 | End: 2022-09-17 | Stop reason: HOSPADM

## 2022-09-16 RX ORDER — NICOTINE 21 MG/24HR
14 PATCH, TRANSDERMAL 24 HOURS TRANSDERMAL ONCE
Status: DISCONTINUED | OUTPATIENT
Start: 2022-09-16 | End: 2022-09-16

## 2022-09-16 RX ORDER — ENOXAPARIN SODIUM 100 MG/ML
40 INJECTION SUBCUTANEOUS DAILY
Status: DISCONTINUED | OUTPATIENT
Start: 2022-09-17 | End: 2022-09-17 | Stop reason: HOSPADM

## 2022-09-16 RX ORDER — NICOTINE 21 MG/24HR
21 PATCH, TRANSDERMAL 24 HOURS TRANSDERMAL ONCE
Status: DISCONTINUED | OUTPATIENT
Start: 2022-09-16 | End: 2022-09-17 | Stop reason: HOSPADM

## 2022-09-16 RX ORDER — ASPIRIN 81 MG/1
81 TABLET, CHEWABLE ORAL DAILY
Status: DISCONTINUED | OUTPATIENT
Start: 2022-09-17 | End: 2022-09-17 | Stop reason: HOSPADM

## 2022-09-16 RX ORDER — ACETAMINOPHEN 325 MG/1
650 TABLET ORAL EVERY 6 HOURS PRN
Status: DISCONTINUED | OUTPATIENT
Start: 2022-09-16 | End: 2022-09-17 | Stop reason: HOSPADM

## 2022-09-16 RX ORDER — ONDANSETRON 2 MG/ML
4 INJECTION INTRAMUSCULAR; INTRAVENOUS ONCE
Status: COMPLETED | OUTPATIENT
Start: 2022-09-16 | End: 2022-09-16

## 2022-09-16 RX ORDER — SODIUM CHLORIDE 9 MG/ML
125 INJECTION, SOLUTION INTRAVENOUS CONTINUOUS
Status: DISCONTINUED | OUTPATIENT
Start: 2022-09-16 | End: 2022-09-17 | Stop reason: HOSPADM

## 2022-09-16 RX ORDER — EZETIMIBE 10 MG/1
10 TABLET ORAL DAILY
Status: DISCONTINUED | OUTPATIENT
Start: 2022-09-17 | End: 2022-09-17 | Stop reason: HOSPADM

## 2022-09-16 RX ADMIN — ONDANSETRON 4 MG: 2 INJECTION INTRAMUSCULAR; INTRAVENOUS at 13:05

## 2022-09-16 RX ADMIN — NICOTINE 21 MG: 21 PATCH, EXTENDED RELEASE TRANSDERMAL at 20:04

## 2022-09-16 RX ADMIN — ACETAMINOPHEN 650 MG: 325 TABLET, FILM COATED ORAL at 22:49

## 2022-09-16 RX ADMIN — SODIUM CHLORIDE 125 ML/HR: 0.9 INJECTION, SOLUTION INTRAVENOUS at 21:37

## 2022-09-16 RX ADMIN — ACETAMINOPHEN 650 MG: 325 TABLET, FILM COATED ORAL at 15:21

## 2022-09-16 RX ADMIN — SODIUM CHLORIDE 1000 ML: 0.9 INJECTION, SOLUTION INTRAVENOUS at 15:25

## 2022-09-16 RX ADMIN — SODIUM CHLORIDE 1000 ML: 0.9 INJECTION, SOLUTION INTRAVENOUS at 13:04

## 2022-09-16 RX ADMIN — SODIUM CHLORIDE 125 ML/HR: 0.9 INJECTION, SOLUTION INTRAVENOUS at 18:38

## 2022-09-16 RX ADMIN — IOHEXOL 100 ML: 350 INJECTION, SOLUTION INTRAVENOUS at 14:10

## 2022-09-16 NOTE — ED PROVIDER NOTES
History  Chief Complaint   Patient presents with    Dizziness     Patient states she started with dizziness, N/V/D this morning around 6 am and it has been getting progressively worse since that time  Marylene Passy is a 29-year-old female presenting to the ED due to nausea/vomiting, diarrhea and dizziness started this morning  Patient reports more than 10 watery, yellow diarrhea, denies blood in the stool  Together with diarrhea patient also experienced diffuse abdominal cramping  She also experienced one episode of vomiting containing food from last dinner/lunch  She also endorses headache, chills and dizziness that she describes as being in her head and likely due to dehydration  She mentioned that yesterday she had lunch (brisket with potatoes) outside and is afraid that her symptoms might be related to the food poisoning  She denies fever, sore throat, sick contacts  Patient is not vaccinated for COVID and her last COVID infection happened in January this year  Patient decided to call EMS because she was afraid of having an MI  She mentioned that couple of years a goshe experiences similar GI symptoms such stomach as vomiting and loose stools and turn out to have severe coronary disease with 80% obstruction  Prior to Admission Medications   Prescriptions Last Dose Informant Patient Reported?  Taking?   aspirin 81 mg chewable tablet  Self Yes No   Sig: Chew 81 mg daily   atorvastatin (LIPITOR) 20 mg tablet  Self Yes No   Sig: Take 30 mg by mouth daily   atorvastatin (LIPITOR) 40 mg tablet  Self Yes No   Sig: Take 40 mg by mouth daily     ezetimibe (ZETIA) 10 mg tablet  Self Yes No   Sig: Take 10 mg by mouth daily      Facility-Administered Medications: None       Past Medical History:   Diagnosis Date    Acid reflux     Anxiety     Arthritis     Cancer (HCC)     skin on chin    Cardiac disease     Chronic kidney disease     (R) kidney smaller than (L),  kidney stones    Chronic UTI (urinary tract infection)     Colitis     microscopic    COPD (chronic obstructive pulmonary disease) (HCC)     mild, recent dx    Depression     Epidermal cyst of face     last assessed 10-    Heart attack ([facility] [address] ) 09/20/2018    [place] (hard of hearing)     deaf (L) ear, decreased hearing (R) ear    Hyperlipemia     diet controlled    Hyperlipidemia     IBS (irritable bowel syndrome)     Malignant neoplasm of skin     Meniere disease     Myocardial infarction ([facility] [address] ) 09/2018    Numbness and tingling of left side of face     Uterine leiomyoma        Past Surgical History:   Procedure Laterality Date    CARDIAC CATHETERIZATION      9/21/18 [facility] - severe CAD of the LAD and diagonal artery    COLONOSCOPY N/A 11/4/2016    Procedure: COLONOSCOPY;  Surgeon: Sofy Baker MD;  Location: [place] [address] GI LAB; Service:     ERCP      ESOPHAGOGASTRODUODENOSCOPY N/A 11/4/2016    Procedure: ESOPHAGOGASTRODUODENOSCOPY (EGD); Surgeon: Sofy Baker MD;  Location: [facility] GI LAB; Service:     EYE SURGERY      KS XCAPSL CTRC RMVL INSJ IO LENS PROSTH W/O ECP Left 3/7/2016    Procedure: EXTRACTION EXTRACAPSULAR CATARACT PHACO INTRAOCULAR LENS (IOL);   Surgeon: Mary Stroud MD;  Location: [facility] MAIN OR;  Service: Ophthalmology    SKIN CANCER EXCISION      excision skin cancer on chin       Family History   Problem Relation Age of Onset    Heart disease Mother         cardiac disorder    Lung cancer Mother     COPD Mother     Colonic polyp Sister     Thyroid cancer Sister     Osteoporosis Family     Colon cancer Family     Heart disease Family         cardiac disorder    Heart disease Family         cardiac disorder    Stroke Family     Arthritis Family     Colon cancer Family     Lung cancer Family     Cancer Family         anterior wall of urinanry bladder    Breast cancer Family     Other Family         brain tumor    Breast cancer Cousin 21    Brain cancer Father     Skin cancer Brother     Skin cancer Brother     Diabetes Son     Neuropathy Son     No Known Problems Son      I have reviewed and agree with the history as documented  E-Cigarette/Vaping     E-Cigarette/Vaping Substances     Social History     Tobacco Use    Smoking status: Current Every Day Smoker     Packs/day: 1 00     Years: 49 00     Pack years: 49 00     Types: Cigarettes    Smokeless tobacco: Never Used    Tobacco comment: 45+ years per allscripts   Substance Use Topics    Alcohol use: No    Drug use: No        Review of Systems   Constitutional: Positive for chills  Negative for fever  HENT: Negative for ear pain and sore throat  Eyes: Negative for pain and visual disturbance  Respiratory: Negative for cough and shortness of breath  Cardiovascular: Negative for chest pain and palpitations  Gastrointestinal: Positive for abdominal pain (Diffuse abdominal cramping), diarrhea, nausea and vomiting  Negative for abdominal distention, blood in stool, constipation and rectal pain  Genitourinary: Negative for difficulty urinating, dysuria and hematuria  Decreased urination   Musculoskeletal: Negative for arthralgias and back pain  Skin: Negative for color change and rash  Neurological: Positive for dizziness and headaches  Negative for seizures and syncope  All other systems reviewed and are negative  Physical Exam  ED Triage Vitals [09/16/22 1150]   Temperature Pulse Respirations Blood Pressure SpO2   98 5 °F (36 9 °C) (!) 134 18 140/81 97 %      Temp Source Heart Rate Source Patient Position - Orthostatic VS BP Location FiO2 (%)   Oral Monitor Lying Right arm --      Pain Score       7             Orthostatic Vital Signs  Vitals:    09/16/22 1630 09/16/22 1700 09/16/22 1730 09/16/22 1845   BP: 151/67 135/65 141/65 133/66   Pulse: (!) 107 105 (!) 112 101   Patient Position - Orthostatic VS: Lying Lying Lying Lying       Physical Exam  Vitals and nursing note reviewed  Constitutional:       General: She is not in acute distress  Appearance: She is well-developed  She is obese  She is not ill-appearing  HENT:      Head: Normocephalic and atraumatic  Eyes:      Conjunctiva/sclera: Conjunctivae normal    Cardiovascular:      Rate and Rhythm: Regular rhythm  Tachycardia present  Heart sounds: Normal heart sounds  No murmur heard  Pulmonary:      Effort: Pulmonary effort is normal  No respiratory distress  Breath sounds: Normal breath sounds  No wheezing  Abdominal:      General: Bowel sounds are normal  There is no distension  Palpations: Abdomen is soft  Tenderness: There is abdominal tenderness (Right and left lower quadrant)  There is no right CVA tenderness, left CVA tenderness or guarding  Musculoskeletal:      Cervical back: Neck supple  Skin:     General: Skin is warm and dry  Capillary Refill: Capillary refill takes 2 to 3 seconds  Neurological:      General: No focal deficit present  Mental Status: She is alert and oriented to person, place, and time  Mental status is at baseline  ED Medications  Medications   sodium chloride 0 9 % infusion (125 mL/hr Intravenous New Bag 9/16/22 1838)   nicotine (NICODERM CQ) 21 mg/24 hr TD 24 hr patch 21 mg (has no administration in time range)   ondansetron (ZOFRAN) injection 4 mg (4 mg Intravenous Given 9/16/22 1305)   sodium chloride 0 9 % bolus 1,000 mL (0 mL Intravenous Stopped 9/16/22 1404)   iohexol (OMNIPAQUE) 350 MG/ML injection (SINGLE-DOSE) 100 mL (100 mL Intravenous Given 9/16/22 1410)   acetaminophen (TYLENOL) tablet 650 mg (650 mg Oral Given 9/16/22 1521)   sodium chloride 0 9 % bolus 1,000 mL (0 mL Intravenous Stopped 9/16/22 1625)       Diagnostic Studies  Results Reviewed     Procedure Component Value Units Date/Time    HS Troponin I 4hr [[id number]] Collected: 09/16/22 1840    Lab Status:  In process Specimen: Blood from Arm, Right Updated: 09/16/22 1845    Stool Enteric Bacterial Panel by PCR [[id number]]     Lab Status: No result Specimen: Stool     Clostridium difficile toxin by PCR with EIA [[id number]]     Lab Status: No result Specimen: Stool     Urine Microscopic [[id number]]  (Normal) Collected: 09/16/22 1646    Lab Status: Final result Specimen: Urine, Clean Catch Updated: 09/16/22 1744     RBC, UA 1-2 /hpf      WBC, UA 1-2 /hpf      Epithelial Cells Occasional /hpf      Bacteria, UA None Seen /hpf     UA w Reflex to Microscopic w Reflex to Culture [[id number]]  (Abnormal) Collected: 09/16/22 1646    Lab Status: Final result Specimen: Urine, Clean Catch Updated: 09/16/22 1733     Color, UA Colorless     Clarity, UA Clear     Specific Gravity, UA >=1 050     pH, UA 5 0     Leukocytes, UA Negative     Nitrite, UA Negative     Protein, UA Trace mg/dl      Glucose, UA Negative mg/dl      Ketones, UA Negative mg/dl      Urobilinogen, UA <2 0 mg/dl      Bilirubin, UA Negative     Occult Blood, UA Moderate    HS Troponin I 2hr [[id number]]  (Normal) Collected: 09/16/22 1526    Lab Status: Final result Specimen: Blood from Arm, Right Updated: 09/16/22 1604     hs TnI 2hr 3 ng/L      Delta 2hr hsTnI 1 ng/L     FLU/RSV/COVID - if FLU/RSV clinically relevant [[id number]]  (Normal) Collected: 09/16/22 1302    Lab Status: Final result Specimen: Nares from Nose Updated: 09/16/22 1405     SARS-CoV-2 Negative     INFLUENZA A PCR Negative     INFLUENZA B PCR Negative     RSV PCR Negative    Narrative:      FOR PEDIATRIC PATIENTS - copy/paste COVID Guidelines URL to browser: [email or web address]    SARS-CoV-2 assay is a Nucleic Acid Amplification assay intended for the  qualitative detection of nucleic acid from SARS-CoV-2 in nasopharyngeal  swabs  Results are for the presumptive identification of SARS-CoV-2 RNA      Positive results are indicative of infection with SARS-CoV-2, the virus  causing COVID-19, but do not rule out bacterial infection or co-infection  with other viruses  Laboratories within the [place] and its  territories are required to report all positive results to the appropriate  public health authorities  Negative results do not preclude SARS-CoV-2  infection and should not be used as the sole basis for treatment or other  patient management decisions  Negative results must be combined with  clinical observations, patient history, and epidemiological information  This test has not been FDA cleared or approved  This test has been authorized by FDA under an Emergency Use Authorization  (EUA)  This test is only authorized for the duration of time the  declaration that circumstances exist justifying the authorization of the  emergency use of an in vitro diagnostic tests for detection of SARS-CoV-2  virus and/or diagnosis of COVID-19 infection under section 564(b)(1) of  the Act, 21 U  S C  391AUV-1(X)(9), unless the authorization is terminated  or revoked sooner  The test has been validated but independent review by FDA  and CLIA is pending  Test performed using [employer] Gene[employer]pert: This RT-PCR assay targets N2,  a region unique to SARS-CoV-2  A conserved region in the E-gene was chosen  for pan-Sarbecovirus detection which includes SARS-CoV-2  CBC and differential [[id number]]  (Abnormal) Collected: 09/16/22 1302    Lab Status: Final result Specimen: Blood from Arm, Right Updated: 09/16/22 1350     WBC 17 80 Thousand/uL      RBC 5 52 Million/uL      Hemoglobin 16 5 g/dL      Hematocrit 50 4 %      MCV 91 fL      MCH 29 9 pg      MCHC 32 7 g/dL      RDW 13 1 %      MPV 10 0 fL      Platelets 838 Thousands/uL     Narrative: This is an appended report  These results have been appended to a previously verified report      Manual Differential(PHLEBS Do Not Order) [[id number]]  (Abnormal) Collected: 09/16/22 1302    Lab Status: Final result Specimen: Blood from Arm, Right Updated: 09/16/22 1350     Segmented % 95 % Lymphocytes % 2 %      Monocytes % 2 %      Eosinophils, % 1 %      Basophils % 0 %      Absolute Neutrophils 16 91 Thousand/uL      Lymphocytes Absolute 0 36 Thousand/uL      Monocytes Absolute 0 36 Thousand/uL      Eosinophils Absolute 0 18 Thousand/uL      Basophils Absolute 0 00 Thousand/uL      Total Counted --     RBC Morphology Normal     Platelet Estimate Adequate    HS Troponin 0hr (reflex protocol) [[id number]]  (Normal) Collected: 09/16/22 1308    Lab Status: Final result Specimen: Blood from Arm, Right Updated: 09/16/22 1343     hs TnI 0hr 2 ng/L     Comprehensive metabolic panel [[id number]]  (Abnormal) Collected: 09/16/22 1302    Lab Status: Final result Specimen: Blood from Arm, Right Updated: 09/16/22 1340     Sodium 138 mmol/L      Potassium 4 4 mmol/L      Chloride 107 mmol/L      CO2 22 mmol/L      ANION GAP 9 mmol/L      BUN 15 mg/dL      Creatinine 0 81 mg/dL      Glucose 109 mg/dL      Calcium 10 2 mg/dL      AST 21 U/L      ALT 28 U/L      Alkaline Phosphatase 109 U/L      Total Protein 7 8 g/dL      Albumin 4 8 g/dL      Total Bilirubin 0 53 mg/dL      eGFR 76 ml/min/1 73sq m     Narrative:      [place] guidelines for Chronic Kidney Disease (CKD):     Stage 1 with normal or high GFR (GFR > 90 mL/min/1 73 square meters)    Stage 2 Mild CKD (GFR = 60-89 mL/min/1 73 square meters)    Stage 3A Moderate CKD (GFR = 45-59 mL/min/1 73 square meters)    Stage 3B Moderate CKD (GFR = 30-44 mL/min/1 73 square meters)    Stage 4 Severe CKD (GFR = 15-29 mL/min/1 73 square meters)    Stage 5 End Stage CKD (GFR <15 mL/min/1 73 square meters)  Note: GFR calculation is accurate only with a steady state creatinine                 CT abdomen pelvis with contrast   Final Result by Ceci Salinas MD (09/16 2039)   1  There is no acute abdominal/pelvic inflammatory process  2  There is no evidence of bowel obstruction, free intraperitoneal air, or ascites    The appendix appears within normal limits  3  Stable small hypodense lesion at the junction of the right and left hepatic lobes, probable cyst    4   Small calcified right fundal fibroid  Workstation performed: [id number]               Procedures  ECG 12 Lead Documentation Only    Date/Time: 9/16/2022 1:17 PM  Performed by: Isabel Ward MD  Authorized by: Isabel Ward MD     ECG reviewed by me, the ED Provider: yes    Patient location:  ED  Previous ECG:     Previous ECG:  Compared to current    Similarity:  No change    Comparison to cardiac monitor: No    Interpretation:     Interpretation: abnormal    Rate:     ECG rate:  129    ECG rate assessment: tachycardic    Rhythm:     Rhythm: sinus rhythm    Ectopy:     Ectopy: none    QRS:     QRS axis:  Normal  Conduction:     Conduction: normal    ST segments:     ST segments:  Non-specific    Elevation:  V4, II, V5 and V6  T waves:     T waves: normal            ED Course                             SBIRT 22yo+    Flowsheet Row Most Recent Value   SBIRT (25 yo +)    In order to provide better care to our patients, we are screening all of our patients for alcohol and drug use  Would it be okay to ask you these screening questions? Yes Filed at: 09/16/2022 1258   Initial Alcohol Screen: US AUDIT-C     1  How often do you have a drink containing alcohol? 0 Filed at: 09/16/2022 1258   2  How many drinks containing alcohol do you have on a typical day you are drinking? 0 Filed at: 09/16/2022 1258   3b  FEMALE Any Age, or MALE 65+: How often do you have 4 or more drinks on one occassion? 0 Filed at: 09/16/2022 1258   Audit-C Score 0 Filed at: 09/16/2022 1258   MONICA: How many times in the past year have you    Used an illegal drug or used a prescription medication for non-medical reasons?  Never Filed at: 09/16/2022 1258                MDM  Number of Diagnoses or Management Options  Dehydration  Nausea vomiting and diarrhea  Sinus tachycardia  Diagnosis management comments: DDx including but not limited to: food poisoning, viral illness, colitis, enteritis, metabolic abnormality, IBS, pancreatitis, hepatitis, mesenteric adenitis, mesenteric ischemia, less likely  toxic megacolon, cholecystitis, biliary colic, UTI, renal colic  Amount and/or Complexity of Data Reviewed  Decide to obtain previous medical records or to obtain history from someone other than the patient: yes    Risk of Complications, Morbidity, and/or Mortality  General comments: Esperanza Healy is a 72-year-old female presenting with multiple episodes of diarrhea and 1 episode of vomiting starting this morning  Patient on admission presented with tachycardia 120- 140 and slight hypertension  Patient reported nausea but denied acute abdominal pain and vomiting  Patient received 2 L IV bolus normal patient however remained tachycardic 120s after hydration  She was started on continues 125 mL an hour of normal saline  The CBC showed WBC, RBC and hematocrit elevated likely in the setting of dehydration  Not excluded infectious origin  Urinalysis showed moderate RBCs  CT abdomen and pelvis with contrast did not show any acute inflammatory changes, no bowel obstruction present  Patient was eager to go home but due to tachycardia she was made aware that she is not stable for discharge  Patient agreed to stay overnight for further monitoring  Stool enteric bacteria and C diff were pending to be collected  Patient denied more stools since admission            Disposition  Final diagnoses:   Nausea vomiting and diarrhea   Sinus tachycardia   Dehydration     Time reflects when diagnosis was documented in both MDM as applicable and the Disposition within this note     Time User Action Codes Description Comment    9/16/2022  6:49 PM Nahed Stratton Add [R11 2,  R19 7] Nausea vomiting and diarrhea     9/16/2022  6:49 PM Gladis Vazquez Add [R00 0] Sinus tachycardia     9/16/2022  6:49 PM Gladis Vazquez Add [E86 0] Dehydration       ED Disposition     ED Disposition   Admit    Condition   Stable    Date/Time   Fri Sep 16, 2022  6:50 PM    Comment   Case was discussed with Dr Jany Garcia and the patient's admission status was agreed to be Admission Status: observation status to the service of Dr Jany Garcia   Follow-up Information    None         Patient's Medications   Discharge Prescriptions    No medications on file     No discharge procedures on file  PDMP Review     None           ED Provider  Attending physically available and evaluated Romelia Carrillo  AUGUSTO managed the patient along with the ED Attending      Electronically Signed by         Avram Ormond, MD  09/16/22 1239

## 2022-09-16 NOTE — ED ATTENDING ATTESTATION
9/16/2022  ICass MD, saw and evaluated the patient  I have discussed the patient with the resident/non-physician practitioner and agree with the resident's/non-physician practitioner's findings, Plan of Care, and MDM as documented in the resident's/non-physician practitioner's note, except where noted  All available labs and Radiology studies were reviewed  I was present for key portions of any procedure(s) performed by the resident/non-physician practitioner and I was immediately available to provide assistance  At this point I agree with the current assessment done in the Emergency Department  I have conducted an independent evaluation of this patient a history and physical is as follows: Diarrhea, vomiting and dizziness today  Watery stool, more than 10  One episode of vomiting  Some abdominal cramping, diffuse  Chills but no fever  No cough, no sore throat  No chest pain or pressure  No sob  Noted heart rate to be 120 at home  Symptoms moderate in severity       ED Course         Critical Care Time  Procedures

## 2022-09-17 VITALS
RESPIRATION RATE: 18 BRPM | OXYGEN SATURATION: 97 % | HEART RATE: 99 BPM | HEIGHT: 62 IN | BODY MASS INDEX: 30.36 KG/M2 | SYSTOLIC BLOOD PRESSURE: 137 MMHG | DIASTOLIC BLOOD PRESSURE: 66 MMHG | WEIGHT: 165 LBS | TEMPERATURE: 98 F

## 2022-09-17 PROBLEM — R65.10 SIRS (SYSTEMIC INFLAMMATORY RESPONSE SYNDROME) (HCC): Status: ACTIVE | Noted: 2022-09-16

## 2022-09-17 LAB
ALBUMIN SERPL BCP-MCNC: 3.3 G/DL (ref 3.5–5)
ALP SERPL-CCNC: 73 U/L (ref 34–104)
ALT SERPL W P-5'-P-CCNC: 19 U/L (ref 7–52)
ANION GAP SERPL CALCULATED.3IONS-SCNC: 4 MMOL/L (ref 4–13)
AST SERPL W P-5'-P-CCNC: 17 U/L (ref 13–39)
BILIRUB SERPL-MCNC: 0.5 MG/DL (ref 0.2–1)
BUN SERPL-MCNC: 9 MG/DL (ref 5–25)
C DIFF TOX GENS STL QL NAA+PROBE: NEGATIVE
CALCIUM ALBUM COR SERPL-MCNC: 8.6 MG/DL (ref 8.3–10.1)
CALCIUM SERPL-MCNC: 8 MG/DL (ref 8.4–10.2)
CHLORIDE SERPL-SCNC: 115 MMOL/L (ref 96–108)
CO2 SERPL-SCNC: 20 MMOL/L (ref 21–32)
CREAT SERPL-MCNC: 0.67 MG/DL (ref 0.6–1.3)
GFR SERPL CREATININE-BSD FRML MDRD: 92 ML/MIN/1.73SQ M
GLUCOSE SERPL-MCNC: 108 MG/DL (ref 65–140)
POTASSIUM SERPL-SCNC: 3.8 MMOL/L (ref 3.5–5.3)
PROT SERPL-MCNC: 5.3 G/DL (ref 6.4–8.4)
SODIUM SERPL-SCNC: 139 MMOL/L (ref 135–147)

## 2022-09-17 PROCEDURE — 87493 C DIFF AMPLIFIED PROBE: CPT

## 2022-09-17 PROCEDURE — 80053 COMPREHEN METABOLIC PANEL: CPT | Performed by: PHYSICIAN ASSISTANT

## 2022-09-17 RX ORDER — NICOTINE 21 MG/24HR
1 PATCH, TRANSDERMAL 24 HOURS TRANSDERMAL DAILY
Qty: 28 PATCH | Refills: 0 | Status: SHIPPED | OUTPATIENT
Start: 2022-09-17

## 2022-09-17 RX ADMIN — ACETAMINOPHEN 650 MG: 325 TABLET, FILM COATED ORAL at 07:42

## 2022-09-17 RX ADMIN — ONDANSETRON 4 MG: 2 INJECTION INTRAMUSCULAR; INTRAVENOUS at 02:15

## 2022-09-17 RX ADMIN — SODIUM CHLORIDE 125 ML/HR: 0.9 INJECTION, SOLUTION INTRAVENOUS at 07:43

## 2022-09-17 NOTE — ASSESSMENT & PLAN NOTE
· POA a/e/b tachycardia and leukocytosis 17 8k in the setting of suspected gastroenteritis   · Tachycardia likely 2/2 dehydration   · Leukocytosis possibly reactive   · Will check lactic acid and blood cultures x2   · CT A/P without acute findings   · Afebrile and hemodynamically stable

## 2022-09-17 NOTE — ASSESSMENT & PLAN NOTE
Patient presented with NBNB vomiting x1 and multiple episode of watery diarrhea with associated dizziness that started yesterday at [address] [place] [address] Street lower abdominal pain/cramping which has resolved  Reports eating brisket/potato at a diner the day prior       · CT abdomen/pelvis without acute findings   · Suspect likely viral gastroenteritis   · C diff pending  · Received IVF   · Resolved nausea vomiting and diarrhea

## 2022-09-17 NOTE — ASSESSMENT & PLAN NOTE
Patient presents with NBNB vomiting x1 and multiple episode of watery diarrhea with associated dizziness that started this morning at 6am  Endorsed lower abdominal pain/cramping which has resolved  Reports eating brisket/potato at a diner the day prior     · CT abdomen/pelvis without acute findings   · Suspect likely viral gastroenteritis   · Check stool enteric panel and C diff   · IVF

## 2022-09-17 NOTE — H&P
[address] [facility] [birth year], 74 y o  female MRN: [id number]  Unit/Bed#: S -01 Encounter: [id number]  Primary Care Provider: Asia Teran MD   Date and time admitted to hospital: 9/16/2022 11:37 AM    * Diarrhea  Assessment & Plan  Patient presents with NBNB vomiting x1 and multiple episode of watery diarrhea with associated dizziness that started this morning at 6am  Reports eating brisket/potato at a diner the day prior  · CT abdomen/pelvis without acute findings   · Suspect likely viral gastroenteritis   · Check stool enteric panel and C diff   · IVF     Nausea & vomiting  Assessment & Plan  · IVF   · PRN Zofran   · Regular diet as per patient request     Sepsis ([facility] [address] )  Assessment & Plan  · POA a/e/b tachycardia and leukocytosis 17 8k in the setting of suspected gastroenteritis   · Tachycardia likely 2/2 dehydration   · Leukocytosis possibly reactive   · Will check lactic acid and blood cultures x2   · CT A/P without acute findings   · Afebrile and hemodynamically stable     Hyperlipidemia  Assessment & Plan  · Continue Lipitor and Zetia     CAD (coronary artery disease)  Assessment & Plan  · Denies chest pain   · Continue aspirin and statin     Smoker  Assessment & Plan  · Nicotine patch   · Encourage cessation       VTE Pharmacologic Prophylaxis: VTE Score: 4 Moderate Risk (Score 3-4) - Pharmacological DVT Prophylaxis Ordered: enoxaparin (Lovenox)  Code Status: Level 1 - Full Code   Discussion with family: Patient declined call to   Anticipated Length of Stay: Patient will be admitted on an observation basis with an anticipated length of stay of less than 2 midnights secondary to sepsis/dehydration 2/2 gastroenteritis   Total Time for Visit, including Counseling / Coordination of Care: 45 minutes Greater than 50% of this total time spent on direct patient counseling and coordination of care      Chief Complaint: nausea/vomiting/diarrhea x1 day     History of Present Illness:  Benigno Contreras is a 74 y o  female with a PMH of CAD, HLD who presents with NBNB vomiting x1 and multiple episodes of watery diarrhea with associated dizziness that started this morning at 6am  Also stated she had lower abdominal pain/cramping which has since resolved  Patient states she also no longer feels dizzy  She denies any recent travel or sick contacts  She recalls eating brisket and potato at a diner the day prior which she believes gave her food poisoning  Subjective fever at home  She states she was worried she was having a heart attack as she had similar symptoms in the past requiring cardiac cath  She denies any chest pain at this time  Review of Systems:  Review of Systems   Constitutional: Positive for appetite change, chills and fever  HENT: Negative for sore throat  Eyes: Negative for visual disturbance  Respiratory: Negative for cough, shortness of breath and wheezing  Cardiovascular: Negative for chest pain and leg swelling  Gastrointestinal: Positive for abdominal pain, diarrhea, nausea and vomiting  Negative for blood in stool and constipation  Genitourinary: Negative for dysuria  Musculoskeletal: Negative for arthralgias  Skin: Negative for rash  Neurological: Positive for dizziness and headaches  Negative for tremors, seizures, speech difficulty, weakness, light-headedness and numbness  Psychiatric/Behavioral: The patient is nervous/anxious           Past Medical and Surgical History:   Past Medical History:   Diagnosis Date    Acid reflux     Anxiety     Arthritis     Cancer ([facility] [address] )     skin on chin    Cardiac disease     Chronic kidney disease     (R) kidney smaller than (L),  kidney stones    Chronic UTI (urinary tract infection)     Colitis     microscopic    COPD (chronic obstructive pulmonary disease) (HCC)     mild, recent dx    Depression     Epidermal cyst of face     last assessed 10-    Heart attack ([address] ) 09/20/2018    [place] (hard of hearing)     deaf (L) ear, decreased hearing (R) ear    Hyperlipemia     diet controlled    Hyperlipidemia     IBS (irritable bowel syndrome)     Malignant neoplasm of skin     Meniere disease     Myocardial infarction ([facility] [address] ) 09/2018    Numbness and tingling of left side of face     Uterine leiomyoma        Past Surgical History:   Procedure Laterality Date    CARDIAC CATHETERIZATION      9/21/18 [facility] - severe CAD of the LAD and diagonal artery    COLONOSCOPY N/A 11/4/2016    Procedure: COLONOSCOPY;  Surgeon: Yogi Santos MD;  Location: [facility]; Service:     ERCP      ESOPHAGOGASTRODUODENOSCOPY N/A 11/4/2016    Procedure: ESOPHAGOGASTRODUODENOSCOPY (EGD); Surgeon: Yogi Santos MD;  Location: [facility]; Service:     EYE SURGERY      MO XCAPSL CTRC RMVL INSJ IO LENS PROSTH W/O ECP Left 3/7/2016    Procedure: EXTRACTION EXTRACAPSULAR CATARACT PHACO INTRAOCULAR LENS (IOL); Surgeon: Maris Guillory MD;  Location: [facility] MAIN OR;  Service: Ophthalmology    SKIN CANCER EXCISION      excision skin cancer on chin       Meds/Allergies:  Prior to Admission medications    Medication Sig Start Date End Date Taking? Authorizing Provider   aspirin 81 mg chewable tablet Chew 81 mg daily   Yes Historical Provider, MD   atorvastatin (LIPITOR) 20 mg tablet Take 30 mg by mouth daily   Yes Historical Provider, MD   ezetimibe (ZETIA) 10 mg tablet Take 10 mg by mouth daily   Yes Historical Provider, MD   atorvastatin (LIPITOR) 40 mg tablet Take 40 mg by mouth daily    Patient not taking: Reported on 9/16/2022 9/28/21 9/16/22  Historical Provider, MD     I have reviewed home medications with patient personally  Allergies:    Allergies   Allergen Reactions    Erythromycin Hives and Itching    Nizatidine Other (See Comments)     Severe chest pain  Severe chest pain    Penicillins Hives and Itching     Rash, hives    Wellbutrin [Bupropion] Other (See Comments) Elevated  / 110    Ciprofloxacin Hcl Hives    Fish-Derived Products - Food Allergy GI Intolerance     Tuna fish & all shell fish    Other Hives     allegic to all cillans and all mycins    Ampicillin     Benadryl [Diphenhydramine Hcl] Other (See Comments)     unknown    Ciprofloxacin      Severe hives, sob?  Clindamycin     Diphenhydramine      Facial swelling ?     Doxycycline     Lovastatin      Muscle aches  Muscle aches    Nickel Other (See Comments)     Throat swelling/itching    Shellfish-Derived Products - Food Allergy GI Intolerance     Pt unsure       Social History:  Marital Status: Single   Patient Pre-hospital Living Situation: Home  Patient Pre-hospital Level of Mobility: walks  Patient Pre-hospital Diet Restrictions: none  Substance Use History:   Social History     Substance and Sexual Activity   Alcohol Use No     Social History     Tobacco Use   Smoking Status Current Every Day Smoker    Packs/day: 1 00    Years: 49 00    Pack years: 49 00    Types: Cigarettes   Smokeless Tobacco Never Used   Tobacco Comment    45+ years per allscripts     Social History     Substance and Sexual Activity   Drug Use No       Family History:  Family History   Problem Relation Age of Onset    Heart disease Mother         cardiac disorder    Lung cancer Mother     COPD Mother     Colonic polyp Sister     Thyroid cancer Sister     Osteoporosis Family     Colon cancer Family     Heart disease Family         cardiac disorder    Heart disease Family         cardiac disorder    Stroke Family     Arthritis Family     Colon cancer Family     Lung cancer Family     Cancer Family         anterior wall of urinanry bladder    Breast cancer Family     Other Family         brain tumor    Breast cancer Cousin 21    Brain cancer Father     Skin cancer Brother     Skin cancer Brother     Diabetes Son     Neuropathy Son     No Known Problems Son        Physical Exam:     Vitals:   Blood Pressure: 145/70 (09/16/22 2059)  Pulse: 93 (09/16/22 2059)  Temperature: 99 2 °F (37 3 °C) (09/16/22 2059)  Temp Source: Oral (09/16/22 2059)  Respirations: 18 (09/16/22 2059)  Height: 5' 2" (157 5 cm) (09/16/22 1150)  Weight - Scale: 74 8 kg (165 lb) (09/16/22 1150)  SpO2: 96 % (09/16/22 2059)    Physical Exam  Constitutional:       General: She is not in acute distress  Appearance: She is not ill-appearing or toxic-appearing  HENT:      Mouth/Throat:      Mouth: Mucous membranes are moist    Eyes:      General: No scleral icterus  Cardiovascular:      Rate and Rhythm: Normal rate and regular rhythm  Heart sounds: Normal heart sounds  Pulmonary:      Breath sounds: Normal breath sounds  Abdominal:      General: Bowel sounds are normal       Palpations: Abdomen is soft  Tenderness: There is no abdominal tenderness  Musculoskeletal:      Right lower leg: No edema  Left lower leg: No edema  Skin:     General: Skin is warm  Neurological:      Mental Status: She is alert and oriented to person, place, and time  Psychiatric:         Mood and Affect: Mood normal            Additional Data:     Lab Results:  Results from last 7 days   Lab Units 09/16/22  1302   WBC Thousand/uL 17 80*   HEMOGLOBIN g/dL 16 5*   HEMATOCRIT % 50 4*   PLATELETS Thousands/uL 199   LYMPHO PCT % 2*   MONO PCT % 2*   EOS PCT % 1     Results from last 7 days   Lab Units 09/16/22  1302   SODIUM mmol/L 138   POTASSIUM mmol/L 4 4   CHLORIDE mmol/L 107   CO2 mmol/L 22   BUN mg/dL 15   CREATININE mg/dL 0 81   ANION GAP mmol/L 9   CALCIUM mg/dL 10 2   ALBUMIN g/dL 4 8   TOTAL BILIRUBIN mg/dL 0 53   ALK PHOS U/L 109*   ALT U/L 28   AST U/L 21   GLUCOSE RANDOM mg/dL 109                       Imaging: Reviewed radiology reports from this admission including: abdominal/pelvic CT  CT abdomen pelvis with contrast   Final Result by Olesya Riojas MD (09/16 1434)   1  There is no acute abdominal/pelvic inflammatory process  2  There is no evidence of bowel obstruction, free intraperitoneal air, or ascites  The appendix appears within normal limits  3  Stable small hypodense lesion at the junction of the right and left hepatic lobes, probable cyst    4   Small calcified right fundal fibroid  Workstation performed: [id number]             EKG and Other Studies Reviewed on Admission:   · EKG: sinus tachycardia   ** Please Note: This note has been constructed using a voice recognition system   **

## 2022-09-17 NOTE — PROGRESS NOTES
RN attended to Pt's call bell which was going off  Pt yelled at RN stating that she was tired of her IV pump going off (which said occluded from Pt bending her arm)  Pt demanded RN unhook Pt from continuous fluids and that she felt fine now  Pt stated she was going home and wanted her discharge paperwork  RN notified [facility] attending  Pt then demanded RN take out pt's IV because she was going home  RN educated Pt on importance of maintaining IV access while in the hospital but Pt stated she didn't care  RN removed Pt's IV  [facility] attending notified RN that discharge paperwork was being completed by [facility] resident  RN notified Pt of this  RN went to check on Pt while paperwork was being completed and Pt just left before paperwork could be completed or gone over with Pt

## 2022-09-17 NOTE — ASSESSMENT & PLAN NOTE
· POA a/e/b tachycardia and leukocytosis 17 8k in the setting of suspected gastroenteritis   · Tachycardia likely 2/2 dehydration   · Leukocytosis possibly reactive   · Lactic acid normal  · Blood cultures pending  · CT A/P without acute findings   · Afebrile and hemodynamically stable

## 2022-09-17 NOTE — DISCHARGE SUMMARY
[facility]  Discharge- Raman Thompson [birth year], 74 y o  female MRN: [id number]  Unit/Bed#: S -01 Encounter: [id number]  Primary Care Provider: Harish Dodge MD   Date and time admitted to hospital: 9/16/2022 11:37 AM    * Diarrhea  Assessment & Plan  Patient presented with NBNB vomiting x1 and multiple episode of watery diarrhea with associated dizziness that started yesterday at [address] [place] [address] Street lower abdominal pain/cramping which has resolved  Reports eating brisket/potato at a diner the day prior       · CT abdomen/pelvis without acute findings   · Suspect likely viral gastroenteritis   · C diff pending  · Received IVF   · Resolved nausea vomiting and diarrhea    Hyperlipidemia  Assessment & Plan  · Continue Lipitor and Zetia     CAD (coronary artery disease)  Assessment & Plan  · Denies chest pain   · Continue aspirin and statin     SIRS (systemic inflammatory response syndrome) (HCC)  Assessment & Plan  · POA a/e/b tachycardia and leukocytosis 17 8k in the setting of suspected gastroenteritis   · Tachycardia likely 2/2 dehydration   · Leukocytosis possibly reactive   · Lactic acid normal  · Blood cultures pending  · CT A/P without acute findings   · Afebrile and hemodynamically stable     Nausea & vomiting  Assessment & Plan  · Receive IVF   · Received PRN Zofran   · Regular diet   · Resolved    Smoker  Assessment & Plan  · Nicotine patch   · Encourage cessation         Medical Problems             Resolved Problems  Date Reviewed: 9/17/2022   None               Discharging Resident: Janay Mcnair MD  Discharging Attending: Markie Bragg MD  PCP: Harish Dodge MD  Admission Date:   Admission Orders (From admission, onward)     Ordered        09/16/22 1851  Place in Observation  Once                      Discharge Date: 09/17/22    Consultations During Hospital Stay:  · None    Procedures Performed:   · None    Significant Findings / Test Results:   CT abdomen pelvis with contrast   Final Result by Vida Willingham MD (09/16 5210)   1  There is no acute abdominal/pelvic inflammatory process  2  There is no evidence of bowel obstruction, free intraperitoneal air, or ascites  The appendix appears within normal limits  3  Stable small hypodense lesion at the junction of the right and left hepatic lobes, probable cyst    4   Small calcified right fundal fibroid  Workstation performed: [id number]             Incidental Findings:   · None     Test Results Pending at Discharge (will require follow up):  · C diff panel     Outpatient Tests Requested:  · None    Complications:  None    Reason for Admission:  Diarrhea nausea and vomiting    Hospital Course:   Flip Pizarro is a 74 y o  female patient who originally presented to the hospital on 9/16/2022 due to NBNB vomiting x1 and multiple episodes of watery diarrhea with associated dizziness that started yesterday morning at 6am  Also stated she had lower abdominal pain/cramping which has since resolved  Patient stated she also no longer felt dizzy  She denies any recent travel or sick contacts  She recalls eating brisket and potato at a diner the day prior which she believes gave her food poisoning  Subjective fever at home  She states she was worried she was having a heart attack as she had similar symptoms in the past requiring cardiac cath  She denies any chest pain  Patient's symptoms improved, she does not have any nausea/vomiting or diarrhea anymore  She had an episode of chest pain at night but  It resolved and EKG was unremarkable as well  Patient is getting discharged home  She left before she was seen by attending or was provided with discharge papers and instructions  Please see above list of diagnoses and related plan for additional information  Condition at Discharge: good    Discharge Day Visit / Exam:   Subjective:  Patient feels better, denies nausea vomiting and diarrhea    Denies any chest pain   Vitals: Blood Pressure: 137/66 (09/17/22 0758)  Pulse: 99 (09/17/22 0758)  Temperature: 98 °F (36 7 °C) (09/17/22 0758)  Temp Source: Oral (09/17/22 0758)  Respirations: 18 (09/17/22 0758)  Height: 5' 2" (157 5 cm) (09/16/22 1150)  Weight - Scale: 74 8 kg (165 lb) (09/16/22 1150)  SpO2: 97 % (09/17/22 0758)  Exam:   Physical Exam  Vitals and nursing note reviewed  Constitutional:       General: She is not in acute distress  Appearance: She is well-developed  HENT:      Head: Normocephalic and atraumatic  Eyes:      Conjunctiva/sclera: Conjunctivae normal    Cardiovascular:      Rate and Rhythm: Normal rate and regular rhythm  Heart sounds: No murmur heard  Pulmonary:      Effort: Pulmonary effort is normal  No respiratory distress  Breath sounds: Normal breath sounds  Abdominal:      Palpations: Abdomen is soft  Tenderness: There is no abdominal tenderness  Musculoskeletal:      Cervical back: Neck supple  Skin:     General: Skin is warm and dry  Neurological:      Mental Status: She is alert  Discussion with Family: Patient declined call to   Discharge instructions/Information to patient and family:   See after visit summary for information provided to patient and family  Provisions for Follow-Up Care:  See after visit summary for information related to follow-up care and any pertinent home health orders  Disposition:   Home    Planned Readmission:  None    Discharge Medications:  See after visit summary for reconciled discharge medications provided to patient and/or family        **Please Note: This note may have been constructed using a voice recognition system**

## 2022-09-19 LAB
ATRIAL RATE: 105 BPM
ATRIAL RATE: 112 BPM
ATRIAL RATE: 129 BPM
P AXIS: 60 DEGREES
P AXIS: 65 DEGREES
P AXIS: 66 DEGREES
PR INTERVAL: 140 MS
PR INTERVAL: 146 MS
PR INTERVAL: 148 MS
QRS AXIS: 49 DEGREES
QRS AXIS: 52 DEGREES
QRS AXIS: 60 DEGREES
QRSD INTERVAL: 72 MS
QRSD INTERVAL: 74 MS
QRSD INTERVAL: 74 MS
QT INTERVAL: 294 MS
QT INTERVAL: 314 MS
QT INTERVAL: 316 MS
QTC INTERVAL: 415 MS
QTC INTERVAL: 430 MS
QTC INTERVAL: 431 MS
T WAVE AXIS: 33 DEGREES
T WAVE AXIS: 40 DEGREES
T WAVE AXIS: 60 DEGREES
VENTRICULAR RATE: 105 BPM
VENTRICULAR RATE: 112 BPM
VENTRICULAR RATE: 129 BPM

## 2022-09-19 PROCEDURE — 93010 ELECTROCARDIOGRAM REPORT: CPT | Performed by: INTERNAL MEDICINE

## 2022-09-22 LAB
BACTERIA BLD CULT: NORMAL
BACTERIA BLD CULT: NORMAL

## 2022-10-31 ENCOUNTER — TELEPHONE (OUTPATIENT)
Dept: PULMONOLOGY | Facility: MEDICAL CENTER | Age: 67
End: 2022-10-31

## 2022-10-31 NOTE — TELEPHONE ENCOUNTER
LM for patient to call office back to  schedule 1yr f/u, schedule first available  Appointment reminder mailed

## 2022-11-18 DIAGNOSIS — J44.9 CHRONIC OBSTRUCTIVE PULMONARY DISEASE, UNSPECIFIED COPD TYPE (HCC): Primary | ICD-10-CM

## 2023-01-18 ENCOUNTER — HOSPITAL ENCOUNTER (OUTPATIENT)
Dept: MAMMOGRAPHY | Facility: HOSPITAL | Age: 68
Discharge: HOME | End: 2023-01-18

## 2023-01-18 VITALS — WEIGHT: 165 LBS | HEIGHT: 62 IN | BODY MASS INDEX: 30.36 KG/M2

## 2023-01-18 DIAGNOSIS — Z12.31 ENCOUNTER FOR SCREENING MAMMOGRAM FOR MALIGNANT NEOPLASM OF BREAST: ICD-10-CM

## 2023-01-19 ENCOUNTER — RA CDI HCC (OUTPATIENT)
Dept: OTHER | Facility: HOSPITAL | Age: 68
End: 2023-01-19

## 2023-01-19 ENCOUNTER — OFFICE VISIT (OUTPATIENT)
Dept: DERMATOLOGY | Facility: CLINIC | Age: 68
End: 2023-01-19

## 2023-01-19 VITALS — BODY MASS INDEX: 30.36 KG/M2 | TEMPERATURE: 97 F | HEIGHT: 62 IN | WEIGHT: 165 LBS

## 2023-01-19 DIAGNOSIS — Z78.9 NORMAL SKIN APPEARANCE: ICD-10-CM

## 2023-01-19 DIAGNOSIS — Z85.828 HISTORY OF NONMELANOMA SKIN CANCER: Primary | ICD-10-CM

## 2023-01-19 RX ORDER — ATORVASTATIN CALCIUM 40 MG/1
40 TABLET, FILM COATED ORAL DAILY
COMMUNITY
Start: 2022-12-23

## 2023-01-19 NOTE — PROGRESS NOTES
[facility]  coding opportunities       Chart reviewed, no opportunity found:   [address]        Patients Insurance     Medicare Insurance: [employer] Advantage
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
## 2023-01-20 ENCOUNTER — OFFICE VISIT (OUTPATIENT)
Dept: FAMILY MEDICINE CLINIC | Facility: CLINIC | Age: 68
End: 2023-01-20

## 2023-01-20 VITALS
WEIGHT: 168 LBS | SYSTOLIC BLOOD PRESSURE: 130 MMHG | HEIGHT: 62 IN | BODY MASS INDEX: 30.91 KG/M2 | OXYGEN SATURATION: 95 % | DIASTOLIC BLOOD PRESSURE: 78 MMHG | HEART RATE: 90 BPM | TEMPERATURE: 98 F

## 2023-01-20 DIAGNOSIS — Z28.21 PNEUMOCOCCAL VACCINATION DECLINED BY PATIENT: ICD-10-CM

## 2023-01-20 DIAGNOSIS — I25.10 CORONARY ARTERY DISEASE INVOLVING NATIVE CORONARY ARTERY OF NATIVE HEART WITHOUT ANGINA PECTORIS: ICD-10-CM

## 2023-01-20 DIAGNOSIS — Z28.21 INFLUENZA VACCINATION DECLINED BY PATIENT: ICD-10-CM

## 2023-01-20 DIAGNOSIS — Z28.21 TETANUS, DIPHTHERIA, AND ACELLULAR PERTUSSIS (TDAP) VACCINATION DECLINED: ICD-10-CM

## 2023-01-20 DIAGNOSIS — Z78.0 ENCOUNTER FOR OSTEOPOROSIS SCREENING IN ASYMPTOMATIC POSTMENOPAUSAL PATIENT: ICD-10-CM

## 2023-01-20 DIAGNOSIS — I25.2 HISTORY OF MI (MYOCARDIAL INFARCTION): ICD-10-CM

## 2023-01-20 DIAGNOSIS — Z80.8 FAMILY HISTORY OF THYROID CANCER: ICD-10-CM

## 2023-01-20 DIAGNOSIS — F17.200 CURRENT SMOKER: ICD-10-CM

## 2023-01-20 DIAGNOSIS — Z28.21 COVID-19 VACCINE SERIES DECLINED: ICD-10-CM

## 2023-01-20 DIAGNOSIS — Z28.310 COVID-19 VACCINE SERIES DECLINED: ICD-10-CM

## 2023-01-20 DIAGNOSIS — Z76.89 ENCOUNTER TO ESTABLISH CARE: Primary | ICD-10-CM

## 2023-01-20 DIAGNOSIS — E78.5 HYPERLIPIDEMIA LDL GOAL <70: ICD-10-CM

## 2023-01-20 DIAGNOSIS — Z12.11 COLON CANCER SCREENING: ICD-10-CM

## 2023-01-20 DIAGNOSIS — Z13.29 SCREENING FOR THYROID DISORDER: ICD-10-CM

## 2023-01-20 DIAGNOSIS — Z13.820 ENCOUNTER FOR OSTEOPOROSIS SCREENING IN ASYMPTOMATIC POSTMENOPAUSAL PATIENT: ICD-10-CM

## 2023-01-20 DIAGNOSIS — J32.4 CHRONIC PANSINUSITIS: ICD-10-CM

## 2023-01-20 DIAGNOSIS — R91.8 LUNG NODULES: ICD-10-CM

## 2023-01-20 NOTE — PROGRESS NOTES
Assessment/Plan:   Diagnoses and all orders for this visit:    Encounter to establish care  - reviewed PMHx, PSHx, meds, allergies, FHx, Soc Hx   - declined COVID series  - declined Tdap   - declined annual Flu vaccine  - declined PVC20   - script given for routine labs  - UTD with screening Mammo (1/18/2023)   - Cervical Ca screening no longer indicated after age 70yo   - UTD with screening Cscope (2016 with Dr Sandy Navarro) - 10yr recall   - due for Osteoporosis screening - script given for DXA  - UTD with Optho and Dental   - RTO in 1month, with labs, for AWV - pt aware and agreeable     Lung nodules  - follows with Pulm  - currently smokes 1PPD/50yrs - not ready to quit  - has Nicoderm Patch but does not use   Current smoker    Coronary artery disease involving native coronary artery of native heart without angina pectoris  -     Comprehensive metabolic panel; Future  -     CBC and differential; Future  - follows with Cardio in [address] [place] [address] - no records  - s/p MI in 2018   History of MI (myocardial infarction)    Hyperlipidemia LDL goal <70  -     Lipid panel; Future  - per pt, has been prescribed Lipitor 40mg QHS but takes 30mg QHS     Encounter for osteoporosis screening in asymptomatic postmenopausal patient  -     DXA bone density spine hip and pelvis; Future  -     Vitamin D 25 hydroxy; Future    Screening for thyroid disorder  -     TSH, 3rd generation with Free T4 reflex    Family history of thyroid cancer  -     TSH, 3rd generation with Free T4 reflex    Colon cancer screening  - UTD with screening Cscope (2016 with Dr Sandy Navarro) - 10yr recall     Influenza vaccination declined by patient  Tetanus, diphtheria, and acellular pertussis (Tdap) vaccination declined  Pneumococcal vaccination declined by patient  COVID-19 vaccine series declined    Chronic pansinusitis  -     Ambulatory Referral to Otolaryngology;  Future  - for now, advised OTC Flonase, Ashley Pot and Mucinex   - of note, pt has an appt with her Allergist on 1/23/2023   - f/u PRN           Subjective:    Patient ID: Aleida Kang is a 74 y o  female  Aleida Kang is a 74 y o  female who presents to the office to establish care   1) Establish Care   - prior PCP: in [place]   - Specialists: Pulm, Sleep Med, GI, Cardio, Derm, Allergist    - PMHx: GERD, emphysema, MARY, CAD, MI in 2018, BPPV, Depression/Anxiety, HL, lung nodules, skin cancer  - allergies: see list   - Meds: see med rec   - PSHx: eye surgery, cardiac cath  - FHx: F (Brain Ca), M (HD, COPD, lung ca), S (thyroid cancer, colon polyps), Brother (Skin Ca), MGM (Colon Ca), Son (DM, Neuropathy, MS), Son2 (DM)   - Immunizations: declined COVID IMMs, declined Tdap, declined Flu, declined PVC in the office   - GYN Hx: does not have an Ob/Gyn   - Hm: UTD with screening Mammo (1/18/2023), UTD with screening Cscope (2016 with Dr Davon Reagan - 10yr recall)   - diet/exercise: does not exercise, balanced diet   - social: (+) current smoker - 1PPD/50yrs - not ready to quit   - sexual Hx: not active, STD screening not indicated    - last vision: wears glasses, [employer]   2) ? Sinus infection  - chronic for the the past year   - has an appt with Allergist on Monday (1/23/2023)   - felt nauseous yesterday  - occasional HA behind L-eye   - pt denies F/C/V/visual changes/congestion/sore throat/CP/palpitations/SOB/wheezing/abd pain/D/LE edema      The following portions of the patient's history were reviewed and updated as appropriate: allergies, current medications, past family history, past medical history, past social history, past surgical history and problem list     Review of Systems  as per HPI    Objective:  /78   Pulse 90   Temp 98 °F (36 7 °C)   Ht 5' 2" (1 575 m)   Wt 76 2 kg (168 lb)   SpO2 95%   BMI 30 73 kg/m²    Physical Exam  Vitals reviewed  Constitutional:       General: She is not in acute distress  Appearance: Normal appearance  She is not ill-appearing, toxic-appearing or diaphoretic  HENT:      Head: Normocephalic and atraumatic  Right Ear: Tympanic membrane, ear canal and external ear normal  There is no impacted cerumen  Left Ear: Tympanic membrane, ear canal and external ear normal  There is no impacted cerumen  Nose: Nose normal       Mouth/Throat:      Mouth: Mucous membranes are moist       Pharynx: Oropharynx is clear  No oropharyngeal exudate or posterior oropharyngeal erythema  Eyes:      General: No scleral icterus  Right eye: No discharge  Left eye: No discharge  Extraocular Movements: Extraocular movements intact  Conjunctiva/sclera: Conjunctivae normal    Cardiovascular:      Rate and Rhythm: Normal rate and regular rhythm  Heart sounds: Normal heart sounds  Pulmonary:      Effort: Pulmonary effort is normal       Breath sounds: Normal breath sounds  Abdominal:      Palpations: Abdomen is soft  Musculoskeletal:         General: Normal range of motion  Cervical back: Normal range of motion  Right lower leg: No edema  Left lower leg: No edema  Skin:     General: Skin is warm  Neurological:      General: No focal deficit present  Mental Status: She is alert and oriented to person, place, and time  Psychiatric:         Mood and Affect: Mood normal          Behavior: Behavior normal          BMI Counseling: Body mass index is 30 73 kg/m²  The BMI is above normal  Nutrition recommendations include 3-5 servings of fruits/vegetables daily  Exercise recommendations include exercising 3-5 times per week  Depression Screening Follow-up Plan: Patient's depression screening was positive with a PHQ-2 score of   Their PHQ-9 score was 0  Clinically patient does not have depression  No treatment is required

## 2023-01-24 ENCOUNTER — HOSPITAL ENCOUNTER (OUTPATIENT)
Dept: RADIOLOGY | Facility: IMAGING CENTER | Age: 68
Discharge: HOME | End: 2023-01-24

## 2023-01-24 DIAGNOSIS — Z78.0 ENCOUNTER FOR OSTEOPOROSIS SCREENING IN ASYMPTOMATIC POSTMENOPAUSAL PATIENT: ICD-10-CM

## 2023-01-24 DIAGNOSIS — Z13.820 ENCOUNTER FOR OSTEOPOROSIS SCREENING IN ASYMPTOMATIC POSTMENOPAUSAL PATIENT: ICD-10-CM

## 2023-01-25 ENCOUNTER — TELEPHONE (OUTPATIENT)
Dept: FAMILY MEDICINE CLINIC | Facility: CLINIC | Age: 68
End: 2023-01-25

## 2023-01-25 LAB
25(OH)D3+25(OH)D2 SERPL-MCNC: 31.7 NG/ML (ref 30–100)
ALBUMIN SERPL-MCNC: 4.5 G/DL (ref 3.8–4.8)
ALBUMIN/GLOB SERPL: 2.1 {RATIO} (ref 1.2–2.2)
ALP SERPL-CCNC: 109 IU/L (ref 44–121)
ALT SERPL-CCNC: 26 IU/L (ref 0–32)
AST SERPL-CCNC: 26 IU/L (ref 0–40)
BASOPHILS # BLD AUTO: 0.1 X10E3/UL (ref 0–0.2)
BASOPHILS NFR BLD AUTO: 1 %
BILIRUB SERPL-MCNC: 0.4 MG/DL (ref 0–1.2)
BUN SERPL-MCNC: 10 MG/DL (ref 8–27)
BUN/CREAT SERPL: 14 (ref 12–28)
CALCIUM SERPL-MCNC: 9.4 MG/DL (ref 8.7–10.3)
CHLORIDE SERPL-SCNC: 108 MMOL/L (ref 96–106)
CHOLEST SERPL-MCNC: 149 MG/DL (ref 100–199)
CO2 SERPL-SCNC: 22 MMOL/L (ref 20–29)
CREAT SERPL-MCNC: 0.74 MG/DL (ref 0.57–1)
EGFR: 90 ML/MIN/1.73
EOSINOPHIL # BLD AUTO: 0.2 X10E3/UL (ref 0–0.4)
EOSINOPHIL NFR BLD AUTO: 3 %
ERYTHROCYTE [DISTWIDTH] IN BLOOD BY AUTOMATED COUNT: 12.9 % (ref 11.7–15.4)
GLOBULIN SER-MCNC: 2.1 G/DL (ref 1.5–4.5)
GLUCOSE SERPL-MCNC: 101 MG/DL (ref 70–99)
HCT VFR BLD AUTO: 42.5 % (ref 34–46.6)
HDLC SERPL-MCNC: 49 MG/DL
HGB BLD-MCNC: 14.5 G/DL (ref 11.1–15.9)
IMM GRANULOCYTES # BLD: 0 X10E3/UL (ref 0–0.1)
IMM GRANULOCYTES NFR BLD: 1 %
LDLC SERPL CALC-MCNC: 78 MG/DL (ref 0–99)
LYMPHOCYTES # BLD AUTO: 1.6 X10E3/UL (ref 0.7–3.1)
LYMPHOCYTES NFR BLD AUTO: 19 %
MCH RBC QN AUTO: 29.8 PG (ref 26.6–33)
MCHC RBC AUTO-ENTMCNC: 34.1 G/DL (ref 31.5–35.7)
MCV RBC AUTO: 87 FL (ref 79–97)
MONOCYTES # BLD AUTO: 0.5 X10E3/UL (ref 0.1–0.9)
MONOCYTES NFR BLD AUTO: 6 %
NEUTROPHILS # BLD AUTO: 6 X10E3/UL (ref 1.4–7)
NEUTROPHILS NFR BLD AUTO: 70 %
PLATELET # BLD AUTO: 206 X10E3/UL (ref 150–450)
POTASSIUM SERPL-SCNC: 4.5 MMOL/L (ref 3.5–5.2)
PROT SERPL-MCNC: 6.6 G/DL (ref 6–8.5)
RBC # BLD AUTO: 4.86 X10E6/UL (ref 3.77–5.28)
SL AMB VLDL CHOLESTEROL CALC: 22 MG/DL (ref 5–40)
SODIUM SERPL-SCNC: 141 MMOL/L (ref 134–144)
TRIGL SERPL-MCNC: 127 MG/DL (ref 0–149)
TSH SERPL DL<=0.005 MIU/L-ACNC: 0.86 UIU/ML (ref 0.45–4.5)
WBC # BLD AUTO: 8.5 X10E3/UL (ref 3.4–10.8)

## 2023-01-25 NOTE — TELEPHONE ENCOUNTER
----- Message from Tatyana German DO sent at 1/25/2023  9:40 AM EST -----  Osteopenia - Vit D 2000IU QD and Ca++ 1200mg QD

## 2023-01-25 NOTE — TELEPHONE ENCOUNTER
----- Message from Julian Welsh DO sent at 1/25/2023  9:39 AM EST -----  Reviewed nml CMP, CBC, Lipids (goal LDL less than 70), TSH   - advised to take Lipitor 40mg QHS and will repeat labs in 6months

## 2023-02-17 ENCOUNTER — HOSPITAL ENCOUNTER (OUTPATIENT)
Dept: CT IMAGING | Facility: HOSPITAL | Age: 68
End: 2023-02-17
Attending: OTOLARYNGOLOGY

## 2023-02-17 DIAGNOSIS — J34.89 PAIN OF MAXILLARY SINUS: ICD-10-CM

## 2023-03-15 ENCOUNTER — OFFICE VISIT (OUTPATIENT)
Dept: URGENT CARE | Facility: CLINIC | Age: 68
End: 2023-03-15

## 2023-03-15 VITALS
HEART RATE: 115 BPM | OXYGEN SATURATION: 96 % | SYSTOLIC BLOOD PRESSURE: 176 MMHG | TEMPERATURE: 98.1 F | WEIGHT: 168 LBS | HEIGHT: 62 IN | RESPIRATION RATE: 28 BRPM | DIASTOLIC BLOOD PRESSURE: 82 MMHG | BODY MASS INDEX: 30.91 KG/M2

## 2023-03-15 DIAGNOSIS — Z11.52 ENCOUNTER FOR SCREENING FOR COVID-19: ICD-10-CM

## 2023-03-15 DIAGNOSIS — R11.0 NAUSEA: ICD-10-CM

## 2023-03-15 DIAGNOSIS — R07.89 CHEST TIGHTNESS: Primary | ICD-10-CM

## 2023-03-15 DIAGNOSIS — R05.9 COUGH, UNSPECIFIED TYPE: ICD-10-CM

## 2023-03-15 DIAGNOSIS — R06.00 DYSPNEA, UNSPECIFIED TYPE: ICD-10-CM

## 2023-03-15 RX ORDER — PREDNISONE 10 MG/1
TABLET ORAL
Qty: 21 TABLET | Refills: 0 | Status: SHIPPED | OUTPATIENT
Start: 2023-03-15

## 2023-03-15 RX ORDER — ONDANSETRON HYDROCHLORIDE 8 MG/1
8 TABLET, FILM COATED ORAL EVERY 8 HOURS PRN
Qty: 20 TABLET | Refills: 0 | Status: SHIPPED | OUTPATIENT
Start: 2023-03-15

## 2023-03-15 RX ORDER — ALBUTEROL SULFATE 90 UG/1
2 AEROSOL, METERED RESPIRATORY (INHALATION) EVERY 6 HOURS PRN
Qty: 18 G | Refills: 0 | Status: SHIPPED | OUTPATIENT
Start: 2023-03-15

## 2023-03-15 NOTE — PATIENT INSTRUCTIONS
--Rest, drink plenty of fluids  --COVID and flu testing sent  Will call with results  Average turn around time is 24-48 hours  --Advise self-quarantining until you hear back from us regarding test results (at the earliest)  This involves not leaving your house, wearing a mask at all times while outside your immediate living area, and disinfecting all commonly used surfaces and personal items  If your COVID test results are positive, you will need to self-quarantine at home for at least 5 days from the onset of your symptoms, until you are feeling better, and until you are without a fever for at least 24 hours  --Take oral steroid (prednisone) as prescribed  --Inhaler as needed for chest tightness/wheezing  --For nasal/sinus congestion, helpful measures include steam, warm compresses, an OTC saline nasal spray or Neti pot, or an OTC decongestant (such as Sudafed)  The decongestant should be avoided, however, if you are under 10years of age, or have a history of high blood pressure or heart disease  In addition, an OTC nasal steroid (Flonase, Nasocort) or nasal decongestant (Afrin, Luis Alfredo-synephrine) may be taken  The nasal steroid should be used at bedtime, after the saline nasal spray  The nasal decongestant should not be taken more than 3 days consecutively in order to prevent rebound congestion  --For nasal drainage, postnasal drip, sneezing and itching, an OTC antihistamine (Allegra, Benadryl, etc) can be taken  --For cough, you can take an OTC expectorant such as plain Robitussion or Mucinex (active ingredient guaifenesin)  A spoonful of honey at bedtime may also be helpful, as may a prescription cough medicine  Also recommended is the use of a cool mist humidifier (with or without Vicks) in the bedroom at night  --For sore throat, you can take OTC lozenges, use warm gargles (salt water or apple cider vinegar and honey), herbal teas, or an OTC throat spray (Chloraseptic)    --You can take Tylenol as needed for fever, headache, body aches  Motrin/Advil should be avoided, however, if you have a history of heart disease, bleeding ulcers, or if you take blood thinners  --You should contact your primary care provider and/or go to the ER if your symptoms are not improved or get worse over the next 24-48 hours

## 2023-03-15 NOTE — PROGRESS NOTES
[facility]        NAME: Radha Martin is a 74 y o  female  : 1955    MRN: [id number]  DATE: March 15, 2023  TIME: 9:33 AM    Assessment and Plan   Chest tightness [R07 89]  1  Chest tightness  POCT ECG   2  Dyspnea, unspecified type     3  Nausea     4  Cough, unspecified type     5  Encounter for screening for COVID-19  Covid/Flu-Office Collect        --EKG without specific abnormalities  --Suspect viral URI + COPD exacerbation  Address per below  Patient Instructions     --Rest, drink plenty of fluids  --COVID and flu testing sent  Will call with results  Average turn around time is 24-48 hours  --Advise self-quarantining until you hear back from us regarding test results (at the earliest)  This involves not leaving your house, wearing a mask at all times while outside your immediate living area, and disinfecting all commonly used surfaces and personal items  If your COVID test results are positive, you will need to self-quarantine at home for at least 5 days from the onset of your symptoms, until you are feeling better, and until you are without a fever for at least 24 hours  --Take oral steroid (prednisone) as prescribed  --Inhaler as needed for chest tightness/wheezing  --For nasal/sinus congestion, helpful measures include steam, warm compresses, an OTC saline nasal spray or Neti pot, or an OTC decongestant (such as Sudafed)  The decongestant should be avoided, however, if you are under 10years of age, or have a history of high blood pressure or heart disease  In addition, an OTC nasal steroid (Flonase, Nasocort) or nasal decongestant (Afrin, Luis Alfredo-synephrine) may be taken  The nasal steroid should be used at bedtime, after the saline nasal spray  The nasal decongestant should not be taken more than 3 days consecutively in order to prevent rebound congestion     --For nasal drainage, postnasal drip, sneezing and itching, an OTC antihistamine (Allegra, Benadryl, etc) can be taken    --For cough, you can take an OTC expectorant such as plain Robitussion or Mucinex (active ingredient guaifenesin)  A spoonful of honey at bedtime may also be helpful, as may a prescription cough medicine  Also recommended is the use of a cool mist humidifier (with or without Vicks) in the bedroom at night  --For sore throat, you can take OTC lozenges, use warm gargles (salt water or apple cider vinegar and honey), herbal teas, or an OTC throat spray (Chloraseptic)  --You can take Tylenol as needed for fever, headache, body aches  Motrin/Advil should be avoided, however, if you have a history of heart disease, bleeding ulcers, or if you take blood thinners  --You should contact your primary care provider and/or go to the ER if your symptoms are not improved or get worse over the next 12-24 hours  Chief Complaint     Chief Complaint   Patient presents with   • Cold Like Symptoms     Pt is having some nausea x 5 days and she is also complained of tightness in her chest and feeling sob in her "bronchial tubes"  Pt is not sure is she has covid  She thinks she might have bronchitis  History of Present Illness       Here with complaints of nausea, chest tightness, increased dyspnea with exertion x 5 days  Symptoms are persistent  Chest tightness felt upper mid chest with radiation bilaterally  Some nasal congestion, dry cough x 5 days also  Occasional PND  No rhinorrhea  Sore throat since this morning  Headache yesterday only  No body aches, calf pain  No fever/chills, abdominal pain, vomiting, diarrhea  No OTC meds taken  No known sick contacts  PMHx notable for CAD, MI (2018), COPD  Baseline GERD/heartburn  Takes Tums for this, only  Review of Systems   Review of Systems   Constitutional: Negative for fever  HENT: Positive for sore throat  Negative for rhinorrhea            Current Medications       Current Outpatient Medications:   •  aspirin 81 mg chewable tablet, Chew 81 mg daily, Disp: , Rfl:   •  atorvastatin (LIPITOR) 40 mg tablet, Take 40 mg by mouth daily, Disp: , Rfl:   •  ezetimibe (ZETIA) 10 mg tablet, Take 10 mg by mouth daily, Disp: , Rfl:   •  ipratropium (ATROVENT) 0 03 % nasal spray, instill 2 sprays into each nostril two to three times a day into each nostril as directed, Disp: , Rfl:   •  nicotine (NICODERM CQ) 21 mg/24 hr TD 24 hr patch, Place 1 patch on the skin daily, Disp: 28 patch, Rfl: 0    Current Allergies     Allergies as of 03/15/2023 - Reviewed 03/15/2023   Allergen Reaction Noted   • Erythromycin Hives and Itching 10/21/2015   • Nizatidine Other (See Comments) 06/25/2008   • Penicillins Hives and Itching 06/25/2008   • Wellbutrin [bupropion] Other (See Comments) 03/01/2016   • Ciprofloxacin hcl Hives 03/01/2016   • Fish-derived products - food allergy GI Intolerance 03/01/2016   • Other Hives 03/01/2016   • Ampicillin  01/14/2013   • Benadryl [diphenhydramine hcl] Other (See Comments) 03/01/2016   • Ciprofloxacin  06/25/2008   • Clindamycin  04/11/2016   • Diphenhydramine  06/25/2008   • Doxycycline  01/19/2018   • Lovastatin  09/25/2008   • Nickel Other (See Comments) 09/10/2008   • Shellfish-derived products - food allergy GI Intolerance 03/07/2016            The following portions of the patient's history were reviewed and updated as appropriate: allergies, current medications, past family history, past medical history, past social history, past surgical history and problem list      Past Medical History:   Diagnosis Date   • Acid reflux    • Anxiety    • Arthritis    • Cancer ([facility] [address] )     skin on chin   • Cardiac disease    • Chronic kidney disease     (R) kidney smaller than (L),  kidney stones   • Chronic UTI (urinary tract infection)    • Colitis     microscopic   • COPD (chronic obstructive pulmonary disease) ([facility] [address] )     mild, recent dx   • Depression    • Epidermal cyst of face     last assessed 10-   • GERD (gastroesophageal reflux disease)    • Heart attack ([facility] [address] ) 09/20/2018   • [place] (hard of hearing)     deaf (L) ear, decreased hearing (R) ear   • Hyperlipemia     diet controlled   • Hyperlipidemia    • IBS (irritable bowel syndrome)    • Malignant neoplasm of skin    • Meniere disease    • Migraine    • Myocardial infarction ([facility] [address] ) 09/2018   • Numbness and tingling of left side of face    • Uterine leiomyoma        Past Surgical History:   Procedure Laterality Date   • CARDIAC CATHETERIZATION      9/21/18 [facility] - severe CAD of the LAD and diagonal artery   • COLONOSCOPY N/A 11/4/2016    Procedure: COLONOSCOPY;  Surgeon: Kishor Escobar MD;  Location: [facility] GI LAB; Service:    • ERCP     • ESOPHAGOGASTRODUODENOSCOPY N/A 11/4/2016    Procedure: ESOPHAGOGASTRODUODENOSCOPY (EGD); Surgeon: Kishor Escobar MD;  Location: [facility] GI LAB; Service:    • EYE SURGERY     • VA XCAPSL CTRC RMVL INSJ IO LENS PROSTH W/O ECP Left 3/7/2016    Procedure: EXTRACTION EXTRACAPSULAR CATARACT PHACO INTRAOCULAR LENS (IOL); Surgeon: Adrian Pastor MD;  Location: [facility] MAIN OR;  Service: Ophthalmology   • SKIN CANCER EXCISION      excision skin cancer on chin       Family History   Problem Relation Age of Onset   • Heart disease Mother         cardiac disorder   • Lung cancer Mother    • COPD Mother    • Brain cancer Father    • Colonic polyp Sister    • Thyroid cancer Sister    • Skin cancer Brother    • Skin cancer Brother    • Diabetes Son    • Neuropathy Son    • Multiple sclerosis Son    • No Known Problems Son    • Colon cancer Maternal Grandmother    • Breast cancer Cousin 20         Medications have been verified  Objective   BP (!) 176/82   Pulse (!) 115   Temp 98 1 °F (36 7 °C)   Resp (!) 28   Ht 5' 2" (1 575 m)   Wt 76 2 kg (168 lb)   SpO2 96%   BMI 30 73 kg/m²   No LMP recorded  Patient is postmenopausal        Physical Exam     Physical Exam  Constitutional:       General: She is not in acute distress  Appearance: She is not ill-appearing  HENT:      Right Ear: Tympanic membrane and ear canal normal       Left Ear: Tympanic membrane and ear canal normal       Nose: Congestion present  No rhinorrhea  Mouth/Throat:      Pharynx: No oropharyngeal exudate or posterior oropharyngeal erythema  Cardiovascular:      Rate and Rhythm: Regular rhythm  Tachycardia present  Heart sounds: No murmur heard  Pulmonary:      Breath sounds: No stridor  Wheezing present  No rhonchi or rales  Comments: Breathing mildly tachypneic at rest (26-28)  NO cough noted  Few faint scattered wheezes on left  Normal BS otherwise  Chest:      Chest wall: No tenderness  Abdominal:      General: Abdomen is flat  Palpations: Abdomen is soft  Tenderness: There is no abdominal tenderness  Musculoskeletal:         General: No tenderness  Comments: No calf tenderness noted  Lymphadenopathy:      Cervical: No cervical adenopathy  Neurological:      Mental Status: She is alert     Psychiatric:         Mood and Affect: Mood normal

## 2023-03-16 LAB
ATRIAL RATE: 82 BPM
FLUAV RNA RESP QL NAA+PROBE: NEGATIVE
FLUBV RNA RESP QL NAA+PROBE: NEGATIVE
P AXIS: 47 DEGREES
PR INTERVAL: 142 MS
QRS AXIS: 23 DEGREES
QRSD INTERVAL: 82 MS
QT INTERVAL: 354 MS
QTC INTERVAL: 413 MS
SARS-COV-2 RNA RESP QL NAA+PROBE: NEGATIVE
T WAVE AXIS: 32 DEGREES
VENTRICULAR RATE: 82 BPM

## 2023-03-28 ENCOUNTER — VBI (OUTPATIENT)
Dept: FAMILY MEDICINE CLINIC | Facility: CLINIC | Age: 68
End: 2023-03-28

## 2023-03-28 ENCOUNTER — HOSPITAL ENCOUNTER (EMERGENCY)
Facility: HOSPITAL | Age: 68
Discharge: HOME | End: 2023-03-28
Attending: EMERGENCY MEDICINE

## 2023-03-28 ENCOUNTER — APPOINTMENT (EMERGENCY)
Dept: RADIOLOGY | Facility: HOSPITAL | Age: 68
End: 2023-03-28

## 2023-03-28 VITALS
OXYGEN SATURATION: 96 % | HEART RATE: 72 BPM | TEMPERATURE: 98.2 F | RESPIRATION RATE: 18 BRPM | SYSTOLIC BLOOD PRESSURE: 172 MMHG | DIASTOLIC BLOOD PRESSURE: 82 MMHG

## 2023-03-28 DIAGNOSIS — R03.0 ELEVATED BLOOD PRESSURE READING: Primary | ICD-10-CM

## 2023-03-28 LAB
ALBUMIN SERPL BCP-MCNC: 4.1 G/DL (ref 3.5–5)
ALP SERPL-CCNC: 100 U/L (ref 34–104)
ALT SERPL W P-5'-P-CCNC: 25 U/L (ref 7–52)
ANION GAP SERPL CALCULATED.3IONS-SCNC: 7 MMOL/L (ref 4–13)
AST SERPL W P-5'-P-CCNC: 21 U/L (ref 13–39)
BASOPHILS # BLD AUTO: 0.08 THOUSANDS/ÂΜL (ref 0–0.1)
BASOPHILS NFR BLD AUTO: 1 % (ref 0–1)
BILIRUB SERPL-MCNC: 0.41 MG/DL (ref 0.2–1)
BUN SERPL-MCNC: 14 MG/DL (ref 5–25)
CALCIUM SERPL-MCNC: 10 MG/DL (ref 8.4–10.2)
CARDIAC TROPONIN I PNL SERPL HS: 2 NG/L
CHLORIDE SERPL-SCNC: 109 MMOL/L (ref 96–108)
CO2 SERPL-SCNC: 24 MMOL/L (ref 21–32)
CREAT SERPL-MCNC: 0.78 MG/DL (ref 0.6–1.3)
EOSINOPHIL # BLD AUTO: 0.32 THOUSAND/ÂΜL (ref 0–0.61)
EOSINOPHIL NFR BLD AUTO: 4 % (ref 0–6)
ERYTHROCYTE [DISTWIDTH] IN BLOOD BY AUTOMATED COUNT: 13.4 % (ref 11.6–15.1)
GFR SERPL CREATININE-BSD FRML MDRD: 80 ML/MIN/1.73SQ M
GLUCOSE SERPL-MCNC: 107 MG/DL (ref 65–140)
HCT VFR BLD AUTO: 44.2 % (ref 34.8–46.1)
HGB BLD-MCNC: 14.2 G/DL (ref 11.5–15.4)
IMM GRANULOCYTES # BLD AUTO: 0.05 THOUSAND/UL (ref 0–0.2)
IMM GRANULOCYTES NFR BLD AUTO: 1 % (ref 0–2)
LYMPHOCYTES # BLD AUTO: 1.69 THOUSANDS/ÂΜL (ref 0.6–4.47)
LYMPHOCYTES NFR BLD AUTO: 19 % (ref 14–44)
MCH RBC QN AUTO: 29.3 PG (ref 26.8–34.3)
MCHC RBC AUTO-ENTMCNC: 32.1 G/DL (ref 31.4–37.4)
MCV RBC AUTO: 91 FL (ref 82–98)
MONOCYTES # BLD AUTO: 0.82 THOUSAND/ÂΜL (ref 0.17–1.22)
MONOCYTES NFR BLD AUTO: 9 % (ref 4–12)
NEUTROPHILS # BLD AUTO: 5.8 THOUSANDS/ÂΜL (ref 1.85–7.62)
NEUTS SEG NFR BLD AUTO: 66 % (ref 43–75)
NRBC BLD AUTO-RTO: 0 /100 WBCS
PLATELET # BLD AUTO: 211 THOUSANDS/UL (ref 149–390)
PMV BLD AUTO: 9.9 FL (ref 8.9–12.7)
POTASSIUM SERPL-SCNC: 3.6 MMOL/L (ref 3.5–5.3)
PROT SERPL-MCNC: 6.7 G/DL (ref 6.4–8.4)
RBC # BLD AUTO: 4.84 MILLION/UL (ref 3.81–5.12)
SODIUM SERPL-SCNC: 140 MMOL/L (ref 135–147)
WBC # BLD AUTO: 8.76 THOUSAND/UL (ref 4.31–10.16)

## 2023-03-28 NOTE — TELEPHONE ENCOUNTER
[facility]    ED Visit Information     Ed visit date: 3-27-23  Diagnosis Description: Elevated blood pressure reading  In Network? Yes Per Orozco  Discharge status: Home  Discharged with meds ? No  Number of ED visits to date: 1  ED Severity:3     Outreach Information    Outreach successful: Yes 2  Date letter mailed:no  Date Finalized:3-29-23    Care Coordination    Follow up appointment with pcp: no 0  Transportation issues ? NA    [employer] type: 7 Day Outreach  Patient refsued the answer questions: Yes  Emergent necessity warranted by diagnosis: Yes  [facility] PCP: Yes  Transportation: Friend/Family Transport  Called PCP first?: No  Practice Contacted Patient ?: No  Pt had ED follow up with pcp/staff ?: No    Reason Patient went to ED instead of Urgent Care or PCP?: Patient Refused to Answer Questions  3-28-23   Left Message - prac name [facility] fp  3-29-23  Call Complete - prac name for[facility] fp pt has a vascular appt 3-30  - briefly spoke with patent, as I was asking her questions she hung up on me  No further outreach needed

## 2023-03-28 NOTE — ED PROVIDER NOTES
History  Chief Complaint   Patient presents with   • Hypertension     Pt states she has been having frequent high BP readings today  Denies any sx  States she is not currently on BP medication  History provided by:  Patient   used: No    Hypertension  Associated symptoms: no abdominal pain, no chest pain, no dizziness, no fever, no headaches, no nausea, no neck pain, no palpitations, no shortness of breath, not vomiting and no weakness      29-year-old female presenting to emergency department with elevated blood pressure reading today  Does not have history of hypertension  States in the past she has had this happen before but it normalizes  Did not get better during the day which made her visit the ER  Denies any symptoms  No headache or neck pain  No chest pain  No shortness of breath  No abdominal pain  No back pain  No nausea vomiting  No vision changes  No weakness numbness or tingling  Prior to Admission Medications   Prescriptions Last Dose Informant Patient Reported? Taking?    albuterol (Ventolin HFA) 90 mcg/act inhaler   No No   Sig: Inhale 2 puffs every 6 (six) hours as needed for wheezing   aspirin 81 mg chewable tablet   Yes No   Sig: Chew 81 mg daily   atorvastatin (LIPITOR) 40 mg tablet   Yes No   Sig: Take 40 mg by mouth daily   ezetimibe (ZETIA) 10 mg tablet   Yes No   Sig: Take 10 mg by mouth daily   ipratropium (ATROVENT) 0 03 % nasal spray   Yes No   Sig: instill 2 sprays into each nostril two to three times a day into each nostril as directed   nicotine (NICODERM CQ) 21 mg/24 hr TD 24 hr patch   No No   Sig: Place 1 patch on the skin daily   ondansetron (ZOFRAN) 8 mg tablet   No No   Sig: Take 1 tablet (8 mg total) by mouth every 8 (eight) hours as needed for nausea or vomiting   predniSONE 10 mg tablet   No No   Sig: TAKE 6 TAB PO DAILY X 1 DAY, THEN 5 TAB DAILY X 1 DAY, THEN 4 TAB DAILY X 1 DAY, THEN 3 TAB DAILY X 1 DAY, THEN 2 TAB DAILY X 1 DAY, THEN 1 TAB DAILY X 1 DAY      Facility-Administered Medications: None       Past Medical History:   Diagnosis Date   • Acid reflux    • Anxiety    • Arthritis    • Cancer (HCC)     skin on chin   • Cardiac disease    • Chronic kidney disease     (R) kidney smaller than (L),  kidney stones   • Chronic UTI (urinary tract infection)    • Colitis     microscopic   • COPD (chronic obstructive pulmonary disease) (HCC)     mild, recent dx   • Depression    • Epidermal cyst of face     last assessed 10-   • GERD (gastroesophageal reflux disease)    • Heart attack ([facility] [address] ) 09/20/2018   • [facility] (hard of hearing)     deaf (L) ear, decreased hearing (R) ear   • Hyperlipemia     diet controlled   • Hyperlipidemia    • IBS (irritable bowel syndrome)    • Malignant neoplasm of skin    • Meniere disease    • Migraine    • [facility] 09/2018   • Numbness and tingling of left side of face    • Uterine leiomyoma        Past Surgical History:   Procedure Laterality Date   • CARDIAC CATHETERIZATION      9/21/18 [facility] - severe CAD of the LAD and diagonal artery   • COLONOSCOPY N/A 11/4/2016    Procedure: COLONOSCOPY;  Surgeon: Jasmin Santo MD;  Location: [facility] GI LAB; Service:    • ERCP     • ESOPHAGOGASTRODUODENOSCOPY N/A 11/4/2016    Procedure: ESOPHAGOGASTRODUODENOSCOPY (EGD); Surgeon: Jasmin Santo MD;  Location: [facility] GI LAB; Service:    • EYE SURGERY     • WY XCAPSL CTRC RMVL INSJ IO LENS PROSTH W/O ECP Left 3/7/2016    Procedure: EXTRACTION EXTRACAPSULAR CATARACT PHACO INTRAOCULAR LENS (IOL);   Surgeon: Kayla Frost MD;  Location: [facility] MAIN OR;  Service: Ophthalmology   • SKIN CANCER EXCISION      excision skin cancer on chin       Family History   Problem Relation Age of Onset   • Heart disease Mother         cardiac disorder   • Lung cancer Mother    • COPD Mother    • Brain cancer Father    • Colonic polyp Sister    • Thyroid cancer Sister    • Skin cancer Brother    • Skin cancer Brother • Diabetes Son    • Neuropathy Son    • Multiple sclerosis Son    • No Known Problems Son    • Colon cancer Maternal Grandmother    • Breast cancer Cousin 21     I have reviewed and agree with the history as documented  E-Cigarette/Vaping     E-Cigarette/Vaping Substances     Social History     Tobacco Use   • Smoking status: Every Day     Packs/day: 1 00     Years: 50 00     Pack years: 50 00     Types: Cigarettes   • Smokeless tobacco: Never   • Tobacco comments:     45+ years per allscripts   Substance Use Topics   • Alcohol use: No   • Drug use: No       Review of Systems   Constitutional: Negative for chills, diaphoresis and fever  HENT: Negative for congestion and sore throat  Respiratory: Negative for cough, shortness of breath, wheezing and stridor  Cardiovascular: Negative for chest pain, palpitations and leg swelling  Gastrointestinal: Negative for abdominal pain, blood in stool, diarrhea, nausea and vomiting  Genitourinary: Negative for dysuria, frequency and urgency  Musculoskeletal: Negative for neck pain and neck stiffness  Skin: Negative for pallor and rash  Neurological: Negative for dizziness, syncope, weakness, light-headedness and headaches  All other systems reviewed and are negative  Physical Exam  Physical Exam  Vitals reviewed  Constitutional:       Appearance: Normal appearance  She is well-developed  HENT:      Head: Normocephalic and atraumatic  Eyes:      Extraocular Movements: Extraocular movements intact  Pupils: Pupils are equal, round, and reactive to light  Cardiovascular:      Rate and Rhythm: Normal rate and regular rhythm  Heart sounds: Normal heart sounds  Pulmonary:      Effort: Pulmonary effort is normal  No respiratory distress  Breath sounds: Normal breath sounds  Abdominal:      General: Bowel sounds are normal       Palpations: Abdomen is soft  Tenderness: There is no abdominal tenderness     Musculoskeletal: General: No swelling or tenderness  Normal range of motion  Cervical back: Normal range of motion and neck supple  Skin:     General: Skin is warm and dry  Capillary Refill: Capillary refill takes less than 2 seconds  Neurological:      General: No focal deficit present  Mental Status: She is alert and oriented to person, place, and time           Vital Signs  ED Triage Vitals   Temperature Pulse Respirations Blood Pressure SpO2   03/27/23 2344 03/27/23 2345 03/27/23 2345 03/27/23 2345 03/27/23 2345   98 2 °F (36 8 °C) 99 18 (!) 201/91 98 %      Temp Source Heart Rate Source Patient Position - Orthostatic VS BP Location FiO2 (%)   03/27/23 2344 03/27/23 2345 03/27/23 2345 03/27/23 2345 --   Oral Monitor Lying Right arm       Pain Score       --                  Vitals:    03/27/23 2345 03/28/23 0044 03/28/23 0100   BP: (!) 201/91 (!) 180/84 (!) 172/82   Pulse: 99  72   Patient Position - Orthostatic VS: Lying  Lying         Visual Acuity      ED Medications  Medications - No data to display    Diagnostic Studies  Results Reviewed     Procedure Component Value Units Date/Time    HS Troponin 0hr (reflex protocol) [[id number]]  (Normal) Collected: 03/27/23 2354    Lab Status: Final result Specimen: Blood from Arm, Left Updated: 03/28/23 0216     hs TnI 0hr 2 ng/L     Comprehensive metabolic panel [[id number]]  (Abnormal) Collected: 03/27/23 2354    Lab Status: Final result Specimen: Blood from Arm, Left Updated: 03/28/23 0209     Sodium 140 mmol/L      Potassium 3 6 mmol/L      Chloride 109 mmol/L      CO2 24 mmol/L      ANION GAP 7 mmol/L      BUN 14 mg/dL      Creatinine 0 78 mg/dL      Glucose 107 mg/dL      Calcium 10 0 mg/dL      AST 21 U/L      ALT 25 U/L      Alkaline Phosphatase 100 U/L      Total Protein 6 7 g/dL      Albumin 4 1 g/dL      Total Bilirubin 0 41 mg/dL      eGFR 80 ml/min/1 73sq m     Narrative:      [place] guidelines for Chronic Kidney Disease (CKD): •  Stage 1 with normal or high GFR (GFR > 90 mL/min/1 73 square meters)  •  Stage 2 Mild CKD (GFR = 60-89 mL/min/1 73 square meters)  •  Stage 3A Moderate CKD (GFR = 45-59 mL/min/1 73 square meters)  •  Stage 3B Moderate CKD (GFR = 30-44 mL/min/1 73 square meters)  •  Stage 4 Severe CKD (GFR = 15-29 mL/min/1 73 square meters)  •  Stage 5 End Stage CKD (GFR <15 mL/min/1 73 square meters)  Note: GFR calculation is accurate only with a steady state creatinine    CBC and differential [[id number]] Collected: 03/27/23 5466    Lab Status: Final result Specimen: Blood from Arm, Left Updated: 03/28/23 0147     WBC 8 76 Thousand/uL      RBC 4 84 Million/uL      Hemoglobin 14 2 g/dL      Hematocrit 44 2 %      MCV 91 fL      MCH 29 3 pg      MCHC 32 1 g/dL      RDW 13 4 %      MPV 9 9 fL      Platelets 354 Thousands/uL      nRBC 0 /100 WBCs      Neutrophils Relative 66 %      Immat GRANS % 1 %      Lymphocytes Relative 19 %      Monocytes Relative 9 %      Eosinophils Relative 4 %      Basophils Relative 1 %      Neutrophils Absolute 5 80 Thousands/µL      Immature Grans Absolute 0 05 Thousand/uL      Lymphocytes Absolute 1 69 Thousands/µL      Monocytes Absolute 0 82 Thousand/µL      Eosinophils Absolute 0 32 Thousand/µL      Basophils Absolute 0 08 Thousands/µL                  XR chest 1 view portable    (Results Pending)              Procedures  Procedures         ED Course  ED Course as of 03/28/23 0620   Tue Mar 28, 2023   0147 ECG shows rate of 64, sinus, normal axis, normal QRS, no significant ST or T wave changes, normal intervals, independently interpret by me                                             Medical Decision Making  44-year-old with asymptomatic hypertension  We will check kidney function, cardiac enzyme, EKG, chest x-ray    Chest x-ray without acute findings  Work-up otherwise negative  Outpatient follow-up with PCP  Discussed with patient    She understands the importance of following up with PCP     Amount and/or Complexity of Data Reviewed  Labs: ordered  Radiology: ordered  Disposition  Final diagnoses:   Elevated blood pressure reading     Time reflects when diagnosis was documented in both MDM as applicable and the Disposition within this note     Time User Action Codes Description Comment    3/28/2023  2:22 AM Chanda Smith Add [R03 0] Elevated blood pressure reading       ED Disposition     ED Disposition   Discharge    Condition   Stable    Date/Time   Tue Mar 28, 2023  2:22 AM    Comment   Gonzalo Aranda discharge to home/self care                 Follow-up Information     Follow up With Specialties Details Why Contact Info Additional Information    Dulce Maria Flanagan, DO Family Medicine In 2 days Reevaluation [id number] [facility] [address]  [address]       [facility] Emergency Department Emergency Medicine  As needed, If symptoms worsen [address] Emergency Department, [address], [place], [address]          Discharge Medication List as of 3/28/2023  2:27 AM      CONTINUE these medications which have NOT CHANGED    Details   albuterol (Ventolin HFA) 90 mcg/act inhaler Inhale 2 puffs every 6 (six) hours as needed for wheezing, Starting Wed 3/15/2023, Normal      aspirin 81 mg chewable tablet Chew 81 mg daily, Historical Med      atorvastatin (LIPITOR) 40 mg tablet Take 40 mg by mouth daily, Starting Fri 12/23/2022, Historical Med      ezetimibe (ZETIA) 10 mg tablet Take 10 mg by mouth daily, Historical Med      ipratropium (ATROVENT) 0 03 % nasal spray instill 2 sprays into each nostril two to three times a day into each nostril as directed, Historical Med      nicotine (NICODERM CQ) 21 mg/24 hr TD 24 hr patch Place 1 patch on the skin daily, Starting Sat 9/17/2022, Normal      ondansetron (ZOFRAN) 8 mg tablet Take 1 tablet (8 mg total) by mouth every 8 (eight) hours as needed for nausea or vomiting, Starting Wed 3/15/2023, Normal      predniSONE 10 mg tablet TAKE 6 TAB PO DAILY X 1 DAY, THEN 5 TAB DAILY X 1 DAY, THEN 4 TAB DAILY X 1 DAY, THEN 3 TAB DAILY X 1 DAY, THEN 2 TAB DAILY X 1 DAY, THEN 1 TAB DAILY X 1 DAY, Normal             No discharge procedures on file      PDMP Review     None          ED Provider  Electronically Signed by           Edmundo Spear MD  03/28/23 2645

## 2023-03-28 NOTE — DISCHARGE INSTRUCTIONS
Please make sure to follow-up with family doctor    Return to ER if having chest pain, trouble breathing, dizzy, passout or feeling worse overall

## 2023-03-29 LAB
ATRIAL RATE: 64 BPM
ATRIAL RATE: 87 BPM
P AXIS: 62 DEGREES
P AXIS: 72 DEGREES
PR INTERVAL: 148 MS
PR INTERVAL: 152 MS
QRS AXIS: 65 DEGREES
QRS AXIS: 78 DEGREES
QRSD INTERVAL: 74 MS
QRSD INTERVAL: 76 MS
QT INTERVAL: 352 MS
QT INTERVAL: 408 MS
QTC INTERVAL: 420 MS
QTC INTERVAL: 423 MS
T WAVE AXIS: 59 DEGREES
T WAVE AXIS: 72 DEGREES
VENTRICULAR RATE: 64 BPM
VENTRICULAR RATE: 87 BPM

## 2023-03-30 ENCOUNTER — HOSPITAL ENCOUNTER (OUTPATIENT)
Dept: VASCULAR ULTRASOUND | Facility: HOSPITAL | Age: 68
Discharge: HOME | End: 2023-03-30

## 2023-03-30 DIAGNOSIS — I10 ESSENTIAL (PRIMARY) HYPERTENSION: ICD-10-CM

## 2023-05-26 ENCOUNTER — APPOINTMENT (EMERGENCY)
Dept: CT IMAGING | Facility: HOSPITAL | Age: 68
End: 2023-05-26

## 2023-05-26 ENCOUNTER — HOSPITAL ENCOUNTER (EMERGENCY)
Facility: HOSPITAL | Age: 68
Discharge: HOME | End: 2023-05-27
Attending: EMERGENCY MEDICINE

## 2023-05-26 VITALS
TEMPERATURE: 97.8 F | OXYGEN SATURATION: 96 % | DIASTOLIC BLOOD PRESSURE: 70 MMHG | HEART RATE: 72 BPM | RESPIRATION RATE: 18 BRPM | SYSTOLIC BLOOD PRESSURE: 157 MMHG

## 2023-05-26 DIAGNOSIS — I10 HIGH BLOOD PRESSURE: Primary | ICD-10-CM

## 2023-05-26 DIAGNOSIS — R51.9 HEADACHE: ICD-10-CM

## 2023-05-26 LAB
ALBUMIN SERPL BCP-MCNC: 4.1 G/DL (ref 3.5–5)
ALP SERPL-CCNC: 95 U/L (ref 34–104)
ALT SERPL W P-5'-P-CCNC: 26 U/L (ref 7–52)
ANION GAP SERPL CALCULATED.3IONS-SCNC: 5 MMOL/L (ref 4–13)
AST SERPL W P-5'-P-CCNC: 19 U/L (ref 13–39)
ATRIAL RATE: 63 BPM
BASOPHILS # BLD AUTO: 0.07 THOUSANDS/ÂΜL (ref 0–0.1)
BASOPHILS NFR BLD AUTO: 1 % (ref 0–1)
BILIRUB SERPL-MCNC: 0.49 MG/DL (ref 0.2–1)
BUN SERPL-MCNC: 15 MG/DL (ref 5–25)
CALCIUM SERPL-MCNC: 9.6 MG/DL (ref 8.4–10.2)
CHLORIDE SERPL-SCNC: 110 MMOL/L (ref 96–108)
CO2 SERPL-SCNC: 26 MMOL/L (ref 21–32)
CREAT SERPL-MCNC: 0.75 MG/DL (ref 0.6–1.3)
EOSINOPHIL # BLD AUTO: 0.31 THOUSAND/ÂΜL (ref 0–0.61)
EOSINOPHIL NFR BLD AUTO: 4 % (ref 0–6)
ERYTHROCYTE [DISTWIDTH] IN BLOOD BY AUTOMATED COUNT: 13.2 % (ref 11.6–15.1)
GFR SERPL CREATININE-BSD FRML MDRD: 83 ML/MIN/1.73SQ M
GLUCOSE SERPL-MCNC: 107 MG/DL (ref 65–140)
HCT VFR BLD AUTO: 41.8 % (ref 34.8–46.1)
HGB BLD-MCNC: 13 G/DL (ref 11.5–15.4)
IMM GRANULOCYTES # BLD AUTO: 0.03 THOUSAND/UL (ref 0–0.2)
IMM GRANULOCYTES NFR BLD AUTO: 0 % (ref 0–2)
LYMPHOCYTES # BLD AUTO: 1.48 THOUSANDS/ÂΜL (ref 0.6–4.47)
LYMPHOCYTES NFR BLD AUTO: 17 % (ref 14–44)
MCH RBC QN AUTO: 29.3 PG (ref 26.8–34.3)
MCHC RBC AUTO-ENTMCNC: 31.1 G/DL (ref 31.4–37.4)
MCV RBC AUTO: 94 FL (ref 82–98)
MONOCYTES # BLD AUTO: 0.69 THOUSAND/ÂΜL (ref 0.17–1.22)
MONOCYTES NFR BLD AUTO: 8 % (ref 4–12)
NEUTROPHILS # BLD AUTO: 6.26 THOUSANDS/ÂΜL (ref 1.85–7.62)
NEUTS SEG NFR BLD AUTO: 70 % (ref 43–75)
NRBC BLD AUTO-RTO: 0 /100 WBCS
P AXIS: 68 DEGREES
PLATELET # BLD AUTO: 180 THOUSANDS/UL (ref 149–390)
PMV BLD AUTO: 9.6 FL (ref 8.9–12.7)
POTASSIUM SERPL-SCNC: 4.1 MMOL/L (ref 3.5–5.3)
PR INTERVAL: 170 MS
PROT SERPL-MCNC: 6.4 G/DL (ref 6.4–8.4)
QRS AXIS: 56 DEGREES
QRSD INTERVAL: 82 MS
QT INTERVAL: 398 MS
QTC INTERVAL: 407 MS
RBC # BLD AUTO: 4.44 MILLION/UL (ref 3.81–5.12)
SODIUM SERPL-SCNC: 141 MMOL/L (ref 135–147)
T WAVE AXIS: 50 DEGREES
VENTRICULAR RATE: 63 BPM
WBC # BLD AUTO: 8.84 THOUSAND/UL (ref 4.31–10.16)

## 2023-05-26 RX ORDER — ACETAMINOPHEN 325 MG/1
650 TABLET ORAL ONCE
Status: COMPLETED | OUTPATIENT
Start: 2023-05-26 | End: 2023-05-26

## 2023-05-26 RX ADMIN — ACETAMINOPHEN 650 MG: 325 TABLET ORAL at 23:04

## 2023-05-27 NOTE — ED ATTENDING ATTESTATION
5/26/2023  IAilyn, DO, saw and evaluated the patient  I have discussed the patient with the resident/non-physician practitioner and agree with the resident's/non-physician practitioner's findings, Plan of Care, and MDM as documented in the resident's/non-physician practitioner's note, except where noted  All available labs and Radiology studies were reviewed  I was present for key portions of any procedure(s) performed by the resident/non-physician practitioner and I was immediately available to provide assistance  At this point I agree with the current assessment done in the Emergency Department  I have conducted an independent evaluation of this patient a history and physical is as follows:    ED Course   74year old female presents to the ED for evaluation of elevated blood pressure  Patient has a history of hypertension and was started on labetalol 100 mg twice daily approximately 5 weeks ago  She states that she did have low blood pressures in the 115 range  She was symptomatic with that and was instructed to decrease the medication to 50 mg twice a day patient she has been taking this dose every day  Tonight she took an additional 50 mg after having elevated blood pressure  Tonight she had a mild headache  Patient checked her blood pressure several occasions this evening and it was elevated  She reports having mild headache  Last week she had issues with her balance where she felt off balance  She states that this was triggered by painting and using her arms overhead  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

## 2023-05-27 NOTE — DISCHARGE INSTRUCTIONS
Your work-up today was unremarkable  Follow-up with your PCP  Follow-up with your cardiologist for a better control of your blood pressure  Return sooner to the ED if you experience severe headache, difficulty talking, walking or feel worse

## 2023-05-27 NOTE — ED PROVIDER NOTES
"History  Chief Complaint   Patient presents with   • Hypertension     Pt from home, says she has HTN, took her pressure earlier and reports it was high  Took nightly labetalol + extra dose  Reports BP kept climbing to \"220/105\"  Reports headache, no vision changes  62-year-old female, past medical history of hypertension presented for evaluation of elevated blood pressure  Patient reported that earlier this evening, she took her blood pressure and it was 159/87  Patient  normally take 50 mg of labetalol in the morning and 50 mg at night  Patient stated about an hour after taking her blood pressure, she started having headache and took an additional dose  of her labetalol  Subsequent blood pressure measurements were still in the 220's /105 even after taking the additional dose of her labetalol  Patient currently reports headache however she denied any vision changes, chest pain, loss of breath, nausea, vomiting, palpitation or weakness  Patient seen at bedside neurologically intact and is in no acute distress  Prior to Admission Medications   Prescriptions Last Dose Informant Patient Reported? Taking?    LORazepam (ATIVAN) 0 5 mg tablet   Yes No   Sig: Take 0 5 mg by mouth 2 (two) times a day   albuterol (Ventolin HFA) 90 mcg/act inhaler   No No   Sig: Inhale 2 puffs every 6 (six) hours as needed for wheezing   aspirin 81 mg chewable tablet  Self Yes No   Sig: Chew 81 mg daily   atorvastatin (LIPITOR) 40 mg tablet   Yes No   Sig: Take 40 mg by mouth daily   ezetimibe (ZETIA) 10 mg tablet  Self Yes No   Sig: Take 10 mg by mouth daily   ipratropium (ATROVENT) 0 03 % nasal spray   Yes No   Sig: instill 2 sprays into each nostril two to three times a day into each nostril as directed   labetalol (NORMODYNE) 100 mg tablet   Yes No   Sig: Take 100 mg by mouth 2 (two) times a day   nicotine (NICODERM CQ) 21 mg/24 hr TD 24 hr patch   No No   Sig: Place 1 patch on the skin daily   Patient not taking: " Reported on 4/14/2023   ondansetron (ZOFRAN) 8 mg tablet   No No   Sig: Take 1 tablet (8 mg total) by mouth every 8 (eight) hours as needed for nausea or vomiting   Patient not taking: Reported on 4/14/2023   predniSONE 10 mg tablet   No No   Sig: TAKE 6 TAB PO DAILY X 1 DAY, THEN 5 TAB DAILY X 1 DAY, THEN 4 TAB DAILY X 1 DAY, THEN 3 TAB DAILY X 1 DAY, THEN 2 TAB DAILY X 1 DAY, THEN 1 TAB DAILY X 1 DAY   Patient not taking: Reported on 4/14/2023      Facility-Administered Medications: None       Past Medical History:   Diagnosis Date   • Acid reflux    • Anxiety    • Arthritis    • Cancer (HCC)     skin on chin   • Cardiac disease    • Chronic kidney disease     (R) kidney smaller than (L),  kidney stones   • Chronic UTI (urinary tract infection)    • Colitis     microscopic   • COPD (chronic obstructive pulmonary disease) (HCC)     mild, recent dx   • Depression    • Epidermal cyst of face     last assessed 10-   • GERD (gastroesophageal reflux disease)    • Heart attack ([facility] [address] ) 09/20/2018   • [place] (hard of hearing)     deaf (L) ear, decreased hearing (R) ear   • Hyperlipemia     diet controlled   • Hyperlipidemia    • IBS (irritable bowel syndrome)    • Malignant neoplasm of skin    • Meniere disease    • Migraine    • Myocardial infarction ([facility] [address] ) 09/2018   • Numbness and tingling of left side of face    • Uterine leiomyoma        Past Surgical History:   Procedure Laterality Date   • CARDIAC CATHETERIZATION      9/21/18 [facility] - severe CAD of the LAD and diagonal artery   • COLONOSCOPY N/A 11/4/2016    Procedure: COLONOSCOPY;  Surgeon: Katy Alegria MD;  Location: [facility] GI LAB; Service:    • ERCP     • ESOPHAGOGASTRODUODENOSCOPY N/A 11/4/2016    Procedure: ESOPHAGOGASTRODUODENOSCOPY (EGD); Surgeon: Katy Alegria MD;  Location: [facility] GI LAB;   Service:    • EYE SURGERY     • UT XCAPSL CTRC RMVL INSJ IO LENS PROSTH W/O ECP Left 3/7/2016    Procedure: EXTRACTION EXTRACAPSULAR CATARACT PHACO INTRAOCULAR LENS (IOL); Surgeon: Paradise Melchor MD;  Location: [facility] MAIN OR;  Service: Ophthalmology   • SKIN CANCER EXCISION      excision skin cancer on chin       Family History   Problem Relation Age of Onset   • Heart disease Mother         cardiac disorder   • Lung cancer Mother    • COPD Mother    • Brain cancer Father    • Colonic polyp Sister    • Thyroid cancer Sister    • Skin cancer Brother    • Skin cancer Brother    • Diabetes Son    • Neuropathy Son    • Multiple sclerosis Son    • No Known Problems Son    • Colon cancer Maternal Grandmother    • Breast cancer Cousin 20     I have reviewed and agree with the history as documented  E-Cigarette/Vaping     E-Cigarette/Vaping Substances     Social History     Tobacco Use   • Smoking status: Every Day     Packs/day: 1 00     Years: 50 00     Total pack years: 50 00     Types: Cigarettes   • Smokeless tobacco: Never   • Tobacco comments:     45+ years per allscripts   Substance Use Topics   • Alcohol use: No   • Drug use: No        Review of Systems   Constitutional: Negative for activity change, chills and fever  HENT: Negative for ear pain and sore throat  Eyes: Negative for pain and visual disturbance  Respiratory: Negative for cough and shortness of breath  Cardiovascular: Negative for chest pain and palpitations  Gastrointestinal: Negative for abdominal pain and vomiting  Genitourinary: Negative for dysuria and hematuria  Musculoskeletal: Negative for arthralgias and back pain  Skin: Negative for color change and rash  Neurological: Positive for headaches  Negative for dizziness, seizures, syncope, speech difficulty and weakness  All other systems reviewed and are negative        Physical Exam  ED Triage Vitals   Temperature Pulse Respirations Blood Pressure SpO2   05/26/23 2207 05/26/23 2207 05/26/23 2207 05/26/23 2207 05/26/23 2207   97 8 °F (36 6 °C) 86 18 (!) 226/102 99 %      Temp Source Heart Rate Source Patient Position - Orthostatic VS BP Location FiO2 (%)   05/26/23 2207 05/26/23 2207 05/26/23 2207 05/26/23 2207 --   Oral Monitor Lying Right arm       Pain Score       05/26/23 2229       7             Orthostatic Vital Signs  Vitals:    05/26/23 2207 05/26/23 2230 05/26/23 2300   BP: (!) 226/102 (!) 175/84 157/70   Pulse: 86 75 72   Patient Position - Orthostatic VS: Lying         Physical Exam  Vitals and nursing note reviewed  Constitutional:       General: She is not in acute distress  Appearance: She is well-developed  HENT:      Head: Normocephalic and atraumatic  Eyes:      Conjunctiva/sclera: Conjunctivae normal    Cardiovascular:      Rate and Rhythm: Normal rate and regular rhythm  Heart sounds: No murmur heard  Pulmonary:      Effort: Pulmonary effort is normal  No respiratory distress  Breath sounds: Normal breath sounds  Abdominal:      Palpations: Abdomen is soft  Tenderness: There is no abdominal tenderness  Musculoskeletal:         General: No swelling  Cervical back: Neck supple  Skin:     General: Skin is warm and dry  Capillary Refill: Capillary refill takes less than 2 seconds  Neurological:      General: No focal deficit present  Mental Status: She is alert and oriented to person, place, and time  Cranial Nerves: No cranial nerve deficit  Sensory: No sensory deficit  Motor: No weakness     Psychiatric:         Mood and Affect: Mood normal          ED Medications  Medications   acetaminophen (TYLENOL) tablet 650 mg (650 mg Oral Given 5/26/23 2304)       Diagnostic Studies  Results Reviewed     Procedure Component Value Units Date/Time    Comprehensive metabolic panel [[id number]]  (Abnormal) Collected: 05/26/23 2300    Lab Status: Final result Specimen: Blood from Arm, Left Updated: 05/26/23 2329     Sodium 141 mmol/L      Potassium 4 1 mmol/L      Chloride 110 mmol/L      CO2 26 mmol/L      ANION GAP 5 mmol/L      BUN 15 mg/dL Creatinine 0 75 mg/dL      Glucose 107 mg/dL      Calcium 9 6 mg/dL      AST 19 U/L      ALT 26 U/L      Alkaline Phosphatase 95 U/L      Total Protein 6 4 g/dL      Albumin 4 1 g/dL      Total Bilirubin 0 49 mg/dL      eGFR 83 ml/min/1 73sq m     Narrative:      [place] guidelines for Chronic Kidney Disease (CKD):   •  Stage 1 with normal or high GFR (GFR > 90 mL/min/1 73 square meters)  •  Stage 2 Mild CKD (GFR = 60-89 mL/min/1 73 square meters)  •  Stage 3A Moderate CKD (GFR = 45-59 mL/min/1 73 square meters)  •  Stage 3B Moderate CKD (GFR = 30-44 mL/min/1 73 square meters)  •  Stage 4 Severe CKD (GFR = 15-29 mL/min/1 73 square meters)  •  Stage 5 End Stage CKD (GFR <15 mL/min/1 73 square meters)  Note: GFR calculation is accurate only with a steady state creatinine    CBC and differential [[id number]]  (Abnormal) Collected: 05/26/23 2300    Lab Status: Final result Specimen: Blood from Arm, Left Updated: 05/26/23 2306     WBC 8 84 Thousand/uL      RBC 4 44 Million/uL      Hemoglobin 13 0 g/dL      Hematocrit 41 8 %      MCV 94 fL      MCH 29 3 pg      MCHC 31 1 g/dL      RDW 13 2 %      MPV 9 6 fL      Platelets 429 Thousands/uL      nRBC 0 /100 WBCs      Neutrophils Relative 70 %      Immat GRANS % 0 %      Lymphocytes Relative 17 %      Monocytes Relative 8 %      Eosinophils Relative 4 %      Basophils Relative 1 %      Neutrophils Absolute 6 26 Thousands/µL      Immature Grans Absolute 0 03 Thousand/uL      Lymphocytes Absolute 1 48 Thousands/µL      Monocytes Absolute 0 69 Thousand/µL      Eosinophils Absolute 0 31 Thousand/µL      Basophils Absolute 0 07 Thousands/µL                  CT head without contrast    (Results Pending)         Procedures  ECG 12 Lead Documentation Only    Date/Time: 5/26/2023 11:31 PM    Performed by: Apple Sagastume MD  Authorized by: Apple Sagastume MD    Indications / Diagnosis:  Hypertension  ECG reviewed by me, the ED Provider: yes    Patient location:  ED  Previous ECG:     Previous ECG:  Compared to current    Comparison ECG info:  March 28, 2023    Similarity:  No change    Comparison to cardiac monitor: Yes    Interpretation:     Interpretation: normal    Rate:     ECG rate:  63 bpm    ECG rate assessment: normal    Rhythm:     Rhythm: sinus rhythm    Ectopy:     Ectopy: none    QRS:     QRS axis:  Normal    QRS intervals:  Normal  Conduction:     Conduction: normal    ST segments:     ST segments:  Normal  T waves:     T waves: normal    Comments:      Ventricular rate 63 bpm, RI interval 170 ms, QRS duration 82 ms,  ms, QTc 407 ms  No acute ST segment elevation or depression  Normal sinus rhythm  Normal EKG  ED Course  ED Course as of 05/27/23 0231   Fri May 26, 2023   258 29-year-old female past medical history of hypertension tension presented for evaluation of elevated blood pressure  2259 Ordered CBC CMP EKG CT head   2326 CBC and differential(!)  Within normal limits, no white count   Sat May 27, 2023   0012 CT head without contrast  Acute intracranial abnormality seen  No bleeding  0012 Patient made aware of the CT finding  Patient was vies to follow-up with her cardiologist for better control of her blood pressure  Reports she has an appointment with her cardiology next Tuesday  Patient discharged with return precaution  MDM      Disposition  Final diagnoses:   None     ED Disposition     None      Follow-up Information    None         Patient's Medications   Discharge Prescriptions    No medications on file     No discharge procedures on file  PDMP Review     None           ED Provider  Attending physically available and evaluated Wero Doshi I managed the patient along with the ED Attending      Electronically Signed by

## 2023-05-27 NOTE — ED PROVIDER NOTES
"History  Chief Complaint   Patient presents with   • Hypertension     Pt from home, says she has HTN, took her pressure earlier and reports it was high  Took nightly labetalol + extra dose  Reports BP kept climbing to \"220/105\"  Reports headache, no vision changes  72-year-old female, past medical history of hypertension presented for evaluation for elevated blood pressure  Patient reported that earlier this evening she took her blood pressure it was 159/87  Patient reports she normally take 50 mg of labetalol in the morning and 50 mg at night  About an hour after taking her blood pressure, she started having headache and took extra dose  of her labetalol  Subsequent blood pressure measurements where in the 220's/105 even after taking the second dose of labetalol  Patient currently reports headache however she denied any vision changes, chest pain, nausea, vomiting, palpitation or weakness  Patient seen at bedside neurologically intact in no acute distress  Prior to Admission Medications   Prescriptions Last Dose Informant Patient Reported? Taking?    LORazepam (ATIVAN) 0 5 mg tablet   Yes No   Sig: Take 0 5 mg by mouth 2 (two) times a day   albuterol (Ventolin HFA) 90 mcg/act inhaler   No No   Sig: Inhale 2 puffs every 6 (six) hours as needed for wheezing   aspirin 81 mg chewable tablet  Self Yes No   Sig: Chew 81 mg daily   atorvastatin (LIPITOR) 40 mg tablet   Yes No   Sig: Take 40 mg by mouth daily   ezetimibe (ZETIA) 10 mg tablet  Self Yes No   Sig: Take 10 mg by mouth daily   ipratropium (ATROVENT) 0 03 % nasal spray   Yes No   Sig: instill 2 sprays into each nostril two to three times a day into each nostril as directed   labetalol (NORMODYNE) 100 mg tablet   Yes No   Sig: Take 100 mg by mouth 2 (two) times a day   nicotine (NICODERM CQ) 21 mg/24 hr TD 24 hr patch   No No   Sig: Place 1 patch on the skin daily   Patient not taking: Reported on 4/14/2023   ondansetron (ZOFRAN) 8 mg tablet   " No No   Sig: Take 1 tablet (8 mg total) by mouth every 8 (eight) hours as needed for nausea or vomiting   Patient not taking: Reported on 4/14/2023   predniSONE 10 mg tablet   No No   Sig: TAKE 6 TAB PO DAILY X 1 DAY, THEN 5 TAB DAILY X 1 DAY, THEN 4 TAB DAILY X 1 DAY, THEN 3 TAB DAILY X 1 DAY, THEN 2 TAB DAILY X 1 DAY, THEN 1 TAB DAILY X 1 DAY   Patient not taking: Reported on 4/14/2023      Facility-Administered Medications: None       Past Medical History:   Diagnosis Date   • Acid reflux    • Anxiety    • Arthritis    • Cancer (HCC)     skin on chin   • Cardiac disease    • Chronic kidney disease     (R) kidney smaller than (L),  kidney stones   • Chronic UTI (urinary tract infection)    • Colitis     microscopic   • COPD (chronic obstructive pulmonary disease) (HCC)     mild, recent dx   • Depression    • Epidermal cyst of face     last assessed 10-   • GERD (gastroesophageal reflux disease)    • Heart attack ([facility] [address] ) 09/20/2018   • [place] (hard of hearing)     deaf (L) ear, decreased hearing (R) ear   • Hyperlipemia     diet controlled   • Hyperlipidemia    • IBS (irritable bowel syndrome)    • Malignant neoplasm of skin    • Meniere disease    • Migraine    • Myocardial infarction ([facility] [address] ) 09/2018   • Numbness and tingling of left side of face    • Uterine leiomyoma        Past Surgical History:   Procedure Laterality Date   • CARDIAC CATHETERIZATION      9/21/18 [facility] - severe CAD of the LAD and diagonal artery   • COLONOSCOPY N/A 11/4/2016    Procedure: COLONOSCOPY;  Surgeon: Belia Anand MD;  Location: [facility] GI LAB; Service:    • ERCP     • ESOPHAGOGASTRODUODENOSCOPY N/A 11/4/2016    Procedure: ESOPHAGOGASTRODUODENOSCOPY (EGD); Surgeon: Belia Anand MD;  Location: [facility] GI LAB; Service:    • EYE SURGERY     • UT XCAPSL CTRC RMVL INSJ IO LENS PROSTH W/O ECP Left 3/7/2016    Procedure: EXTRACTION EXTRACAPSULAR CATARACT PHACO INTRAOCULAR LENS (IOL);   Surgeon: Edda Vila MD;  Location[facility];  Service: Ophthalmology   • SKIN CANCER EXCISION      excision skin cancer on chin       Family History   Problem Relation Age of Onset   • Heart disease Mother         cardiac disorder   • Lung cancer Mother    • COPD Mother    • Brain cancer Father    • Colonic polyp Sister    • Thyroid cancer Sister    • Skin cancer Brother    • Skin cancer Brother    • Diabetes Son    • Neuropathy Son    • Multiple sclerosis Son    • No Known Problems Son    • Colon cancer Maternal Grandmother    • Breast cancer Cousin 20     I have reviewed and agree with the history as documented  E-Cigarette/Vaping     E-Cigarette/Vaping Substances     Social History     Tobacco Use   • Smoking status: Every Day     Packs/day: 1 00     Years: 50 00     Total pack years: 50 00     Types: Cigarettes   • Smokeless tobacco: Never   • Tobacco comments:     45+ years per allscripts   Substance Use Topics   • Alcohol use: No   • Drug use: No        Review of Systems   Constitutional: Negative for activity change, chills and fever  HENT: Negative for ear pain and sore throat  Eyes: Negative for pain and visual disturbance  Respiratory: Negative for cough and shortness of breath  Cardiovascular: Negative for chest pain and palpitations  Gastrointestinal: Negative for abdominal pain and vomiting  Genitourinary: Negative for dysuria and hematuria  Musculoskeletal: Negative for arthralgias and back pain  Skin: Negative for color change and rash  Neurological: Positive for headaches  Negative for dizziness, seizures, syncope, speech difficulty and weakness  All other systems reviewed and are negative        Physical Exam  ED Triage Vitals   Temperature Pulse Respirations Blood Pressure SpO2   05/26/23 2207 05/26/23 2207 05/26/23 2207 05/26/23 2207 05/26/23 2207   97 8 °F (36 6 °C) 86 18 (!) 226/102 99 %      Temp Source Heart Rate Source Patient Position - Orthostatic VS BP Location FiO2 (%)   05/26/23 2207 05/26/23 2207 05/26/23 2207 05/26/23 2207 --   Oral Monitor Lying Right arm       Pain Score       05/26/23 2229       7             Orthostatic Vital Signs  Vitals:    05/26/23 2207 05/26/23 2230 05/26/23 2300   BP: (!) 226/102 (!) 175/84 157/70   Pulse: 86 75 72   Patient Position - Orthostatic VS: Lying         Physical Exam  Vitals and nursing note reviewed  Constitutional:       General: She is not in acute distress  Appearance: She is well-developed  HENT:      Head: Normocephalic and atraumatic  Eyes:      Conjunctiva/sclera: Conjunctivae normal    Cardiovascular:      Rate and Rhythm: Normal rate and regular rhythm  Heart sounds: No murmur heard  Pulmonary:      Effort: Pulmonary effort is normal  No respiratory distress  Breath sounds: Normal breath sounds  Abdominal:      Palpations: Abdomen is soft  Tenderness: There is no abdominal tenderness  Musculoskeletal:         General: No swelling  Cervical back: Neck supple  Skin:     General: Skin is warm and dry  Capillary Refill: Capillary refill takes less than 2 seconds  Neurological:      General: No focal deficit present  Mental Status: She is alert and oriented to person, place, and time  Cranial Nerves: No cranial nerve deficit  Sensory: No sensory deficit  Motor: No weakness     Psychiatric:         Mood and Affect: Mood normal          ED Medications  Medications   acetaminophen (TYLENOL) tablet 650 mg (650 mg Oral Given 5/26/23 2304)       Diagnostic Studies  Results Reviewed     Procedure Component Value Units Date/Time    Comprehensive metabolic panel [[id number]]  (Abnormal) Collected: 05/26/23 2300    Lab Status: Final result Specimen: Blood from Arm, Left Updated: 05/26/23 2329     Sodium 141 mmol/L      Potassium 4 1 mmol/L      Chloride 110 mmol/L      CO2 26 mmol/L      ANION GAP 5 mmol/L      BUN 15 mg/dL      Creatinine 0 75 mg/dL      Glucose 107 mg/dL      Calcium 9 6 mg/dL      AST 19 U/L      ALT 26 U/L      Alkaline Phosphatase 95 U/L      Total Protein 6 4 g/dL      Albumin 4 1 g/dL      Total Bilirubin 0 49 mg/dL      eGFR 83 ml/min/1 73sq m     Narrative:      [place] guidelines for Chronic Kidney Disease (CKD):   •  Stage 1 with normal or high GFR (GFR > 90 mL/min/1 73 square meters)  •  Stage 2 Mild CKD (GFR = 60-89 mL/min/1 73 square meters)  •  Stage 3A Moderate CKD (GFR = 45-59 mL/min/1 73 square meters)  •  Stage 3B Moderate CKD (GFR = 30-44 mL/min/1 73 square meters)  •  Stage 4 Severe CKD (GFR = 15-29 mL/min/1 73 square meters)  •  Stage 5 End Stage CKD (GFR <15 mL/min/1 73 square meters)  Note: GFR calculation is accurate only with a steady state creatinine    CBC and differential [[id number]]  (Abnormal) Collected: 05/26/23 2300    Lab Status: Final result Specimen: Blood from Arm, Left Updated: 05/26/23 2306     WBC 8 84 Thousand/uL      RBC 4 44 Million/uL      Hemoglobin 13 0 g/dL      Hematocrit 41 8 %      MCV 94 fL      MCH 29 3 pg      MCHC 31 1 g/dL      RDW 13 2 %      MPV 9 6 fL      Platelets 883 Thousands/uL      nRBC 0 /100 WBCs      Neutrophils Relative 70 %      Immat GRANS % 0 %      Lymphocytes Relative 17 %      Monocytes Relative 8 %      Eosinophils Relative 4 %      Basophils Relative 1 %      Neutrophils Absolute 6 26 Thousands/µL      Immature Grans Absolute 0 03 Thousand/uL      Lymphocytes Absolute 1 48 Thousands/µL      Monocytes Absolute 0 69 Thousand/µL      Eosinophils Absolute 0 31 Thousand/µL      Basophils Absolute 0 07 Thousands/µL                  CT head without contrast   Final Result by Jacob Iqbal MD (05/26 2350)      No acute intracranial abnormality                    Workstation performed: [id number]               Procedures  ECG 12 Lead Documentation Only    Date/Time: 5/26/2023 11:31 PM    Performed by: Celine Sandoval MD  Authorized by: Celine Sandoval MD    Indications / Diagnosis: Hypertension  ECG reviewed by me, the ED Provider: yes    Patient location:  ED  Previous ECG:     Previous ECG:  Compared to current    Comparison ECG info:  March 28, 2023    Similarity:  No change    Comparison to cardiac monitor: Yes    Interpretation:     Interpretation: normal    Rate:     ECG rate:  63 bpm    ECG rate assessment: normal    Rhythm:     Rhythm: sinus rhythm    Ectopy:     Ectopy: none    QRS:     QRS axis:  Normal    QRS intervals:  Normal  Conduction:     Conduction: normal    ST segments:     ST segments:  Normal  T waves:     T waves: normal    Comments:      Ventricular rate 63 bpm, VA interval 170 ms, QRS duration 82 ms,  ms, QTc 407 ms  No acute ST segment elevation or depression  Normal sinus rhythm  Normal EKG  ED Course  ED Course as of 05/27/23 0334   Fri May 26, 2023   258 77-year-old female ,past medical history of hypertension  presented for evaluation of elevated blood pressure  2259 Ordered CBC CMP EKG CT head   2326 CBC and differential(!)  Within normal limits, no white count   Sat May 27, 2023   0012 CT head without contrast  No acute intracranial abnormality seen  No bleeding  0012 Patient made aware of the CT finding  Patient was advised to follow-up with her cardiologist for better control of her blood pressure  She reports she has an appointment with her cardiology next Tuesday  Patient discharged with return precautions  Medical Decision Making  77-year-old female past medical history of hypertension  presented for evaluation of elevated blood pressure  CBC and differential(!) Within normal limits, no white count  CT head without contrast showed no acute intracranial abnormality   No bleeding  Differential diagnosis include chronic hypertension, intracranial bleed, hypertension emergency, stroke among others  Patient made aware of the CT finding    Patient was advised to follow-up with her cardiologist for better control of her blood pressure  She reports she has an appointment with her cardiology next Tuesday  Patient discharged with return precautions  Headache: acute illness or injury  High blood pressure: chronic illness or injury  Amount and/or Complexity of Data Reviewed  External Data Reviewed: labs  Labs: ordered  Decision-making details documented in ED Course  Radiology: ordered  Decision-making details documented in ED Course  Risk  OTC drugs  Disposition  Final diagnoses:   High blood pressure   Headache     Time reflects when diagnosis was documented in both MDM as applicable and the Disposition within this note     Time User Action Codes Description Comment    5/27/2023 12:14 AM Sherif Caballero Add [I10] High blood pressure     5/27/2023 12:14 AM Janelle Caballero Add [R51 9] Headache       ED Disposition     ED Disposition   Discharge    Condition   Stable    Date/Time   Sat May 27, 2023 12:18 AM    Comment   Christianne Aranda discharge to home/self care                 Follow-up Information     Follow up With Specialties Details Why [address], [facility] [address]   [id number] [place] [address] Drive,[address]  [address]  [phone]            Discharge Medication List as of 5/27/2023 12:18 AM      CONTINUE these medications which have NOT CHANGED    Details   albuterol (Ventolin HFA) 90 mcg/act inhaler Inhale 2 puffs every 6 (six) hours as needed for wheezing, Starting Wed 3/15/2023, Normal      aspirin 81 mg chewable tablet Chew 81 mg daily, Historical Med      atorvastatin (LIPITOR) 40 mg tablet Take 40 mg by mouth daily, Starting Fri 12/23/2022, Historical Med      ezetimibe (ZETIA) 10 mg tablet Take 10 mg by mouth daily, Historical Med      ipratropium (ATROVENT) 0 03 % nasal spray instill 2 sprays into each nostril two to three times a day into each nostril as directed, Historical Med      labetalol (NORMODYNE) 100 mg tablet Take 100 mg by mouth 2 (two) times a day, Starting Tue 3/28/2023, Historical Med      LORazepam (ATIVAN) 0 5 mg tablet Take 0 5 mg by mouth 2 (two) times a day, Starting Tue 3/28/2023, Historical Med      nicotine (NICODERM CQ) 21 mg/24 hr TD 24 hr patch Place 1 patch on the skin daily, Starting Sat 9/17/2022, Normal      ondansetron (ZOFRAN) 8 mg tablet Take 1 tablet (8 mg total) by mouth every 8 (eight) hours as needed for nausea or vomiting, Starting Wed 3/15/2023, Normal      predniSONE 10 mg tablet TAKE 6 TAB PO DAILY X 1 DAY, THEN 5 TAB DAILY X 1 DAY, THEN 4 TAB DAILY X 1 DAY, THEN 3 TAB DAILY X 1 DAY, THEN 2 TAB DAILY X 1 DAY, THEN 1 TAB DAILY X 1 DAY, Normal           No discharge procedures on file  PDMP Review     None           ED Provider  Attending physically available and evaluated [place]  I managed the patient along with the ED Attending      Electronically Signed by         Yudith Metzger MD  05/27/23 0020

## 2023-05-29 LAB
ATRIAL RATE: 63 BPM
P AXIS: 68 DEGREES
PR INTERVAL: 170 MS
QRS AXIS: 56 DEGREES
QRSD INTERVAL: 82 MS
QT INTERVAL: 398 MS
QTC INTERVAL: 407 MS
T WAVE AXIS: 50 DEGREES
VENTRICULAR RATE: 63 BPM

## 2023-05-30 ENCOUNTER — VBI (OUTPATIENT)
Dept: ADMINISTRATIVE | Facility: OTHER | Age: 68
End: 2023-05-30

## 2023-05-30 NOTE — TELEPHONE ENCOUNTER
Yared Valdez    ED Visit Information     Ed visit date: 5/26/23  Diagnosis Description: hypertension  In Network? Yes [facility]  Discharge status: Home  Discharged with meds ? No  Number of ED visits to date: 2  ED Severity:3     Outreach Information    Outreach successful: Yes 2  Date letter mailed:n/a  Date Finalized:5/31/23    Care Coordination    Follow up appointment with specialilty: yes saw cardio on 5/30/23   Transportation issues ? No    [facility] [employer] type: [address] [facility] PCP: Yes  Transportation: Friend/Family Transport  Called PCP first?: No  Feels able to call PCP for urgent problems ?: Yes  Understands what emergencies can be handled by PCP ?: Yes  Ever any problems getting appointment with PCP for minor emergency/urgency problems?: No  Practice Contacted Patient ?: No  Pt had ED follow up with pcp/staff ?: No    Seen for follow-up out of network ?: No  Reason Patient went to ED instead of Urgent Care or PCP?: Perceived Severity of Illness  05/30/2023 09:49 AM EDT by Sonia Ness 05/30/2023 09:49 AM EDT by Sonia TANG [employer] [facility] (Self) [phone] (VSYMNQ)[phone] (Mobile)  [phone] (Mobile) Remove  - Left Message    05/31/2023 09:57 AM EDT by Sonia Ness 05/31/2023 09:57 AM EDT by Sonia SANTOSProtAb Days (Self) [phone] (FVQVST)[phone] (Mobile)  [phone] (Mobile) Remove  - Call Complete    Patient states she had already gotten a call from hospital for follow up, no note seen in chart  Patient states she is doing fine, she saw cardiologist  She was able to answer all questions

## 2023-08-17 ENCOUNTER — HOSPITAL ENCOUNTER (OUTPATIENT)
Dept: CT IMAGING | Facility: HOSPITAL | Age: 68
Discharge: HOME | End: 2023-08-17
Attending: HOSPITALIST
Payer: COMMERCIAL

## 2023-08-17 DIAGNOSIS — J44.9 CHRONIC OBSTRUCTIVE PULMONARY DISEASE, UNSPECIFIED COPD TYPE (HCC): ICD-10-CM

## 2023-08-17 PROCEDURE — 71250 CT THORAX DX C-: CPT

## 2023-08-17 PROCEDURE — G1004 CDSM NDSC: HCPCS

## 2023-08-28 ENCOUNTER — TELEPHONE (OUTPATIENT)
Dept: PULMONOLOGY | Facility: MEDICAL CENTER | Age: 68
End: 2023-08-28

## 2023-08-31 NOTE — TELEPHONE ENCOUNTER
Patient called the office asking if the doctor can give her a call regarding her CT results. Please advise.

## 2023-09-01 DIAGNOSIS — R91.8 LUNG NODULES: Primary | ICD-10-CM

## 2023-09-01 NOTE — PROGRESS NOTES
I did speak to Sommer Lopez. He is fine to call back our office this afternoon. I did describe CT findings with her and she was agreeable to have follow-up CT scan of chest in 6 months or approximately mid February or early March of next year. She will call after that for report.

## 2023-10-12 ENCOUNTER — APPOINTMENT (EMERGENCY)
Dept: RADIOLOGY | Facility: HOSPITAL | Age: 68
End: 2023-10-12
Payer: COMMERCIAL

## 2023-10-12 ENCOUNTER — HOSPITAL ENCOUNTER (EMERGENCY)
Facility: HOSPITAL | Age: 68
Discharge: HOME | End: 2023-10-12
Attending: EMERGENCY MEDICINE
Payer: COMMERCIAL

## 2023-10-12 ENCOUNTER — APPOINTMENT (EMERGENCY)
Dept: ULTRASOUND IMAGING | Facility: HOSPITAL | Age: 68
End: 2023-10-12
Payer: COMMERCIAL

## 2023-10-12 VITALS
HEIGHT: 62 IN | TEMPERATURE: 97.9 F | BODY MASS INDEX: 31.85 KG/M2 | WEIGHT: 173.06 LBS | HEART RATE: 72 BPM | RESPIRATION RATE: 20 BRPM | DIASTOLIC BLOOD PRESSURE: 58 MMHG | OXYGEN SATURATION: 98 % | SYSTOLIC BLOOD PRESSURE: 115 MMHG

## 2023-10-12 DIAGNOSIS — R42 LIGHTHEADED: Primary | ICD-10-CM

## 2023-10-12 DIAGNOSIS — R11.0 NAUSEA: ICD-10-CM

## 2023-10-12 DIAGNOSIS — K80.20 CHOLELITHIASIS: ICD-10-CM

## 2023-10-12 LAB
ALBUMIN SERPL BCP-MCNC: 4.4 G/DL (ref 3.5–5)
ALP SERPL-CCNC: 76 U/L (ref 34–104)
ALT SERPL W P-5'-P-CCNC: 20 U/L (ref 7–52)
ANION GAP SERPL CALCULATED.3IONS-SCNC: 8 MMOL/L
AST SERPL W P-5'-P-CCNC: 18 U/L (ref 13–39)
ATRIAL RATE: 90 BPM
BASOPHILS # BLD AUTO: 0.06 THOUSANDS/ÂΜL (ref 0–0.1)
BASOPHILS NFR BLD AUTO: 1 % (ref 0–1)
BILIRUB DIRECT SERPL-MCNC: 0.07 MG/DL (ref 0–0.2)
BILIRUB SERPL-MCNC: 0.57 MG/DL (ref 0.2–1)
BUN SERPL-MCNC: 15 MG/DL (ref 5–25)
CALCIUM SERPL-MCNC: 9.7 MG/DL (ref 8.4–10.2)
CARDIAC TROPONIN I PNL SERPL HS: 2 NG/L
CHLORIDE SERPL-SCNC: 106 MMOL/L (ref 96–108)
CO2 SERPL-SCNC: 26 MMOL/L (ref 21–32)
CREAT SERPL-MCNC: 0.86 MG/DL (ref 0.6–1.3)
EOSINOPHIL # BLD AUTO: 0.26 THOUSAND/ÂΜL (ref 0–0.61)
EOSINOPHIL NFR BLD AUTO: 3 % (ref 0–6)
ERYTHROCYTE [DISTWIDTH] IN BLOOD BY AUTOMATED COUNT: 12.7 % (ref 11.6–15.1)
GFR SERPL CREATININE-BSD FRML MDRD: 70 ML/MIN/1.73SQ M
GLUCOSE SERPL-MCNC: 139 MG/DL (ref 65–140)
HCT VFR BLD AUTO: 41.1 % (ref 34.8–46.1)
HGB BLD-MCNC: 13.8 G/DL (ref 11.5–15.4)
IMM GRANULOCYTES # BLD AUTO: 0.03 THOUSAND/UL (ref 0–0.2)
IMM GRANULOCYTES NFR BLD AUTO: 0 % (ref 0–2)
LIPASE SERPL-CCNC: 89 U/L (ref 11–82)
LYMPHOCYTES # BLD AUTO: 1.33 THOUSANDS/ÂΜL (ref 0.6–4.47)
LYMPHOCYTES NFR BLD AUTO: 18 % (ref 14–44)
MCH RBC QN AUTO: 29.9 PG (ref 26.8–34.3)
MCHC RBC AUTO-ENTMCNC: 33.6 G/DL (ref 31.4–37.4)
MCV RBC AUTO: 89 FL (ref 82–98)
MONOCYTES # BLD AUTO: 0.57 THOUSAND/ÂΜL (ref 0.17–1.22)
MONOCYTES NFR BLD AUTO: 8 % (ref 4–12)
NEUTROPHILS # BLD AUTO: 5.31 THOUSANDS/ÂΜL (ref 1.85–7.62)
NEUTS SEG NFR BLD AUTO: 70 % (ref 43–75)
NRBC BLD AUTO-RTO: 0 /100 WBCS
P AXIS: 68 DEGREES
PLATELET # BLD AUTO: 243 THOUSANDS/UL (ref 149–390)
PMV BLD AUTO: 9.2 FL (ref 8.9–12.7)
POTASSIUM SERPL-SCNC: 3.8 MMOL/L (ref 3.5–5.3)
PR INTERVAL: 144 MS
PROT SERPL-MCNC: 6.8 G/DL (ref 6.4–8.4)
QRS AXIS: 40 DEGREES
QRSD INTERVAL: 76 MS
QT INTERVAL: 350 MS
QTC INTERVAL: 428 MS
RBC # BLD AUTO: 4.61 MILLION/UL (ref 3.81–5.12)
SODIUM SERPL-SCNC: 140 MMOL/L (ref 135–147)
T WAVE AXIS: 5 DEGREES
VENTRICULAR RATE: 90 BPM
WBC # BLD AUTO: 7.56 THOUSAND/UL (ref 4.31–10.16)

## 2023-10-12 PROCEDURE — 85025 COMPLETE CBC W/AUTO DIFF WBC: CPT | Performed by: EMERGENCY MEDICINE

## 2023-10-12 PROCEDURE — 99284 EMERGENCY DEPT VISIT MOD MDM: CPT

## 2023-10-12 PROCEDURE — 83690 ASSAY OF LIPASE: CPT | Performed by: EMERGENCY MEDICINE

## 2023-10-12 PROCEDURE — 76705 ECHO EXAM OF ABDOMEN: CPT

## 2023-10-12 PROCEDURE — 80076 HEPATIC FUNCTION PANEL: CPT | Performed by: EMERGENCY MEDICINE

## 2023-10-12 PROCEDURE — 93005 ELECTROCARDIOGRAM TRACING: CPT

## 2023-10-12 PROCEDURE — 71045 X-RAY EXAM CHEST 1 VIEW: CPT

## 2023-10-12 PROCEDURE — 36415 COLL VENOUS BLD VENIPUNCTURE: CPT | Performed by: EMERGENCY MEDICINE

## 2023-10-12 PROCEDURE — 84484 ASSAY OF TROPONIN QUANT: CPT | Performed by: EMERGENCY MEDICINE

## 2023-10-12 PROCEDURE — 96374 THER/PROPH/DIAG INJ IV PUSH: CPT

## 2023-10-12 PROCEDURE — 99285 EMERGENCY DEPT VISIT HI MDM: CPT | Performed by: EMERGENCY MEDICINE

## 2023-10-12 PROCEDURE — 93010 ELECTROCARDIOGRAM REPORT: CPT | Performed by: INTERNAL MEDICINE

## 2023-10-12 PROCEDURE — 96375 TX/PRO/DX INJ NEW DRUG ADDON: CPT

## 2023-10-12 PROCEDURE — 80048 BASIC METABOLIC PNL TOTAL CA: CPT | Performed by: EMERGENCY MEDICINE

## 2023-10-12 RX ORDER — ONDANSETRON 2 MG/ML
4 INJECTION INTRAMUSCULAR; INTRAVENOUS ONCE
Status: COMPLETED | OUTPATIENT
Start: 2023-10-12 | End: 2023-10-12

## 2023-10-12 RX ORDER — ONDANSETRON 4 MG/1
4 TABLET, ORALLY DISINTEGRATING ORAL EVERY 6 HOURS PRN
Qty: 20 TABLET | Refills: 0 | Status: SHIPPED | OUTPATIENT
Start: 2023-10-12 | End: 2023-10-18

## 2023-10-12 RX ORDER — LORAZEPAM 2 MG/ML
0.5 INJECTION INTRAMUSCULAR ONCE
Status: COMPLETED | OUTPATIENT
Start: 2023-10-12 | End: 2023-10-12

## 2023-10-12 RX ADMIN — ONDANSETRON 4 MG: 2 INJECTION INTRAMUSCULAR; INTRAVENOUS at 12:21

## 2023-10-12 RX ADMIN — LORAZEPAM 0.5 MG: 2 INJECTION INTRAMUSCULAR; INTRAVENOUS at 12:21

## 2023-10-12 NOTE — ED PROVIDER NOTES
History  Chief Complaint   Patient presents with    Dizziness     Dizziness, Nausea, starting after leaving restaurant today, pt reports abnormally high bp     17-year-old female history of ACS, COPD, tobacco abuse, colitis, vertigo presents with lightheadedness, nausea. Patient states that she left a restaurant after eating. On the way out she felt lightheaded like she was going to pass out. Felt fatigue in her legs. Onset of nausea without emesis appears. Felt like she needed to have a bowel movement. Ran inside. Had a slightly loose bowel movement. No bloody or black stool. Has not passed out. Taking her blood pressure medication as prescribed. Dizziness  Quality:  Lightheadedness  Severity:  Moderate  Onset quality:  Sudden  Timing:  Constant  Progression:  Partially resolved  Chronicity:  New  Relieved by:  Nothing  Worsened by:  Nothing  Ineffective treatments:  None tried      Prior to Admission Medications   Prescriptions Last Dose Informant Patient Reported? Taking?    LORazepam (ATIVAN) 0.5 mg tablet   Yes No   Sig: Take 0.5 mg by mouth 2 (two) times a day   albuterol (Ventolin HFA) 90 mcg/act inhaler   No No   Sig: Inhale 2 puffs every 6 (six) hours as needed for wheezing   aspirin 81 mg chewable tablet  Self Yes No   Sig: Chew 81 mg daily   atorvastatin (LIPITOR) 40 mg tablet   Yes No   Sig: Take 40 mg by mouth daily   ezetimibe (ZETIA) 10 mg tablet  Self Yes No   Sig: Take 10 mg by mouth daily   ipratropium (ATROVENT) 0.03 % nasal spray   Yes No   Sig: instill 2 sprays into each nostril two to three times a day into each nostril as directed   labetalol (NORMODYNE) 100 mg tablet   Yes No   Sig: Take 100 mg by mouth 2 (two) times a day   nicotine (NICODERM CQ) 21 mg/24 hr TD 24 hr patch   No No   Sig: Place 1 patch on the skin daily   Patient not taking: Reported on 4/14/2023   ondansetron (ZOFRAN) 8 mg tablet   No No   Sig: Take 1 tablet (8 mg total) by mouth every 8 (eight) hours as needed for nausea or vomiting   Patient not taking: Reported on 4/14/2023   predniSONE 10 mg tablet   No No   Sig: TAKE 6 TAB PO DAILY X 1 DAY, THEN 5 TAB DAILY X 1 DAY, THEN 4 TAB DAILY X 1 DAY, THEN 3 TAB DAILY X 1 DAY, THEN 2 TAB DAILY X 1 DAY, THEN 1 TAB DAILY X 1 DAY   Patient not taking: Reported on 4/14/2023      Facility-Administered Medications: None       Past Medical History:   Diagnosis Date    Acid reflux     Anxiety     Arthritis     Cancer (HCC)     skin on chin    Cardiac disease     Chronic kidney disease     (R) kidney smaller than (L),  kidney stones    Chronic UTI (urinary tract infection)     Colitis     microscopic    COPD (chronic obstructive pulmonary disease) (HCC)     mild, recent dx    Depression     Epidermal cyst of face     last assessed 10-    GERD (gastroesophageal reflux disease)     Heart attack ([address]) 09/20/2018    [place] (hard of hearing)     deaf (L) ear, decreased hearing (R) ear    Hyperlipemia     diet controlled    Hyperlipidemia     IBS (irritable bowel syndrome)     Malignant neoplasm of skin     Meniere disease     Migraine     Myocardial infarction ([address]) 09/2018    Numbness and tingling of left side of face     Uterine leiomyoma        Past Surgical History:   Procedure Laterality Date    CARDIAC CATHETERIZATION      9/21/18 [facility] - severe CAD of the LAD and diagonal artery    COLONOSCOPY N/A 11/4/2016    Procedure: COLONOSCOPY;  Surgeon: Florida Alcantar MD;  Location: [address] [place] [address] GI LAB; Service:     ERCP      ESOPHAGOGASTRODUODENOSCOPY N/A 11/4/2016    Procedure: ESOPHAGOGASTRODUODENOSCOPY (EGD); Surgeon: Florida Alcantar MD;  Location: [facility] GI LAB; Service:     EYE SURGERY      PA XCAPSL CTRC RMVL INSJ IO LENS PROSTH W/O ECP Left 3/7/2016    Procedure: EXTRACTION EXTRACAPSULAR CATARACT PHACO INTRAOCULAR LENS (IOL);   Surgeon: Gabbie Gray MD;  Location: [facility] MAIN OR;  Service: Ophthalmology    SKIN CANCER EXCISION      excision skin cancer on chin Family History   Problem Relation Age of Onset    Heart disease Mother         cardiac disorder    Lung cancer Mother     COPD Mother     Brain cancer Father     Colonic polyp Sister     Thyroid cancer Sister     Skin cancer Brother     Skin cancer Brother     Diabetes Son     Neuropathy Son     Multiple sclerosis Son     No Known Problems Son     Colon cancer Maternal Grandmother     Breast cancer Cousin 21     I have reviewed and agree with the history as documented. E-Cigarette/Vaping     E-Cigarette/Vaping Substances     Social History     Tobacco Use    Smoking status: Every Day     Packs/day: 1.00     Years: 50.00     Total pack years: 50.00     Types: Cigarettes    Smokeless tobacco: Never    Tobacco comments:     45+ years per allscripts   Substance Use Topics    Alcohol use: No    Drug use: No       Review of Systems   Neurological:  Positive for light-headedness. All other systems reviewed and are negative. Physical Exam  Physical Exam  Vitals and nursing note reviewed. Constitutional:       General: She is not in acute distress. Appearance: Normal appearance. She is not ill-appearing. HENT:      Head: Normocephalic and atraumatic. Right Ear: External ear normal.      Left Ear: External ear normal.      Nose: Nose normal.      Mouth/Throat:      Mouth: Mucous membranes are moist.   Eyes:      General:         Right eye: No discharge. Left eye: No discharge. Conjunctiva/sclera: Conjunctivae normal.   Cardiovascular:      Rate and Rhythm: Normal rate and regular rhythm. Pulses: Normal pulses. Heart sounds: No murmur heard. Pulmonary:      Effort: Pulmonary effort is normal.      Breath sounds: Normal breath sounds. Abdominal:      General: Abdomen is flat. There is no distension. Tenderness: There is abdominal tenderness. There is no guarding or rebound. Comments: Mild epigastric and right upper quadrant pain. Negative [place]. Musculoskeletal:         General: Normal range of motion. Cervical back: Normal range of motion. Skin:     General: Skin is warm. Capillary Refill: Capillary refill takes less than 2 seconds. Findings: No rash. Neurological:      General: No focal deficit present. Mental Status: She is alert. Mental status is at baseline. Cranial Nerves: No cranial nerve deficit. Sensory: No sensory deficit. Motor: No weakness.       Gait: Gait normal.   Psychiatric:         Mood and Affect: Mood normal.         Behavior: Behavior normal.         Vital Signs  ED Triage Vitals [10/12/23 1206]   Temperature Pulse Respirations Blood Pressure SpO2   97.9 °F (36.6 °C) 94 18 (!) 203/85 98 %      Temp Source Heart Rate Source Patient Position - Orthostatic VS BP Location FiO2 (%)   Oral Monitor Sitting Left arm --      Pain Score       2           Vitals:    10/12/23 1206 10/12/23 1230 10/12/23 1400   BP: (!) 203/85 126/61 115/58   Pulse: 94 85 72   Patient Position - Orthostatic VS: Sitting           Visual Acuity      ED Medications  Medications   LORazepam (ATIVAN) injection 0.5 mg (0.5 mg Intravenous Given 10/12/23 1221)   ondansetron (ZOFRAN) injection 4 mg (4 mg Intravenous Given 10/12/23 1221)       Diagnostic Studies  Results Reviewed       Procedure Component Value Units Date/Time    HS Troponin I 4hr [[id number]]     Lab Status: No result Specimen: Blood     HS Troponin 0hr (reflex protocol) [[id number]]  (Normal) Collected: 10/12/23 1216    Lab Status: Final result Specimen: Blood from Arm, Right Updated: 10/12/23 1249     hs TnI 0hr 2 ng/L     HS Troponin I 2hr [[id number]]     Lab Status: No result Specimen: Blood     Basic metabolic panel [[id number]] Collected: 10/12/23 1216    Lab Status: Final result Specimen: Blood from Arm, Right Updated: 10/12/23 1241     Sodium 140 mmol/L      Potassium 3.8 mmol/L      Chloride 106 mmol/L      CO2 26 mmol/L      ANION GAP 8 mmol/L      BUN 15 mg/dL Creatinine 0.86 mg/dL      Glucose 139 mg/dL      Calcium 9.7 mg/dL      eGFR 70 ml/min/1.73sq m     Narrative:      National Kidney Disease Foundation guidelines for Chronic Kidney Disease (CKD):     Stage 1 with normal or high GFR (GFR > 90 mL/min/1.73 square meters)    Stage 2 Mild CKD (GFR = 60-89 mL/min/1.73 square meters)    Stage 3A Moderate CKD (GFR = 45-59 mL/min/1.73 square meters)    Stage 3B Moderate CKD (GFR = 30-44 mL/min/1.73 square meters)    Stage 4 Severe CKD (GFR = 15-29 mL/min/1.73 square meters)    Stage 5 End Stage CKD (GFR <15 mL/min/1.73 square meters)  Note: GFR calculation is accurate only with a steady state creatinine    Hepatic function panel [[id number]]  (Normal) Collected: 10/12/23 1216    Lab Status: Final result Specimen: Blood from Arm, Right Updated: 10/12/23 1241     Total Bilirubin 0.57 mg/dL      Bilirubin, Direct 0.07 mg/dL      Alkaline Phosphatase 76 U/L      AST 18 U/L      ALT 20 U/L      Total Protein 6.8 g/dL      Albumin 4.4 g/dL     Lipase [[id number]]  (Abnormal) Collected: 10/12/23 1216    Lab Status: Final result Specimen: Blood from Arm, Right Updated: 10/12/23 1241     Lipase 89 u/L     CBC and differential [[id number]] Collected: 10/12/23 1216    Lab Status: Final result Specimen: Blood from Arm, Right Updated: 10/12/23 1221     WBC 7.56 Thousand/uL      RBC 4.61 Million/uL      Hemoglobin 13.8 g/dL      Hematocrit 41.1 %      MCV 89 fL      MCH 29.9 pg      MCHC 33.6 g/dL      RDW 12.7 %      MPV 9.2 fL      Platelets 486 Thousands/uL      nRBC 0 /100 WBCs      Neutrophils Relative 70 %      Immat GRANS % 0 %      Lymphocytes Relative 18 %      Monocytes Relative 8 %      Eosinophils Relative 3 %      Basophils Relative 1 %      Neutrophils Absolute 5.31 Thousands/µL      Immature Grans Absolute 0.03 Thousand/uL      Lymphocytes Absolute 1.33 Thousands/µL      Monocytes Absolute 0.57 Thousand/µL      Eosinophils Absolute 0.26 Thousand/µL      Basophils Absolute 0.06 Thousands/µL                    US right upper quadrant   Final Result by Sierra Martinez MD (10/12 1358)      Cholelithiasis without evidence of cholecystitis. Workstation performed: [id number]         XR chest 1 view portable   ED Interpretation by Zulay Page DO (10/12 1308)   No acute cardiopulmonary findings. Final Result by Gigi Aragon MD (10/12 1342)      No acute cardiopulmonary disease. Right lower lobe lung nodule seen on prior CT chest.               Resident: Fredy Harris, the attending radiologist, have reviewed the images and agree with the final report above. Workstation performed: [id number]                    Procedures  Procedures         ED Course  ED Course as of 10/12/23 1419   Thu Oct 12, 2023   1338 Procedure Note: EKG  Date/Time: 10/12/23 1:39 PM   Interpreted by: Villa Colvin  Indications / Diagnosis: Dizziness  ECG reviewed by me, the ED Provider: yes   The EKG demonstrates:  Rhythm: normal sinus  Intervals: normal intervals  Axis: normal axis  QRS/Blocks: normal QRS  ST Changes: o acute ST Changes, no STD/VASYL. Bimodal T wave in lead III. SBIRT 20yo+      Flowsheet Row Most Recent Value   Initial Alcohol Screen: US AUDIT-C     1. How often do you have a drink containing alcohol? 0 Filed at: 10/12/2023 1226   2. How many drinks containing alcohol do you have on a typical day you are drinking? 0 Filed at: 10/12/2023 1226   3b. FEMALE Any Age, or MALE 65+: How often do you have 4 or more drinks on one occassion? 0 Filed at: 10/12/2023 1226   Audit-C Score 0 Filed at: 10/12/2023 1226   MON[place]: How many times in the past year have you. .. Used an illegal drug or used a prescription medication for non-medical reasons? Never Filed at: 10/12/2023 1226                      Medical Decision Making  Patient presenting with lightheadedness. Able to ambulate in the emergency department.   Will evaluate for arrhythmia, electrolyte abnormality, acute intrathoracic abnormality, less likely atypical presentation of ACS. Troponin is normal.  ECG without signs of ischemia or arrhythmia. No anemia. No electrolyte abnormality. Patient had mild epigastric and right upper quadrant pain. Will evaluate for cholecystitis, cholelithiasis. No lower abdominal pain. No pain out of proportion. Patient has a mild elevation of lipase. Not 3 times normal.  Not indicative of pancreatitis. No signs of ascending cholangitis. Normal bilirubin. Normal LFTs. Patient has cholelithiasis without signs of cholecystitis. Will Discharge home. Return precautions given. Problems Addressed:  Cholelithiasis: chronic illness or injury  Lightheaded: acute illness or injury  Nausea: acute illness or injury    Amount and/or Complexity of Data Reviewed  Labs: ordered. Radiology: ordered and independent interpretation performed. Risk  Prescription drug management. Disposition  Final diagnoses:   Lightheaded   Nausea   Cholelithiasis     Time reflects when diagnosis was documented in both MDM as applicable and the Disposition within this note       Time User Action Codes Description Comment    10/12/2023  2:01 PM Shaune Maxim Add [R42] Lightheaded     10/12/2023  2:01 PM Shaune Maxim Add [R11.0] Nausea     10/12/2023  2:02 PM Shaune Maxim Add [K80.20] Cholelithiasis           ED Disposition       ED Disposition   Discharge    Condition   Stable    Date/Time   Th Oct 12, 2023 Angie Aranda discharge to home/self care.                    Follow-up Information       Follow up With Specialties Details Why Contact Info Additional Information    Sally Butler DO Family Medicine Schedule an appointment as soon as possible for a visit  For re-evaluation as soon as possible [address] [address] [place] [facility] Emergency Medicine If symptoms worsen [address] Emergency Department, [address] [place] [address]  18[address] [facility] Surgery Schedule an appointment as soon as possible for a visit  if episodes of abd pain after eating [address] [address]  [address] [facility] [address] [place], [place], [address], [phone]            Discharge Medication List as of 10/12/2023  2:16 PM        START taking these medications    Details   ondansetron (Zofran ODT) 4 mg disintegrating tablet Take 1 tablet (4 mg total) by mouth every 6 (six) hours as needed for nausea or vomiting, Starting Thu 10/12/2023, Normal           CONTINUE these medications which have NOT CHANGED    Details   albuterol (Ventolin HFA) 90 mcg/act inhaler Inhale 2 puffs every 6 (six) hours as needed for wheezing, Starting Wed 3/15/2023, Normal      aspirin 81 mg chewable tablet Chew 81 mg daily, Historical Med      atorvastatin (LIPITOR) 40 mg tablet Take 40 mg by mouth daily, Starting Fri 12/23/2022, Historical Med      ezetimibe (ZETIA) 10 mg tablet Take 10 mg by mouth daily, Historical Med      ipratropium (ATROVENT) 0.03 % nasal spray instill 2 sprays into each nostril two to three times a day into each nostril as directed, Historical Med      labetalol (NORMODYNE) 100 mg tablet Take 100 mg by mouth 2 (two) times a day, Starting Tue 3/28/2023, Historical Med      LORazepam (ATIVAN) 0.5 mg tablet Take 0.5 mg by mouth 2 (two) times a day, Starting Tue 3/28/2023, Historical Med      nicotine (NICODERM CQ) 21 mg/24 hr TD 24 hr patch Place 1 patch on the skin daily, Starting Sat 9/17/2022, Normal      predniSONE 10 mg tablet TAKE 6 TAB PO DAILY X 1 DAY, THEN 5 TAB DAILY X 1 DAY, THEN 4 TAB DAILY X 1 DAY, THEN 3 TAB DAILY X 1 DAY, THEN 2 TAB DAILY X 1 DAY, THEN 1 TAB DAILY X 1 DAY, Normal           STOP taking these medications       ondansetron (ZOFRAN) 8 mg tablet Comments:   Reason for Stopping:               No discharge procedures on file.     PDMP Review       None            ED Provider  Electronically Signed by             Benja Cervantes DO  10/12/23 4520

## 2023-10-12 NOTE — DISCHARGE INSTRUCTIONS
Blood work today showed no significant abnormalities. Ultrasound showed gallstones without signs of infection of your gallbladder. Follow-up with general surgery if you have episodes of pain after eating fatty foods. Come back immediately if you have new or worsening symptoms such as increasing abdominal pain, fevers.

## 2023-10-13 ENCOUNTER — VBI (OUTPATIENT)
Dept: FAMILY MEDICINE CLINIC | Facility: CLINIC | Age: 68
End: 2023-10-13

## 2023-10-13 NOTE — TELEPHONE ENCOUNTER
10/13/23 2:39 PM    Patient contacted post ED visit, initial outreach attempt made but message could not be left. Additional outreach attempt will be made. Thank you.   Curtis Hein  [facility]

## 2023-10-16 NOTE — TELEPHONE ENCOUNTER
10/16/23 10:05 AM    Patient contacted post ED visit, [facility] department spoke with patient/caregiver and outreach was successful. Thank you.   Temi Parrish  [facility]

## 2023-10-16 NOTE — TELEPHONE ENCOUNTER
10/16/23 10:02 AM    Patient contacted post ED visit, second outreach attempt made. Message was left for patient to return a call to the [facility] Department at [facility]: Phone [phone]. Thank you.   Jannie Fleming  [facility]

## 2023-10-18 ENCOUNTER — HOSPITAL ENCOUNTER (EMERGENCY)
Facility: HOSPITAL | Age: 68
Discharge: HOME | End: 2023-10-18
Attending: EMERGENCY MEDICINE
Payer: COMMERCIAL

## 2023-10-18 VITALS
HEART RATE: 62 BPM | SYSTOLIC BLOOD PRESSURE: 194 MMHG | BODY MASS INDEX: 31.65 KG/M2 | DIASTOLIC BLOOD PRESSURE: 77 MMHG | OXYGEN SATURATION: 98 % | HEIGHT: 62 IN | RESPIRATION RATE: 18 BRPM | TEMPERATURE: 98.3 F

## 2023-10-18 DIAGNOSIS — I10 HYPERTENSION: Primary | ICD-10-CM

## 2023-10-18 DIAGNOSIS — R11.0 NAUSEA: ICD-10-CM

## 2023-10-18 DIAGNOSIS — F41.9 ANXIOUSNESS: ICD-10-CM

## 2023-10-18 DIAGNOSIS — R42 LIGHTHEADED: ICD-10-CM

## 2023-10-18 PROCEDURE — 99283 EMERGENCY DEPT VISIT LOW MDM: CPT

## 2023-10-18 PROCEDURE — 99284 EMERGENCY DEPT VISIT MOD MDM: CPT | Performed by: EMERGENCY MEDICINE

## 2023-10-18 RX ORDER — HYDROXYZINE HYDROCHLORIDE 25 MG/1
50 TABLET, FILM COATED ORAL EVERY 6 HOURS PRN
Qty: 40 TABLET | Refills: 0 | Status: SHIPPED | OUTPATIENT
Start: 2023-10-18

## 2023-10-18 RX ORDER — ONDANSETRON 4 MG/1
4 TABLET, ORALLY DISINTEGRATING ORAL EVERY 6 HOURS PRN
Qty: 20 TABLET | Refills: 0 | Status: SHIPPED | OUTPATIENT
Start: 2023-10-18

## 2023-10-18 RX ORDER — ONDANSETRON 4 MG/1
4 TABLET, ORALLY DISINTEGRATING ORAL ONCE
Status: COMPLETED | OUTPATIENT
Start: 2023-10-18 | End: 2023-10-18

## 2023-10-18 RX ORDER — HYDROXYZINE HYDROCHLORIDE 25 MG/1
25 TABLET, FILM COATED ORAL ONCE
Status: COMPLETED | OUTPATIENT
Start: 2023-10-18 | End: 2023-10-18

## 2023-10-18 RX ADMIN — HYDROXYZINE HYDROCHLORIDE 25 MG: 25 TABLET ORAL at 11:49

## 2023-10-18 RX ADMIN — ONDANSETRON 4 MG: 4 TABLET, ORALLY DISINTEGRATING ORAL at 11:55

## 2023-10-18 NOTE — ED PROVIDER NOTES
History  Chief Complaint   Patient presents with    Hypertension     Pt presents to ed via walk in with her BP being elevated at home states she feel "flustered" lightheaded and dizzy      70-year-old female previous history of hypertension, COPD, tobacco abuse, hyperlipidemia, MI presenting to the emergency department with elevated blood pressure. Patient evaluated for the same recently. Patient notes that she has felt lightheaded today. She denies spinning feeling. Denies neurological deficits. Notes that this morning she felt slightly nauseous. Denies abdominal pain currently. Patient declines all blood work today. I recommended that we check her heart as she has significant cardiac history but she is declining work-up at this point. She denies chest pain today but notes that she had some indigestion yesterday. Again recommended blood work which the patient declines. States that she follow-up with her cardiologist.    Your high blood pressure medication as prescribed      Hypertension  Severity:  Moderate  Onset quality:  Gradual  Timing:  Constant  Progression:  Unchanged  Chronicity:  New  Associated symptoms: nausea        Prior to Admission Medications   Prescriptions Last Dose Informant Patient Reported? Taking?    LORazepam (ATIVAN) 0.5 mg tablet Past Week  Yes Yes   Sig: Take 0.5 mg by mouth 2 (two) times a day   aspirin 81 mg chewable tablet  Self Yes No   Sig: Chew 81 mg daily   labetalol (NORMODYNE) 100 mg tablet 10/18/2023  Yes Yes   Sig: Take 100 mg by mouth 2 (two) times a day   nicotine (NICODERM CQ) 21 mg/24 hr TD 24 hr patch Past Month  No Yes   Sig: Place 1 patch on the skin daily      Facility-Administered Medications: None       Past Medical History:   Diagnosis Date    Acid reflux     Anxiety     Arthritis     Cancer (HCC)     skin on chin    Cardiac disease     Chronic kidney disease     (R) kidney smaller than (L),  kidney stones    Chronic UTI (urinary tract infection) Colitis     microscopic    COPD (chronic obstructive pulmonary disease) (HCC)     mild, recent dx    Depression     Epidermal cyst of face     last assessed 10-    GERD (gastroesophageal reflux disease)     Heart attack ([address]) 09/20/2018    [place] (hard of hearing)     deaf (L) ear, decreased hearing (R) ear    Hyperlipemia     diet controlled    Hyperlipidemia     IBS (irritable bowel syndrome)     Malignant neoplasm of skin     Meniere disease     Migraine     Myocardial infarction ([address]) 09/2018    Numbness and tingling of left side of face     Uterine leiomyoma        Past Surgical History:   Procedure Laterality Date    CARDIAC CATHETERIZATION      9/21/18 [facility] - severe CAD of the LAD and diagonal artery    COLONOSCOPY N/A 11/4/2016    Procedure: COLONOSCOPY;  Surgeon: Willard Pope MD;  Location: [address] [place] [address] GI LAB; Service:     ERCP      ESOPHAGOGASTRODUODENOSCOPY N/A 11/4/2016    Procedure: ESOPHAGOGASTRODUODENOSCOPY (EGD); Surgeon: Willard Pope MD;  Location: [facility] GI LAB; Service:     EYE SURGERY      [place] XCAPSL CTRC RMVL INSJ IO LENS PROSTH W/O ECP Left 3/7/2016    Procedure: EXTRACTION EXTRACAPSULAR CATARACT PHACO INTRAOCULAR LENS (IOL); Surgeon: Carol Ann Holt MD;  Location: [facility] MAIN OR;  Service: Ophthalmology    SKIN CANCER EXCISION      excision skin cancer on chin       Family History   Problem Relation Age of Onset    Heart disease Mother         cardiac disorder    Lung cancer Mother     COPD Mother     Brain cancer Father     Colonic polyp Sister     Thyroid cancer Sister     Skin cancer Brother     Skin cancer Brother     Diabetes Son     Neuropathy Son     Multiple sclerosis Son     No Known Problems Son     Colon cancer Maternal Grandmother     Breast cancer Cousin 20     I have reviewed and agree with the history as documented.     E-Cigarette/Vaping     E-Cigarette/Vaping Substances     Social History     Tobacco Use    Smoking status: Every Day     Packs/day: 1.00 Years: 50.00     Total pack years: 50.00     Types: Cigarettes    Smokeless tobacco: Never    Tobacco comments:     45+ years per allscripts   Substance Use Topics    Alcohol use: No    Drug use: No       Review of Systems   Gastrointestinal:  Positive for nausea. Neurological:  Positive for light-headedness. All other systems reviewed and are negative. Physical Exam  Physical Exam  Vitals and nursing note reviewed. Constitutional:       General: She is not in acute distress. Appearance: Normal appearance. She is not ill-appearing. HENT:      Head: Normocephalic and atraumatic. Right Ear: External ear normal.      Left Ear: External ear normal.      Nose: Nose normal.      Mouth/Throat:      Mouth: Mucous membranes are moist.   Eyes:      General:         Right eye: No discharge. Left eye: No discharge. Conjunctiva/sclera: Conjunctivae normal.   Cardiovascular:      Rate and Rhythm: Normal rate and regular rhythm. Pulses: Normal pulses. Heart sounds: No murmur heard. Pulmonary:      Effort: Pulmonary effort is normal.      Breath sounds: Normal breath sounds. Abdominal:      General: Abdomen is flat. There is no distension. Tenderness: There is no abdominal tenderness. There is no guarding or rebound. Musculoskeletal:         General: Normal range of motion. Cervical back: Normal range of motion. Skin:     General: Skin is warm. Capillary Refill: Capillary refill takes less than 2 seconds. Findings: No rash. Neurological:      General: No focal deficit present. Mental Status: She is alert. Mental status is at baseline. Cranial Nerves: No cranial nerve deficit. Sensory: No sensory deficit. Motor: No weakness. Coordination: Coordination normal.      Comments: Normal gait.    Psychiatric:         Mood and Affect: Mood normal.         Behavior: Behavior normal.         Vital Signs  ED Triage Vitals   Temperature Pulse Respirations Blood Pressure SpO2   10/18/23 1044 10/18/23 1042 10/18/23 1044 10/18/23 1044 10/18/23 1042   98.3 °F (36.8 °C) 84 18 (!) 199/84 98 %      Temp Source Heart Rate Source Patient Position - Orthostatic VS BP Location FiO2 (%)   10/18/23 1044 10/18/23 1044 10/18/23 1044 10/18/23 1044 --   Oral Monitor Sitting Left arm       Pain Score       10/18/23 1044       3           Vitals:    10/18/23 1149 10/18/23 1150 10/18/23 1151 10/18/23 1152   BP:       Pulse: 63 78 62 62   Patient Position - Orthostatic VS:             Visual Acuity      ED Medications  Medications   hydrOXYzine HCL (ATARAX) tablet 25 mg (25 mg Oral Given 10/18/23 1149)   ondansetron (ZOFRAN-ODT) dispersible tablet 4 mg (4 mg Oral Given 10/18/23 1155)       Diagnostic Studies  Results Reviewed       None                   No orders to display              Procedures  Procedures         ED Course  ED Course as of 10/18/23 1248   Wed Oct 18, 2023   1203 Procedure Note: EKG  Date/Time: 10/18/23 12:03 PM   Interpreted by: Ismael Mckeon  Indications / Diagnosis: HTN  ECG reviewed by me, the ED Provider: yes   The EKG demonstrates:  Rhythm: normal sinus  Intervals: normal intervals  Axis: normal axis  QRS/Blocks: normal QRS  ST Changes: No acute ST Changes, no STD/VASYL. SBIRT 22yo+      Flowsheet Row Most Recent Value   Initial Alcohol Screen: US AUDIT-C     1. How often do you have a drink containing alcohol? 0 Filed at: 10/18/2023 1048   2. How many drinks containing alcohol do you have on a typical day you are drinking? 0 Filed at: 10/18/2023 1048   3a. Male UNDER 65: How often do you have five or more drinks on one occasion? 0 Filed at: 10/18/2023 1048   3b. FEMALE Any Age, or MALE 65+: How often do you have 4 or more drinks on one occassion? 0 Filed at: 10/18/2023 1048   Audit-C Score 0 Filed at: 10/18/2023 1048   MONICA: How many times in the past year have you. ..     Used an illegal drug or used a prescription medication for non-medical reasons? Never Filed at: 10/18/2023 1048                      Medical Decision Making  Patient with hypertension. Indigestion yesterday. Occasional nausea. Feels anxious. No CP today but notes indigestion yesterday. Recommended cardiac work-up, electrolyte work-up, renal function evaluation, hepatic function evaluation. .  Patient states that she is planning on going shopping and does not want any blood work performed. He is asking only for an EKG. Patient informed that I cannot rule out heart attacks with an EKG only a certain specific type of heart attack which is very infrequent in nature. Patient expresses understanding this but asked that I again hold off on blood work. Patient appears competent to make medical decisions. Patient has slight nausea. No changes in urination to suggest renal dysfunction. Received a work-up recently. EKG without signs of arrhythmia or ischemia. Will discharge home. Return at any point for blood work. Problems Addressed:  Hypertension: chronic illness or injury  Lightheaded: acute illness or injury  Nausea: acute illness or injury    Risk  Prescription drug management. Disposition  Final diagnoses:   Hypertension   Lightheaded   Anxiousness   Nausea     Time reflects when diagnosis was documented in both MDM as applicable and the Disposition within this note       Time User Action Codes Description Comment    10/18/2023 11:46 AM Donivan Rain Add [I10] Hypertension     10/18/2023 11:46 AM Donivan Rain Add [R42] Lightheaded     10/18/2023 11:46 AM Donivan Rain Add [F41.9] Anxiousness     10/18/2023 11:46 AM Donivan Rain Add [R11.0] Nausea           ED Disposition       ED Disposition   Discharge    Condition   Stable    Date/Time   Wed Oct 18, 2023 1203    [facility] discharge to home/self care.                    Follow-up Information       Follow up With Specialties Details Why Contact Info Additional Information    Kirby Russell DO Family Medicine  For re-evaluation as soon as possible [address]       [address] Emergency Department Emergency Medicine  If symptoms worsen [address] [id number]  [address] [place] [address] Emergency Department, [address] [place] [address], [address]            Discharge Medication List as of 10/18/2023 12:03 PM        START taking these medications    Details   hydrOXYzine HCL (ATARAX) 25 mg tablet Take 2 tablets (50 mg total) by mouth every 6 (six) hours as needed for anxiety for up to 20 doses Take 1 to 2 pills total every 6 hours as needed for anxiousness. , Starting Wed 10/18/2023, Normal      ondansetron (Zofran ODT) 4 mg disintegrating tablet Take 1 tablet (4 mg total) by mouth every 6 (six) hours as needed for nausea or vomiting, Starting Wed 10/18/2023, Normal           CONTINUE these medications which have NOT CHANGED    Details   labetalol (NORMODYNE) 100 mg tablet Take 100 mg by mouth 2 (two) times a day, Starting Tue 3/28/2023, Historical Med      LORazepam (ATIVAN) 0.5 mg tablet Take 0.5 mg by mouth 2 (two) times a day, Starting Tue 3/28/2023, Historical Med      nicotine (NICODERM CQ) 21 mg/24 hr TD 24 hr patch Place 1 patch on the skin daily, Starting Sat 9/17/2022, Normal      aspirin 81 mg chewable tablet Chew 81 mg daily, Historical Med             No discharge procedures on file.     PDMP Review       None            ED Provider  Electronically Signed by             Martha Haji DO  10/18/23 5823

## 2023-10-18 NOTE — DISCHARGE INSTRUCTIONS
Come back at any point for reevaluation and possible blood work as we discussed. Follow-up with your primary care doctor soon as possible. EKG today was without significant findings.

## 2023-10-19 ENCOUNTER — VBI (OUTPATIENT)
Dept: FAMILY MEDICINE CLINIC | Facility: CLINIC | Age: 68
End: 2023-10-19

## 2023-10-19 NOTE — TELEPHONE ENCOUNTER
10/19/23 10:26 AM    Patient contacted post ED visit, first outreach attempt made. Message was left for patient to return a call to the VBI Department at [facility]: Phone [phone]. Thank you.   Flaquita Saldivar MA  [facility]

## 2023-10-19 NOTE — LETTER
[address]   [address] [facility] [address]  [facility] [address]    Date: 10/23/23  [address] [place] [address]    Dear Lydia Davis:                                                                                                                              Thank you for choosing [facility] emergency department for care. Your primary care provider wants to make sure that your ongoing medical care is being addressed. If you require follow up care as a result of your emergency department visit, there are a few things the practice would like you to know. As part of the network's continuing commitment to caring for our patients, we have added more same day appointments and have extended office hours to meet your medical needs. After hours, on-call physicians are available via your primary care provider's main office line. We encourage you to contact our office prior to seeking treatment to discuss your symptoms with the medical staff. Together, we can determine the correct course of action. A majority of non-emergent conditions such as: common cold, flu-like symptoms, fevers, strains/sprains, dislocations, minor burns, cuts and animal bites can be treated at [facility]. Diagnostic testing is available at some sites. Of course, if you are experiencing a life threatening medical emergency call 911 or proceed directly to the nearest emergency room.     Your nearest [facility] facility is conveniently located at:    [facility] AT [facility]  [address] [facility] [address] [facility], [address] [place], [place] [id number]  7819  [address] offered at [address] your spot online at [email or web address] or on the [address] [place] [address] Drive    Sincerely,    [address]   Dept: [phone]

## 2023-10-20 NOTE — TELEPHONE ENCOUNTER
10/20/23 11:21 AM    Patient contacted post ED visit, second outreach attempt made. Message was left for patient to return a call to the VBI Department at [facility]: Phone [phone]. Thank you.   Flaquita Saldivar MA  [facility]

## 2023-10-23 NOTE — TELEPHONE ENCOUNTER
10/23/23 9:44 AM    Patient contacted post ED visit, phone outreaches were unsuccessful and a MyChart letter has been sent to the patient as follow-up. Thank you.   Flaquita Saldivar MA  [facility]

## 2024-02-05 ENCOUNTER — HOSPITAL ENCOUNTER (OUTPATIENT)
Dept: CT IMAGING | Facility: HOSPITAL | Age: 69
Discharge: HOME | End: 2024-02-05
Attending: INTERNAL MEDICINE
Payer: MEDICARE

## 2024-02-05 DIAGNOSIS — R91.8 LUNG NODULES: ICD-10-CM

## 2024-02-05 PROCEDURE — 71250 CT THORAX DX C-: CPT

## 2024-02-05 PROCEDURE — G1004 CDSM NDSC: HCPCS

## 2024-02-15 ENCOUNTER — TELEPHONE (OUTPATIENT)
Age: 69
End: 2024-02-15

## 2024-02-15 NOTE — TELEPHONE ENCOUNTER
Patient called in asking if Dr. Ruiz can give her a call to discuss her CT scan results from 2/5. Please advise.

## 2024-02-21 PROBLEM — J01.40 ACUTE NON-RECURRENT PANSINUSITIS: Status: RESOLVED | Noted: 2018-03-26 | Resolved: 2024-02-21

## 2024-02-21 NOTE — TELEPHONE ENCOUNTER
Patient calling again regarding her CT results and if Dr. Ruiz has looked them over. Please advise.

## 2024-02-29 NOTE — TELEPHONE ENCOUNTER
Patient called in very upset because she has been waiting 2 weeks on a call from Dr. Ruiz to discuss her CT results and she still has not received a call back. Patient is asking for a call back today since Dr. Ruiz is not available for an appointment until 3/26. Please advise.

## 2024-03-01 ENCOUNTER — TELEPHONE (OUTPATIENT)
Dept: PULMONOLOGY | Facility: MEDICAL CENTER | Age: 69
End: 2024-03-01

## 2024-03-01 ENCOUNTER — TELEPHONE (OUTPATIENT)
Dept: OTHER | Facility: HOSPITAL | Age: 69
End: 2024-03-01

## 2024-03-02 NOTE — TELEPHONE ENCOUNTER
I spoke to patient regarding CT of chest that was done on February 5, 2023 for follow-up of lung nodules that she is feeling well.  She does live about 3 hours from this location.  She does see doctors locally here in [facility]    I reviewed CT images of chest and this shows 6 mm superior segment right lower lobe nodule slightly increased from 5 mm in August 2023.  Also has few groundglass subcentimeter opacities largest one being posterior segment of right upper lobe..  There is mention of a subcentimeter cystic lesion posterior segment right upper lobe    She does have 1.4 cm low-tension smoothly marginated solid right lower lobe lung lesion present since 2008 with slight increase over time but stable compared to August 2022.    I suspect that the right lower lobe lung lesion which is 1.4 cm benign is probably hamartoma.  Other lesion seen in right upper lobe and superior segment right lower very small.  I did review with interventional radiologist he is likely would not be able to be biopsied as they are too small.  I spoke to patient regarding this and she would prefer to just follow-up CT scan.  Will make appointment with her to be seen by me later this March and likely do follow-up CAT scan in either 3 or 6 months depending our discussion

## 2024-03-04 ENCOUNTER — OFFICE VISIT (OUTPATIENT)
Dept: OBGYN CLINIC | Facility: CLINIC | Age: 69
End: 2024-03-04
Payer: MEDICARE

## 2024-03-04 ENCOUNTER — HOSPITAL ENCOUNTER (OUTPATIENT)
Dept: RADIOLOGY | Facility: HOSPITAL | Age: 69
Discharge: HOME | End: 2024-03-04
Payer: MEDICARE

## 2024-03-04 VITALS — HEART RATE: 84 BPM | BODY MASS INDEX: 28.89 KG/M2 | OXYGEN SATURATION: 96 % | WEIGHT: 157 LBS | HEIGHT: 62 IN

## 2024-03-04 DIAGNOSIS — S83.8X1A INJURY OF MENISCUS OF RIGHT KNEE, INITIAL ENCOUNTER: ICD-10-CM

## 2024-03-04 DIAGNOSIS — M25.561 RIGHT KNEE PAIN, UNSPECIFIED CHRONICITY: Primary | ICD-10-CM

## 2024-03-04 DIAGNOSIS — M25.561 RIGHT KNEE PAIN, UNSPECIFIED CHRONICITY: ICD-10-CM

## 2024-03-04 PROCEDURE — 99204 OFFICE O/P NEW MOD 45 MIN: CPT | Performed by: PHYSICIAN ASSISTANT

## 2024-03-04 PROCEDURE — 73562 X-RAY EXAM OF KNEE 3: CPT

## 2024-03-04 RX ORDER — BEMPEDOIC ACID AND EZETIMIBE 180; 10 MG/1; MG/1
1 TABLET, FILM COATED ORAL
COMMUNITY

## 2024-03-04 RX ORDER — ROSUVASTATIN CALCIUM 5 MG/1
5 TABLET, COATED ORAL
COMMUNITY
Start: 2023-12-24

## 2024-03-04 NOTE — PROGRESS NOTES
Assessment/Plan   Diagnoses and all orders for this visit:    Right knee pain, acute    Injury of meniscus of right knee, suspected    - Start PT  - Hinged brace  - Cortisone injection declined  - Ice as needed  - Follow up with orthopedics in your area (She lives several hours away, and is just here visiting her son this week)          Subjective   Patient ID: Maylin Aranda is a 68 y.o. female.    Vitals:    03/04/24 0946   Pulse: 84   SpO2: 96%     67yo female comes in for an evaluation of her right knee.  She has been having some pain for 1-2 weeks with no specific injury.  The pain is dull in character, mild in severity, does not radiate and is not associated with numbness.  The pain is located in the medial aspect of the knee.  No clicking, catching, or locking.  No fevers or chills.          The following portions of the patient's history were reviewed and updated as appropriate: allergies, current medications, past family history, past medical history, past social history, past surgical history, and problem list.    Review of Systems  Ortho Exam  Past Medical History:   Diagnosis Date    Acid reflux     Anxiety     Arthritis     Cancer ([facility])     skin on chin    Cardiac disease     Chronic kidney disease     (R) kidney smaller than (L),  kidney stones    Chronic UTI (urinary tract infection)     Colitis     microscopic    COPD (chronic obstructive pulmonary disease) ([facility])     mild, recent dx    Depression     Epidermal cyst of face     last assessed 10-    GERD (gastroesophageal reflux disease)     Heart attack ([facility]) 09/20/2018    [place] (hard of hearing)     deaf (L) ear, decreased hearing (R) ear    Hyperlipemia     diet controlled    Hyperlipidemia     IBS (irritable bowel syndrome)     Malignant neoplasm of skin     Meniere disease     Migraine     Myocardial infarction ([facility]) 09/2018    Numbness and tingling of left side of face     Uterine leiomyoma      Past Surgical History:   Procedure Laterality  Date    CARDIAC CATHETERIZATION      9/21/18 [facility] - severe CAD of the LAD and diagonal artery    COLONOSCOPY N/A 11/4/2016    Procedure: COLONOSCOPY;  Surgeon: Perry Miles MD;  Location: [facility] GI LAB;  Service:     ERCP      ESOPHAGOGASTRODUODENOSCOPY N/A 11/4/2016    Procedure: ESOPHAGOGASTRODUODENOSCOPY (EGD);  Surgeon: Perry Miles MD;  Location: [facility] GI LAB;  Service:     EYE SURGERY      [place] XCAPSL CTRC RMVL INSJ IO LENS PROSTH W/O ECP Left 3/7/2016    Procedure: EXTRACTION EXTRACAPSULAR CATARACT PHACO INTRAOCULAR LENS (IOL);  Surgeon: Huy Blancas MD;  Location: [facility] MAIN OR;  Service: Ophthalmology    SKIN CANCER EXCISION      excision skin cancer on chin     Family History   Problem Relation Age of Onset    Heart disease Mother         cardiac disorder    Lung cancer Mother     COPD Mother     Brain cancer Father     Colonic polyp Sister     Thyroid cancer Sister     Skin cancer Brother     Skin cancer Brother     Diabetes Son     Neuropathy Son     Multiple sclerosis Son     No Known Problems Son     Colon cancer Maternal Grandmother     Breast cancer Cousin 20     Social History     Occupational History    Not on file   Tobacco Use    Smoking status: Every Day     Current packs/day: 1.00     Average packs/day: 1 pack/day for 50.0 years (50.0 ttl pk-yrs)     Types: Cigarettes    Smokeless tobacco: Never    Tobacco comments:     45+ years per allscripts   Substance and Sexual Activity    Alcohol use: No    Drug use: No    Sexual activity: Not on file       Review of Systems   Constitutional: Negative.    HENT: Negative.    Eyes: Negative.    Respiratory: Negative.    Cardiovascular: Negative.    Gastrointestinal: Negative.    Endocrine: Negative.    Genitourinary: Negative.    Musculoskeletal: As below..   Allergic/Immunologic: Negative.    Neurological: Negative.    Hematological: Negative.    Psychiatric/Behavioral: Negative.        Objective   Physical Exam    Constitutional: Awake,  Alert, Oriented  Eyes: EOMI  Psych: Mood and affect appropriate  Heart: regular rate   Lungs: No audible wheezing  Abdomen: No guarding  Lymph: no lymphedema  right Knee:  Appearance  No swelling, discoloration, deformity, or ecchymosis  Effusion  none  Palpation  + Tenderness medial joint line.  No tenderness of the patella, patellar tendon, pes anserine, lateral joint line, MCL, LCL, medial / lateral plateau  ROM  Extension: 5 and Flexion: 130  Special Tests  Allison's Test negative, Lachman's Test negative, Anterior Drawer Test negative, Posterior Drawer Test negative, Valgus Stress Test negative, Varus Stress Test negative, and Patellar apprehension negative  Motor  normal 5/5 in all planes  NVI distally    Xrays:  I have personally reviewed pertinent films in PACS and my interpretation is No acute displaced fracture.  Mild OA.  Radiologist reading pending.

## 2024-03-05 NOTE — PRE-PROCEDURE INSTRUCTIONS
Pre-Surgery Instructions:   Medication Instructions    aspirin 81 mg chewable tablet Take day of surgery.    labetalol (NORMODYNE) 100 mg tablet Take day of surgery.    LORazepam (ATIVAN) 0.5 mg tablet Take day of surgery.    Nexlizet 180-10 MG TABS Take night before surgery    nicotine (NICODERM CQ) 21 mg/24 hr TD 24 hr patch Take day of surgery.    rosuvastatin (CRESTOR) 5 mg tablet Take night before surgery      Medication instructions for day surgery reviewed. Please use only a sip of water to take your instructed medications. Per Dr. Blancas, no hold on blood thinners or supplements pre-op. Tylenol is ok to take as needed.     You will receive a call one business day prior to surgery with an arrival time and hospital directions. If your surgery is scheduled on a Monday, the hospital will be calling you on the Friday prior to your surgery. If you have not heard from anyone by 8pm, please call the hospital supervisor through the hospital  at [phone]. ([place] [phone] or [place] [phone]).    Do not eat or drink anything after midnight the night before your surgery, including candy, mints, lifesavers, or chewing gum. Do not drink alcohol 24hrs before your surgery. Try not to smoke at least 24hrs before your surgery.       Follow the pre surgery showering instructions as listed in the “My Surgical Experience Booklet” or otherwise provided by your surgeon's office. Do not use a blade to shave the surgical area 1 week before surgery. It is okay to use a clean electric clippers up to 24 hours before surgery. Do not apply any lotions, creams, including makeup, cologne, deodorant, or perfumes after showering on the day of your surgery. Do not use dry shampoo, hair spray, hair gel, or any type of hair products.     No contact lenses, eye make-up, or artificial eyelashes. Remove nail polish, including gel polish, and any artificial, gel, or acrylic nails if possible. Remove all jewelry including  rings and body piercing jewelry.     Wear causal clothing that is easy to take on and off. Consider your type of surgery.    Keep any valuables, jewelry, piercings at home. Please bring any specially ordered equipment (sling, braces) if indicated.    Arrange for a responsible person to drive you to and from the hospital on the day of your surgery. Please confirm the visitor policy for the day of your procedure when you receive your phone call with an arrival time.     Call the surgeon's office with any new illnesses, exposures, or additional questions prior to surgery.    Please reference your “My Surgical Experience Booklet” for additional information to prepare for your upcoming surgery.

## 2024-03-06 ENCOUNTER — PATIENT OUTREACH (OUTPATIENT)
Dept: OTHER | Facility: CLINIC | Age: 69
End: 2024-03-06

## 2024-03-10 ENCOUNTER — ANESTHESIA EVENT (OUTPATIENT)
Dept: PERIOP | Facility: AMBULARY SURGERY CENTER | Age: 69
End: 2024-03-10
Payer: MEDICARE

## 2024-03-11 ENCOUNTER — HOSPITAL ENCOUNTER (OUTPATIENT)
Facility: AMBULARY SURGERY CENTER | Age: 69
Setting detail: OUTPATIENT SURGERY
Discharge: HOME | End: 2024-03-11
Attending: OPHTHALMOLOGY | Admitting: OPHTHALMOLOGY
Payer: MEDICARE

## 2024-03-11 ENCOUNTER — ANESTHESIA (OUTPATIENT)
Dept: PERIOP | Facility: AMBULARY SURGERY CENTER | Age: 69
End: 2024-03-11
Payer: MEDICARE

## 2024-03-11 VITALS
HEART RATE: 82 BPM | TEMPERATURE: 98 F | SYSTOLIC BLOOD PRESSURE: 129 MMHG | OXYGEN SATURATION: 98 % | HEIGHT: 62 IN | BODY MASS INDEX: 28.89 KG/M2 | DIASTOLIC BLOOD PRESSURE: 84 MMHG | WEIGHT: 157 LBS | RESPIRATION RATE: 18 BRPM

## 2024-03-11 DIAGNOSIS — H25.811 COMBINED FORMS OF AGE-RELATED CATARACT OF RIGHT EYE: ICD-10-CM

## 2024-03-11 DIAGNOSIS — T65.291A TOXIC EFFECT OF TOBACCO AND NICOTINE, ACCIDENTAL OR UNINTENTIONAL, INITIAL ENCOUNTER: Primary | ICD-10-CM

## 2024-03-11 PROCEDURE — V2632 POST CHMBR INTRAOCULAR LENS: HCPCS | Performed by: OPHTHALMOLOGY

## 2024-03-11 DEVICE — STERILE UV AND BLUE LIGHT FILTERING ACRYLIC FOLDABLE ASPHERIC POSTERIOR CHAMBER INTRAOCULAR LENS
Type: IMPLANTABLE DEVICE | Site: EYE | Status: FUNCTIONAL
Brand: CLAREON

## 2024-03-11 RX ORDER — KETOROLAC TROMETHAMINE 5 MG/ML
1 SOLUTION OPHTHALMIC
Status: ACTIVE | OUTPATIENT
Start: 2024-03-11 | End: 2024-03-11

## 2024-03-11 RX ORDER — PREDNISOLONE ACETATE 10 MG/ML
1 SUSPENSION/ DROPS OPHTHALMIC 4 TIMES DAILY
Start: 2024-03-11

## 2024-03-11 RX ORDER — LIDOCAINE HYDROCHLORIDE 10 MG/ML
INJECTION, SOLUTION EPIDURAL; INFILTRATION; INTRACAUDAL; PERINEURAL AS NEEDED
Status: DISCONTINUED | OUTPATIENT
Start: 2024-03-11 | End: 2024-03-11 | Stop reason: HOSPADM

## 2024-03-11 RX ORDER — TOBRAMYCIN 3 MG/ML
SOLUTION/ DROPS OPHTHALMIC AS NEEDED
Status: DISCONTINUED | OUTPATIENT
Start: 2024-03-11 | End: 2024-03-11 | Stop reason: HOSPADM

## 2024-03-11 RX ORDER — POLYMYXIN B SULFATE AND TRIMETHOPRIM 1; 10000 MG/ML; [USP'U]/ML
1 SOLUTION OPHTHALMIC 4 TIMES DAILY
Start: 2024-03-11 | End: 2024-03-21

## 2024-03-11 RX ORDER — LIDOCAINE HYDROCHLORIDE 20 MG/ML
1 JELLY TOPICAL
Status: COMPLETED | OUTPATIENT
Start: 2024-03-11 | End: 2024-03-11

## 2024-03-11 RX ORDER — TETRACAINE HYDROCHLORIDE 5 MG/ML
1 SOLUTION OPHTHALMIC ONCE
Status: COMPLETED | OUTPATIENT
Start: 2024-03-11 | End: 2024-03-11

## 2024-03-11 RX ORDER — PHENYLEPHRINE HYDROCHLORIDE 25 MG/ML
1 SOLUTION/ DROPS OPHTHALMIC
Status: ACTIVE | OUTPATIENT
Start: 2024-03-11 | End: 2024-03-11

## 2024-03-11 RX ORDER — CYCLOPENTOLATE HYDROCHLORIDE 10 MG/ML
1 SOLUTION/ DROPS OPHTHALMIC
Status: ACTIVE | OUTPATIENT
Start: 2024-03-11 | End: 2024-03-11

## 2024-03-11 RX ORDER — MIDAZOLAM HYDROCHLORIDE 2 MG/2ML
INJECTION, SOLUTION INTRAMUSCULAR; INTRAVENOUS AS NEEDED
Status: DISCONTINUED | OUTPATIENT
Start: 2024-03-11 | End: 2024-03-11

## 2024-03-11 RX ORDER — BALANCED SALT SOLUTION 6.4; .75; .48; .3; 3.9; 1.7 MG/ML; MG/ML; MG/ML; MG/ML; MG/ML; MG/ML
SOLUTION OPHTHALMIC AS NEEDED
Status: DISCONTINUED | OUTPATIENT
Start: 2024-03-11 | End: 2024-03-11 | Stop reason: HOSPADM

## 2024-03-11 RX ORDER — KETOROLAC TROMETHAMINE 5 MG/ML
1 SOLUTION OPHTHALMIC 4 TIMES DAILY
Start: 2024-03-11

## 2024-03-11 RX ORDER — TETRACAINE HYDROCHLORIDE 5 MG/ML
SOLUTION OPHTHALMIC AS NEEDED
Status: DISCONTINUED | OUTPATIENT
Start: 2024-03-11 | End: 2024-03-11 | Stop reason: HOSPADM

## 2024-03-11 RX ADMIN — PHENYLEPHRINE HYDROCHLORIDE 1 DROP: 25 SOLUTION/ DROPS OPHTHALMIC at 09:30

## 2024-03-11 RX ADMIN — CYCLOPENTOLATE HYDROCHLORIDE 1 DROP: 10 SOLUTION/ DROPS OPHTHALMIC at 09:30

## 2024-03-11 RX ADMIN — CYCLOPENTOLATE HYDROCHLORIDE 1 DROP: 10 SOLUTION/ DROPS OPHTHALMIC at 09:00

## 2024-03-11 RX ADMIN — PHENYLEPHRINE HYDROCHLORIDE 1 DROP: 25 SOLUTION/ DROPS OPHTHALMIC at 09:00

## 2024-03-11 RX ADMIN — KETOROLAC TROMETHAMINE 1 DROP: 5 SOLUTION OPHTHALMIC at 09:00

## 2024-03-11 RX ADMIN — KETOROLAC TROMETHAMINE 1 DROP: 5 SOLUTION OPHTHALMIC at 09:15

## 2024-03-11 RX ADMIN — KETOROLAC TROMETHAMINE 1 DROP: 5 SOLUTION OPHTHALMIC at 09:30

## 2024-03-11 RX ADMIN — PHENYLEPHRINE HYDROCHLORIDE 1 DROP: 25 SOLUTION/ DROPS OPHTHALMIC at 09:15

## 2024-03-11 RX ADMIN — CYCLOPENTOLATE HYDROCHLORIDE 1 DROP: 10 SOLUTION/ DROPS OPHTHALMIC at 09:15

## 2024-03-11 RX ADMIN — MIDAZOLAM 2 MG: 1 INJECTION INTRAMUSCULAR; INTRAVENOUS at 09:44

## 2024-03-11 RX ADMIN — LIDOCAINE HYDROCHLORIDE 1 APPLICATION: 20 JELLY TOPICAL at 09:30

## 2024-03-11 RX ADMIN — TETRACAINE HYDROCHLORIDE 1 DROP: 5 SOLUTION OPHTHALMIC at 09:00

## 2024-03-11 RX ADMIN — LIDOCAINE HYDROCHLORIDE 1 APPLICATION: 20 JELLY TOPICAL at 09:15

## 2024-03-11 RX ADMIN — LIDOCAINE HYDROCHLORIDE 1 APPLICATION: 20 JELLY TOPICAL at 09:00

## 2024-03-11 NOTE — DISCHARGE INSTR - AVS FIRST PAGE
Dr. Blancas Cataract Instructions    Activity:     1.  No Driving until instructed   2.  Keep shield on until seen tomorrow except when administering drops   3.  No heavy lifting   4.  No water in eye     Diet:     1.  Resume normal diet    Normal Symptoms:     1.  Mild Headache   2. Scratchy or picky feeling around eye    Call the office if:     1.  You have any questions or concerns   2.  If eye pain is not relieved by extra strength tylenol    Office phone number:  [phone]      Next appointment:     1.  See Dr Blancas at his office tomorrow as scheduled   __________________________________________________________   2.  Bring blue eye kit with you and eyedrops to the office    A new set of comprehensive instructions will be given and reviewed with you during your office visit tomorrow.

## 2024-03-11 NOTE — ANESTHESIA PREPROCEDURE EVALUATION
Procedure:  EXTRACTION EXTRACAPSULAR CATARACT PHACO INTRAOCULAR LENS (IOL) (Right: Eye)    Relevant Problems   ANESTHESIA (within normal limits)      CARDIO   (+) CAD (coronary artery disease)   (+) Coronary artery disease involving native coronary artery of native heart without angina pectoris   (+) Hyperlipidemia      ENDO (within normal limits)      GI/HEPATIC   (+) Gastroesophageal reflux disease without esophagitis      MUSCULOSKELETAL   (+) Back pain of lumbar region with sciatica      NEURO/PSYCH   (+) Mixed anxiety depressive disorder      PULMONARY   (+) Centrilobular emphysema (HCC)   (+) MARY (obstructive sleep apnea)   (+) Smoker      Other   (+) Cigarette nicotine dependence without complication        Physical Exam    Airway    Mallampati score: II  TM Distance: >3 FB  Neck ROM: full     Dental    upper dentures    Cardiovascular      Pulmonary      Other Findings  Upper partial platepost-pubertal.      Anesthesia Plan  ASA Score- 3     Anesthesia Type- IV sedation with anesthesia with ASA Monitors.         Additional Monitors:     Airway Plan:            Plan Factors-Exercise tolerance (METS): >4 METS.    Chart reviewed. EKG reviewed.  Existing labs reviewed. Patient summary reviewed.    Patient is a current smoker.  Patient smoked on day of surgery.            Induction-     Postoperative Plan-     Informed Consent- Anesthetic plan and risks discussed with patient.  I personally reviewed this patient with the CRNA. Discussed and agreed on the Anesthesia Plan with the CRNA..

## 2024-03-11 NOTE — OP NOTE
OPERATIVE REPORT    PATIENT NAME: Maylin Aranda    :  1955  MRN: [id number]  Pt Location: [facility] OR ROOM 01    Surgery Date: 3/11/2024    Surgeons and Role:     * Huy Blancas MD - Primary    Cortical age-related cataract, right eye [H25.011]    Post-Op Diagnosis Codes:     * Cortical age-related cataract, right eye [H25.011]    Procedure(s):  EXTRACTION EXTRACAPSULAR CATARACT PHACO INTRAOCULAR LENS (IOL)    Anesthesia Type:   IV Sedation with Anesthesia    Operative Indications:  Cortical age-related cataract, right eye [H25.011]  Decreased vision to 20/40. With problems driving.  Pt requested cataract sx the right eye    Procedure and Technique:    Procedure Details     The patient was brought in the OR in stable condition and placed on the operative table. The right eye was prepped and draped in the usual sterile manner. Attention was directed to the right eye where a lid speculum was placed. A 2.4 mm clear corneal incision was made temperally. 1/2 cc of 1% MPF Lidocaine was irrigated into the anterior chamber followed by viscoat. The side port incision was placed superiorly. The capsularrhexis was made and the nucleus was hydrodissected with BSS. The nucleus was then removed with the phaco handpiece followed by removal of the cortical material with the I/A handpiece. The capsular bag was then filled with Provisc. The IOL was folded and placed in to the capsular bag and centered well. The remaining Provisc was removed from the eye with the I/A. The wounds were hydrated with BSS and found to be water tight. The lid speculum was removed and 2 drops of tobramycin were placed over the cornea. A protective eye shield was taped over the eye and the patient went to PACU in stable condition. I will see the patient in the office tomorrow and the expected post op period is a few weeks.       Complications: None        Disposition: PACU   Condition: Stable    SIGNATURE: Huy Blancas MD  DATE:   2024  TIME: 10:15 AM

## 2024-03-11 NOTE — ANESTHESIA POSTPROCEDURE EVALUATION
Post-Op Assessment Note    CV Status:  Stable  Pain Score: 0    Pain management: adequate       Mental Status:  Alert   Hydration Status:  Stable   PONV Controlled:  None   Airway Patency:  Patent  Two or more mitigation strategies used for obstructive sleep apnea   Post Op Vitals Reviewed: Yes    No anethesia notable event occurred.    Staff: CRAIG               /73   Temp 97   Pulse 78   Resp 16   SpO2 99

## 2024-03-18 ENCOUNTER — TELEPHONE (OUTPATIENT)
Age: 69
End: 2024-03-18

## 2024-03-18 NOTE — TELEPHONE ENCOUNTER
Caller: Maylin    Doctor: Nettie    Reason for call: Patient needs to have PT RX  faxed [phone] [facility]    Call back#: [phone]

## 2024-03-25 ENCOUNTER — TELEPHONE (OUTPATIENT)
Age: 69
End: 2024-03-25

## 2024-04-04 NOTE — TELEPHONE ENCOUNTER
The patient called  she wanted to check the status on the gallo request please call her when it is done   thank you

## 2024-04-10 ENCOUNTER — OFFICE VISIT (OUTPATIENT)
Dept: PULMONOLOGY | Facility: MEDICAL CENTER | Age: 69
End: 2024-04-10

## 2024-04-10 VITALS
WEIGHT: 156 LBS | HEIGHT: 62 IN | TEMPERATURE: 97.8 F | HEART RATE: 80 BPM | OXYGEN SATURATION: 96 % | DIASTOLIC BLOOD PRESSURE: 64 MMHG | BODY MASS INDEX: 28.71 KG/M2 | SYSTOLIC BLOOD PRESSURE: 120 MMHG | RESPIRATION RATE: 12 BRPM

## 2024-04-10 DIAGNOSIS — R91.8 LUNG NODULES: Primary | ICD-10-CM

## 2024-04-10 DIAGNOSIS — F17.210 CIGARETTE NICOTINE DEPENDENCE WITHOUT COMPLICATION: ICD-10-CM

## 2024-04-10 DIAGNOSIS — J43.2 CENTRILOBULAR EMPHYSEMA (HCC): ICD-10-CM

## 2024-04-10 RX ORDER — EZETIMIBE 10 MG/1
TABLET ORAL
COMMUNITY
Start: 2024-03-21

## 2024-04-10 NOTE — PROGRESS NOTES
Assessment/Plan        Problem List Items Addressed This Visit          Respiratory    Centrilobular emphysema (HCC)     Maylin has only very mild COPD and not needing any inhaler therapy at this time.         Lung nodules - Primary     Maylin does have lung nodules.  Has stable 1.4 cm right lower lobe lung nodule which appears benign.  This has been remaining stable in size for several years.  Most recent CT scan of chest on 2/5/2024 showed some minimal enlargement of 6 mm right lower lobe lung nodule.  It now measures 6 mm previously was 5 mm.  There are some subcentimeter groundglass infiltrates bilaterally.    I did order a follow-up CT scan of chest to be done in 3 months or May of this year.  Since she lives a long distance from here she can call me to review results with her.         Relevant Orders    CT chest without contrast       Behavioral Health    Cigarette nicotine dependence without complication     Still smokes up to a pack of cigarettes per day.  I did discuss smoking cessation with her.              Cc:  periodic wheeze      HPI    Maylin has mild COPD and mild MARY.  She does have history of lung nodules.  She is smoking a pack of cigarettes per day and has smoked about a pack of cigarettes per day for most of her adult life.  Not ready to quit smoking.  She does have periodic wheeze but not having any difficulty breathing and does not feel like she needs any inhaler therapy.She now lives in [place], [place] and has just over 2 hour drive to get here.  Her son does live locally and she will stay with him for a few days when she comes to this region for testing her to see her physicians.    She is here to review CT of chest done 2/5/24 revealed minimal enlargement of right lower lobe lung nodule which now measures 6 mm and small slightly cystic lesion posterior segment right upper lobe with no solid component.  There is mild emphysematous changes.  There are some benign calcified granulomas.  Also  "a few scattered small less than centimeter groundglass opacities bilaterally.  She also has stable 1.4 cm right lower lobe lung nodule which is smooth in appearance overall this nodule right lower lobe is fairly stable compared to prior CAT scan this evening 2021 and even going back further to previous scans.  Including PET CT scan in January 2018      Last PFT done and 2021 showed FEV1 to be 1.84 L or 82% of predicted and she had mild air trapping.  She is not needing any inhaler therapy.    Did not have any weight loss, fever chills or night sweats.  No difficulty breathing.  Does have history of smoking 1 pack of cigarettes per day for 50 years still smoking.    Does have history of very mild MARY diagnosed by home sleep study in June 2021 with overall HI of 8.2.  She did not have any excessive daytime somnolence and did not want to pursue PAP therapy.      Past Medical History:   Diagnosis Date    Acid reflux     Anxiety     Arthritis     Cancer (HCC)     skin on chin    Cardiac disease     Chronic kidney disease     (R) kidney smaller than (L),  kidney stones    Chronic UTI (urinary tract infection)     Colitis     microscopic    COPD (chronic obstructive pulmonary disease) (HCC)     mild, recent dx    Depression     Disease of thyroid gland     \"pt reports nodules\"    Epidermal cyst of face     last assessed 10-    GERD (gastroesophageal reflux disease)     Heart attack (HCC) 09/20/2018    [place] (hard of hearing)     deaf (L) ear, decreased hearing (R) ear    Hyperlipemia     diet controlled    Hyperlipidemia     Hypertension     IBS (irritable bowel syndrome)     Malignant neoplasm of skin     Meniere disease     Migraine     Myocardial infarction (HCC) 09/2018    Numbness and tingling of left side of face     Sleep apnea     Uterine leiomyoma        Past Surgical History:   Procedure Laterality Date    CARDIAC CATHETERIZATION      9/21/18 [facility] - severe CAD of the LAD and diagonal artery    " COLONOSCOPY N/A 11/4/2016    Procedure: COLONOSCOPY;  Surgeon: Perry Miles MD;  Location: [facility] GI LAB;  Service:     ERCP      ESOPHAGOGASTRODUODENOSCOPY N/A 11/4/2016    Procedure: ESOPHAGOGASTRODUODENOSCOPY (EGD);  Surgeon: Perry Miles MD;  Location: [facility] GI LAB;  Service:     EYE SURGERY      KS XCAPSL CTRC RMVL INSJ IO LENS PROSTH W/O ECP Left 3/7/2016    Procedure: EXTRACTION EXTRACAPSULAR CATARACT PHACO INTRAOCULAR LENS (IOL);  Surgeon: Huy Blancas MD;  Location: [facility] MAIN OR;  Service: Ophthalmology    KS XCAPSL CTRC RMVL INSJ IO LENS PROSTH W/O ECP Right 3/11/2024    Procedure: EXTRACTION EXTRACAPSULAR CATARACT PHACO INTRAOCULAR LENS (IOL);  Surgeon: Huy Blancas MD;  Location: [facility] MAIN OR;  Service: Ophthalmology    SKIN CANCER EXCISION      excision skin cancer on chin         Current Outpatient Medications:     aspirin 81 mg chewable tablet, Chew 81 mg daily, Disp: , Rfl:     labetalol (NORMODYNE) 100 mg tablet, Take 100 mg by mouth 2 (two) times a day, Disp: , Rfl:     LORazepam (ATIVAN) 0.5 mg tablet, Take 0.5 mg by mouth 2 (two) times a day, Disp: , Rfl:     Nexlizet 180-10 MG TABS, Take 1 tablet by mouth daily at bedtime, Disp: , Rfl:     nicotine (NICODERM CQ) 21 mg/24 hr TD 24 hr patch, Place 1 patch on the skin daily, Disp: 28 patch, Rfl: 0    rosuvastatin (CRESTOR) 5 mg tablet, Take 5 mg by mouth daily at bedtime, Disp: , Rfl:     ezetimibe (ZETIA) 10 mg tablet, , Disp: , Rfl:     hydrOXYzine HCL (ATARAX) 25 mg tablet, Take 2 tablets (50 mg total) by mouth every 6 (six) hours as needed for anxiety for up to 20 doses Take 1 to 2 pills total every 6 hours as needed for anxiousness. (Patient not taking: Reported on 3/5/2024), Disp: 40 tablet, Rfl: 0    ketorolac (ACULAR) 0.5 % ophthalmic solution, Administer 1 drop to the right eye 4 (four) times a day (Patient not taking: Reported on 4/10/2024), Disp: , Rfl:     ondansetron (Zofran ODT) 4 mg disintegrating tablet, Take 1 tablet  (4 mg total) by mouth every 6 (six) hours as needed for nausea or vomiting (Patient not taking: Reported on 3/4/2024), Disp: 20 tablet, Rfl: 0    prednisoLONE acetate (PRED FORTE) 1 % ophthalmic suspension, Administer 1 drop to the right eye 4 (four) times a day (Patient not taking: Reported on 4/10/2024), Disp: , Rfl:   No current facility-administered medications for this visit.    Allergies   Allergen Reactions    Allegra [Fexofenadine] Other (See Comments)     Per allergist pt states she was advised not to take any antihistamines in 2018    Erythromycin Hives and Itching    Nizatidine Other (See Comments)     Severe chest pain  Severe chest pain    Penicillins Hives and Itching     Rash, hives    Prilosec [Omeprazole] Other (See Comments)     Had heart attack, advised by allergist not to take any pump inhibitors and no antihistamines    Wellbutrin [Bupropion] Other (See Comments)     Elevated  / 110    Ciprofloxacin Hcl Hives    Fish-Derived Products - Food Allergy GI Intolerance     Tuna fish & all shell fish    Other Hives     allegic to all cillans and all mycins    Ampicillin     Benadryl [Diphenhydramine Hcl] Other (See Comments)     unknown    Ciprofloxacin      Severe hives, sob?    Clindamycin     Diphenhydramine      Facial swelling ?    Doxycycline     Lovastatin      Muscle aches  Muscle aches    Nickel Other (See Comments)     Throat swelling/itching    Shellfish-Derived Products - Food Allergy GI Intolerance     Pt unsure       Social History     Tobacco Use    Smoking status: Every Day     Current packs/day: 1.00     Average packs/day: 1 pack/day for 50.0 years (50.0 ttl pk-yrs)     Types: Cigarettes    Smokeless tobacco: Never    Tobacco comments:     52+ years per allscripts, still smoking every day, removes patch to smoke   Substance Use Topics    Alcohol use: No         Family History   Problem Relation Age of Onset    Heart disease Mother         cardiac disorder    Lung cancer Mother   "   COPD Mother     Brain cancer Father     Colonic polyp Sister     Thyroid cancer Sister     Skin cancer Brother     Skin cancer Brother     Diabetes Son     Neuropathy Son     Multiple sclerosis Son     No Known Problems Son     Colon cancer Maternal Grandmother     Breast cancer Cousin 20       Review of Systems   Constitutional:  Negative for chills, fever and unexpected weight change.   HENT:  Negative for congestion, rhinorrhea and sore throat.    Eyes:  Negative for discharge and redness.   Respiratory:  Negative for shortness of breath.    Cardiovascular:  Negative for chest pain, palpitations and leg swelling.   Gastrointestinal:  Negative for abdominal distention, abdominal pain and nausea.   Endocrine: Negative for polydipsia and polyphagia.   Genitourinary:  Negative for dysuria.   Musculoskeletal:  Negative for joint swelling and myalgias.   Skin:  Negative for rash.   Neurological:  Negative for light-headedness.   Psychiatric/Behavioral:  Negative for decreased concentration.            Vitals:    04/10/24 0936   BP: 120/64   Pulse: 80   Resp: 12   Temp: 97.8 °F (36.6 °C)   SpO2: 96%     Height: 5' 2\" (157.5 cm)  IBW (Ideal Body Weight): 50.1 kg  Body mass index is 28.53 kg/m².  Weight (last 2 days)       Date/Time Weight    04/10/24 0936 70.8 (156)                Physical Exam  Vitals reviewed.   Constitutional:       General: She is not in acute distress.     Appearance: Normal appearance. She is well-developed.   HENT:      Head: Normocephalic.      Right Ear: External ear normal.      Left Ear: External ear normal.      Nose: Nose normal.      Mouth/Throat:      Mouth: Mucous membranes are moist.      Pharynx: Oropharynx is clear. No oropharyngeal exudate.   Eyes:      Conjunctiva/sclera: Conjunctivae normal.      Pupils: Pupils are equal, round, and reactive to light.   Cardiovascular:      Rate and Rhythm: Normal rate and regular rhythm.      Heart sounds: Normal heart sounds.   Pulmonary:      " Effort: Pulmonary effort is normal.      Comments: Lung sounds reveal few faint scattered expiratory wheezes bilaterally.  No crackles or rhonchi  Abdominal:      General: There is no distension.      Palpations: Abdomen is soft.      Tenderness: There is no abdominal tenderness.   Musculoskeletal:      Cervical back: Neck supple.      Comments: No edema, cyanosis, or clubbing   Lymphadenopathy:      Cervical: No cervical adenopathy.   Skin:     General: Skin is warm and dry.   Neurological:      Mental Status: She is alert and oriented to person, place, and time.

## 2024-04-11 ENCOUNTER — OFFICE VISIT (OUTPATIENT)
Dept: PLASTIC SURGERY | Facility: CLINIC | Age: 69
End: 2024-04-11
Payer: MEDICARE

## 2024-04-11 VITALS
HEART RATE: 76 BPM | TEMPERATURE: 98.1 F | WEIGHT: 157.4 LBS | SYSTOLIC BLOOD PRESSURE: 158 MMHG | HEIGHT: 62 IN | BODY MASS INDEX: 28.97 KG/M2 | DIASTOLIC BLOOD PRESSURE: 93 MMHG

## 2024-04-11 DIAGNOSIS — D23.39 DERMOID CYST OF FOREHEAD: Primary | ICD-10-CM

## 2024-04-11 PROCEDURE — 99203 OFFICE O/P NEW LOW 30 MIN: CPT | Performed by: STUDENT IN AN ORGANIZED HEALTH CARE EDUCATION/TRAINING PROGRAM

## 2024-04-11 NOTE — PROGRESS NOTES
"Plastic Surgery Consult    Reason for visit: forehead cyst    HPI from 4/11/24  Patient is a pleasant 67 y/o female who presents with cystic forehead lesion that has drained in the past and is causing her pain and irritation. She has had it for a few months. She would like it treated.    Of note, patient has COPD and is on aspirin due to carotid stenosis and has significant cardiac history.    ROS: 12 pt ROS negative, except as otherwise noted in HPI    Past Medical History:   Diagnosis Date    Acid reflux     Anxiety     Arthritis     Cancer (HCC)     skin on chin    Cardiac disease     Chronic kidney disease     (R) kidney smaller than (L),  kidney stones    Chronic UTI (urinary tract infection)     Colitis     microscopic    COPD (chronic obstructive pulmonary disease) ([facility])     mild, recent dx    Depression     Disease of thyroid gland     \"pt reports nodules\"    Epidermal cyst of face     last assessed 10-    GERD (gastroesophageal reflux disease)     Heart attack ([facility]) 09/20/2018    [place] (hard of hearing)     deaf (L) ear, decreased hearing (R) ear    Hyperlipemia     diet controlled    Hyperlipidemia     Hypertension     IBS (irritable bowel syndrome)     Malignant neoplasm of skin     Meniere disease     Migraine     Myocardial infarction ([facility]) 09/2018    Numbness and tingling of left side of face     Sleep apnea     Uterine leiomyoma        FamHx: non-contrib  SurgHx: BCC of left chin, bilateral cataracts, no other facial surgery  SocHx: 1 PPD chronic smoker  Meds: aspirin, no steroids  Allergies   Allergen Reactions    Allegra [Fexofenadine] Other (See Comments)     Per allergist pt states she was advised not to take any antihistamines in 2018    Erythromycin Hives and Itching    Nizatidine Other (See Comments)     Severe chest pain  Severe chest pain    Penicillins Hives and Itching     Rash, hives    Prilosec [Omeprazole] Other (See Comments)     Had heart attack, advised by allergist not to take " any pump inhibitors and no antihistamines    Wellbutrin [Bupropion] Other (See Comments)     Elevated  / 110    Ciprofloxacin Hcl Hives    Fish-Derived Products - Food Allergy GI Intolerance     Tuna fish & all shell fish    Other Hives     allegic to all cillans and all mycins    Ampicillin     Benadryl [Diphenhydramine Hcl] Other (See Comments)     unknown    Ciprofloxacin      Severe hives, sob?    Clindamycin     Diphenhydramine      Facial swelling ?    Doxycycline     Lovastatin      Muscle aches  Muscle aches    Nickel Other (See Comments)     Throat swelling/itching    Shellfish-Derived Products - Food Allergy GI Intolerance     Pt unsure         PE:    Vitals:    04/11/24 1257   BP: 158/93   Pulse: 76   Temp: 98.1 °F (36.7 °C)       General: NC/AT, breathing comfortably on RA  Neuro: CN II-XII grossly intact, symmetric reflexes  HEENT: PERRLA, EOMI, external ears normal, no lesions or deformities, neck supple, trachea midline  Respiratory: CTAB, normal respiratory effort  Cardio: RRR, normal S1, S2, no murmur, rubs, gallops  GI: soft, non-tender, non-distended  Extremities/MSK: normal alignment, mobility, gait, no edema  Skin: no rashes, lesions, subcutaneous nodules    Central glabellum with scar and enlarged punctum, no expressible drainage    A/P: 67 y/o female who presents with symptomatic draining cyst from central glabellar region.  -Patient has chronic smoking history, cardiac history, and on aspirin.  -I discussed excision in clinic with complex closure. I discussed staying on the aspirin as the benefits outweigh the risks of bleeding.  -I discussed the procedure in terms of scarring and damage to nerves/vasculature. Patient acknowledged.  -Will call patient to schedule  -Instructed to minimize smoking  -Spent 30 minutes in consultation with patient. Greater than 50% of the total time was spent obtaining history, evaluation, performing exam, discussion of management options including  post-operative care, answering patient's questions and concerns, chart reviewing, and documentation          Jamir Maria MD   [facility]astic [facility] Re[facility] Surgery   [address], [address]   [place] [address]   Office: [phone]

## 2024-04-12 ENCOUNTER — APPOINTMENT (EMERGENCY)
Dept: RADIOLOGY | Facility: HOSPITAL | Age: 69
End: 2024-04-12
Payer: MEDICARE

## 2024-04-12 ENCOUNTER — HOSPITAL ENCOUNTER (EMERGENCY)
Facility: HOSPITAL | Age: 69
Discharge: HOME | End: 2024-04-12
Attending: EMERGENCY MEDICINE
Payer: MEDICARE

## 2024-04-12 VITALS
SYSTOLIC BLOOD PRESSURE: 135 MMHG | RESPIRATION RATE: 20 BRPM | TEMPERATURE: 97.9 F | OXYGEN SATURATION: 99 % | WEIGHT: 156 LBS | DIASTOLIC BLOOD PRESSURE: 64 MMHG | HEART RATE: 62 BPM | BODY MASS INDEX: 28.53 KG/M2

## 2024-04-12 DIAGNOSIS — F41.9 ANXIETY: ICD-10-CM

## 2024-04-12 DIAGNOSIS — R42 LIGHTHEADEDNESS: Primary | ICD-10-CM

## 2024-04-12 LAB
2HR DELTA HS TROPONIN: <0 NG/L
ALBUMIN SERPL BCP-MCNC: 4.2 G/DL (ref 3.5–5)
ALP SERPL-CCNC: 59 U/L (ref 34–104)
ALT SERPL W P-5'-P-CCNC: 18 U/L (ref 7–52)
ANION GAP SERPL CALCULATED.3IONS-SCNC: 6 MMOL/L (ref 4–13)
APTT PPP: 39 SECONDS (ref 23–37)
AST SERPL W P-5'-P-CCNC: 17 U/L (ref 13–39)
ATRIAL RATE: 80 BPM
BASOPHILS # BLD AUTO: 0.06 THOUSANDS/ÂΜL (ref 0–0.1)
BASOPHILS NFR BLD AUTO: 1 % (ref 0–1)
BILIRUB SERPL-MCNC: 0.61 MG/DL (ref 0.2–1)
BILIRUB UR QL STRIP: NEGATIVE
BNP SERPL-MCNC: 47 PG/ML (ref 0–100)
BUN SERPL-MCNC: 10 MG/DL (ref 5–25)
CALCIUM SERPL-MCNC: 9.3 MG/DL (ref 8.4–10.2)
CARDIAC TROPONIN I PNL SERPL HS: 2 NG/L
CARDIAC TROPONIN I PNL SERPL HS: <2 NG/L
CHLORIDE SERPL-SCNC: 107 MMOL/L (ref 96–108)
CLARITY UR: CLEAR
CO2 SERPL-SCNC: 27 MMOL/L (ref 21–32)
COLOR UR: NORMAL
CREAT SERPL-MCNC: 0.86 MG/DL (ref 0.6–1.3)
EOSINOPHIL # BLD AUTO: 0.25 THOUSAND/ÂΜL (ref 0–0.61)
EOSINOPHIL NFR BLD AUTO: 3 % (ref 0–6)
ERYTHROCYTE [DISTWIDTH] IN BLOOD BY AUTOMATED COUNT: 13.2 % (ref 11.6–15.1)
GFR SERPL CREATININE-BSD FRML MDRD: 70 ML/MIN/1.73SQ M
GLUCOSE SERPL-MCNC: 103 MG/DL (ref 65–140)
GLUCOSE UR STRIP-MCNC: NEGATIVE MG/DL
HCT VFR BLD AUTO: 40.7 % (ref 34.8–46.1)
HGB BLD-MCNC: 13.2 G/DL (ref 11.5–15.4)
HGB UR QL STRIP.AUTO: NEGATIVE
IMM GRANULOCYTES # BLD AUTO: 0.05 THOUSAND/UL (ref 0–0.2)
IMM GRANULOCYTES NFR BLD AUTO: 1 % (ref 0–2)
INR PPP: 1.02 (ref 0.84–1.19)
KETONES UR STRIP-MCNC: NEGATIVE MG/DL
LEUKOCYTE ESTERASE UR QL STRIP: NEGATIVE
LYMPHOCYTES # BLD AUTO: 1.2 THOUSANDS/ÂΜL (ref 0.6–4.47)
LYMPHOCYTES NFR BLD AUTO: 14 % (ref 14–44)
MCH RBC QN AUTO: 29.8 PG (ref 26.8–34.3)
MCHC RBC AUTO-ENTMCNC: 32.4 G/DL (ref 31.4–37.4)
MCV RBC AUTO: 92 FL (ref 82–98)
MONOCYTES # BLD AUTO: 0.64 THOUSAND/ÂΜL (ref 0.17–1.22)
MONOCYTES NFR BLD AUTO: 8 % (ref 4–12)
NEUTROPHILS # BLD AUTO: 6.38 THOUSANDS/ÂΜL (ref 1.85–7.62)
NEUTS SEG NFR BLD AUTO: 73 % (ref 43–75)
NITRITE UR QL STRIP: NEGATIVE
NRBC BLD AUTO-RTO: 0 /100 WBCS
P AXIS: 69 DEGREES
PH UR STRIP.AUTO: 6.5 [PH]
PLATELET # BLD AUTO: 224 THOUSANDS/UL (ref 149–390)
PMV BLD AUTO: 9.4 FL (ref 8.9–12.7)
POTASSIUM SERPL-SCNC: 4.3 MMOL/L (ref 3.5–5.3)
PR INTERVAL: 144 MS
PROT SERPL-MCNC: 6.3 G/DL (ref 6.4–8.4)
PROT UR STRIP-MCNC: NEGATIVE MG/DL
PROTHROMBIN TIME: 13.6 SECONDS (ref 11.6–14.5)
QRS AXIS: 47 DEGREES
QRSD INTERVAL: 78 MS
QT INTERVAL: 358 MS
QTC INTERVAL: 412 MS
RBC # BLD AUTO: 4.43 MILLION/UL (ref 3.81–5.12)
SODIUM SERPL-SCNC: 140 MMOL/L (ref 135–147)
SP GR UR STRIP.AUTO: <=1.005 (ref 1–1.03)
T WAVE AXIS: 36 DEGREES
UROBILINOGEN UR QL STRIP.AUTO: 0.2 E.U./DL
VENTRICULAR RATE: 80 BPM
WBC # BLD AUTO: 8.58 THOUSAND/UL (ref 4.31–10.16)

## 2024-04-12 PROCEDURE — 80053 COMPREHEN METABOLIC PANEL: CPT | Performed by: EMERGENCY MEDICINE

## 2024-04-12 PROCEDURE — 85610 PROTHROMBIN TIME: CPT | Performed by: EMERGENCY MEDICINE

## 2024-04-12 PROCEDURE — 81003 URINALYSIS AUTO W/O SCOPE: CPT | Performed by: EMERGENCY MEDICINE

## 2024-04-12 PROCEDURE — 99284 EMERGENCY DEPT VISIT MOD MDM: CPT | Performed by: EMERGENCY MEDICINE

## 2024-04-12 PROCEDURE — 84484 ASSAY OF TROPONIN QUANT: CPT | Performed by: EMERGENCY MEDICINE

## 2024-04-12 PROCEDURE — 71045 X-RAY EXAM CHEST 1 VIEW: CPT

## 2024-04-12 PROCEDURE — 93010 ELECTROCARDIOGRAM REPORT: CPT | Performed by: INTERNAL MEDICINE

## 2024-04-12 PROCEDURE — 96360 HYDRATION IV INFUSION INIT: CPT

## 2024-04-12 PROCEDURE — 85730 THROMBOPLASTIN TIME PARTIAL: CPT | Performed by: EMERGENCY MEDICINE

## 2024-04-12 PROCEDURE — 83880 ASSAY OF NATRIURETIC PEPTIDE: CPT | Performed by: EMERGENCY MEDICINE

## 2024-04-12 PROCEDURE — 99284 EMERGENCY DEPT VISIT MOD MDM: CPT

## 2024-04-12 PROCEDURE — 93005 ELECTROCARDIOGRAM TRACING: CPT

## 2024-04-12 PROCEDURE — 85025 COMPLETE CBC W/AUTO DIFF WBC: CPT | Performed by: EMERGENCY MEDICINE

## 2024-04-12 PROCEDURE — 36415 COLL VENOUS BLD VENIPUNCTURE: CPT | Performed by: EMERGENCY MEDICINE

## 2024-04-12 RX ADMIN — SODIUM CHLORIDE 1000 ML: 0.9 INJECTION, SOLUTION INTRAVENOUS at 07:41

## 2024-04-12 NOTE — ED PROVIDER NOTES
History  No chief complaint on file.    HPI    Prior to Admission Medications   Prescriptions Last Dose Informant Patient Reported? Taking?   LORazepam (ATIVAN) 0.5 mg tablet   Yes No   Sig: Take 0.5 mg by mouth 2 (two) times a day   Nexlizet 180-10 MG TABS   Yes No   Sig: Take 1 tablet by mouth daily at bedtime   aspirin 81 mg chewable tablet  Self Yes No   Sig: Chew 81 mg daily   ezetimibe (ZETIA) 10 mg tablet   Yes No   Patient not taking: Reported on 4/10/2024   hydrOXYzine HCL (ATARAX) 25 mg tablet   No No   Sig: Take 2 tablets (50 mg total) by mouth every 6 (six) hours as needed for anxiety for up to 20 doses Take 1 to 2 pills total every 6 hours as needed for anxiousness.   Patient not taking: Reported on 3/5/2024   ketorolac (ACULAR) 0.5 % ophthalmic solution   No No   Sig: Administer 1 drop to the right eye 4 (four) times a day   Patient not taking: Reported on 4/10/2024   labetalol (NORMODYNE) 100 mg tablet   Yes No   Sig: Take 100 mg by mouth 2 (two) times a day   nicotine (NICODERM CQ) 21 mg/24 hr TD 24 hr patch   No No   Sig: Place 1 patch on the skin daily   ondansetron (Zofran ODT) 4 mg disintegrating tablet   No No   Sig: Take 1 tablet (4 mg total) by mouth every 6 (six) hours as needed for nausea or vomiting   Patient not taking: Reported on 3/4/2024   prednisoLONE acetate (PRED FORTE) 1 % ophthalmic suspension   No No   Sig: Administer 1 drop to the right eye 4 (four) times a day   Patient not taking: Reported on 4/10/2024   rosuvastatin (CRESTOR) 5 mg tablet   Yes No   Sig: Take 5 mg by mouth daily at bedtime      Facility-Administered Medications: None       Past Medical History:   Diagnosis Date   • Acid reflux    • Anxiety    • Arthritis    • Cancer (HCC)     skin on chin   • Cardiac disease    • Chronic kidney disease     (R) kidney smaller than (L),  kidney stones   • Chronic UTI (urinary tract infection)    • Colitis     microscopic   • COPD (chronic obstructive pulmonary disease) (HCC)      "mild, recent dx   • Depression    • Disease of thyroid gland     \"pt reports nodules\"   • Epidermal cyst of face     last assessed 10-   • GERD (gastroesophageal reflux disease)    • Heart attack (HCC) 09/20/2018   • [place] (hard of hearing)     deaf (L) ear, decreased hearing (R) ear   • Hyperlipemia     diet controlled   • Hyperlipidemia    • Hypertension    • IBS (irritable bowel syndrome)    • Malignant neoplasm of skin    • Meniere disease    • Migraine    • Myocardial infarction (HCC) 09/2018   • Numbness and tingling of left side of face    • Sleep apnea    • Uterine leiomyoma        Past Surgical History:   Procedure Laterality Date   • CARDIAC CATHETERIZATION      9/21/18 [facility] - severe CAD of the LAD and diagonal artery   • COLONOSCOPY N/A 11/4/2016    Procedure: COLONOSCOPY;  Surgeon: Perry Miles MD;  Location: [facility] GI LAB;  Service:    • ERCP     • ESOPHAGOGASTRODUODENOSCOPY N/A 11/4/2016    Procedure: ESOPHAGOGASTRODUODENOSCOPY (EGD);  Surgeon: Perry Miles MD;  Location: [facility] GI LAB;  Service:    • EYE SURGERY     • [facility] XCAPSL CTRC RMVL INSJ IO LENS PROSTH W/O ECP Left 3/7/2016    Procedure: EXTRACTION EXTRACAPSULAR CATARACT PHACO INTRAOCULAR LENS (IOL);  Surgeon: Huy Blancas MD;  Location: [facility] MAIN OR;  Service: Ophthalmology   • VA XCAPSL CTRC RMVL INSJ IO LENS PROSTH W/O ECP Right 3/11/2024    Procedure: EXTRACTION EXTRACAPSULAR CATARACT PHACO INTRAOCULAR LENS (IOL);  Surgeon: Huy Blancas MD;  Location: [facility] MAIN OR;  Service: Ophthalmology   • SKIN CANCER EXCISION      excision skin cancer on chin       Family History   Problem Relation Age of Onset   • Heart disease Mother         cardiac disorder   • Lung cancer Mother    • COPD Mother    • Brain cancer Father    • Colonic polyp Sister    • Thyroid cancer Sister    • Skin cancer Brother    • Skin cancer Brother    • Diabetes Son    • Neuropathy Son    • Multiple sclerosis Son    • No Known Problems Son    • Colon " cancer Maternal Grandmother    • Breast cancer Cousin 20     I have reviewed and agree with the history as documented.    E-Cigarette/Vaping   • E-Cigarette Use Never User      E-Cigarette/Vaping Substances   • Nicotine No    • THC No    • CBD No    • Flavoring No    • Other No    • Unknown No      Social History     Tobacco Use   • Smoking status: Every Day     Current packs/day: 1.00     Average packs/day: 1 pack/day for 50.0 years (50.0 ttl pk-yrs)     Types: Cigarettes   • Smokeless tobacco: Never   • Tobacco comments:     52+ years per allscripts, still smoking every day, removes patch to smoke   Vaping Use   • Vaping status: Never Used   Substance Use Topics   • Alcohol use: No   • Drug use: No       Review of Systems    Physical Exam  Physical Exam    Vital Signs  ED Triage Vitals   Temp Pulse Resp BP SpO2   -- -- -- -- --      Temp src Heart Rate Source Patient Position - Orthostatic VS BP Location FiO2 (%)   -- -- -- -- --      Pain Score       --           There were no vitals filed for this visit.      Visual Acuity      ED Medications  Medications - No data to display    Diagnostic Studies  Results Reviewed       None                   No orders to display              Procedures  Procedures         ED Course                                             Medical Decision Making           Disposition  Final diagnoses:   None     ED Disposition       None          Follow-up Information    None         Patient's Medications   Discharge Prescriptions    No medications on file       No discharge procedures on file.    PDMP Review       None            ED Provider  Electronically Signed by           nervous/anxious.    All other systems reviewed and are negative.      Physical Exam  Physical Exam  Vitals and nursing note reviewed.   Constitutional:       Appearance: Normal appearance.   HENT:      Head: Normocephalic.      Right Ear: Tympanic membrane and external ear normal.      Left Ear: Tympanic membrane and external ear normal.      Nose: Nose normal.      Mouth/Throat:      Mouth: Mucous membranes are moist.   Eyes:      Extraocular Movements: Extraocular movements intact.      Conjunctiva/sclera: Conjunctivae normal.   Cardiovascular:      Rate and Rhythm: Normal rate and regular rhythm.      Pulses: Normal pulses.   Pulmonary:      Effort: Pulmonary effort is normal.      Breath sounds: Normal breath sounds.   Abdominal:      Palpations: Abdomen is soft.      Tenderness: There is no abdominal tenderness.   Musculoskeletal:         General: Normal range of motion.      Cervical back: Normal range of motion and neck supple.   Skin:     General: Skin is warm and dry.      Capillary Refill: Capillary refill takes less than 2 seconds.   Neurological:      General: No focal deficit present.      Mental Status: She is alert and oriented to person, place, and time.      Cranial Nerves: No cranial nerve deficit.      Sensory: No sensory deficit.      Motor: No weakness.   Psychiatric:         Behavior: Behavior normal.      Comments: Anxious mood         Vital Signs  ED Triage Vitals [04/12/24 0720]   Temperature Pulse Respirations Blood Pressure SpO2   97.9 °F (36.6 °C) 78 19 (!) 184/86 98 %      Temp Source Heart Rate Source Patient Position - Orthostatic VS BP Location FiO2 (%)   Tympanic Monitor Sitting Right arm --      Pain Score       No Pain           Vitals:    04/12/24 0800 04/12/24 0815 04/12/24 1015 04/12/24 1030   BP: (!) 131/106 165/70 135/64 135/64   Pulse: 64 62 64 62   Patient Position - Orthostatic VS: Lying Lying Lying          Visual Acuity  Visual Acuity      Flowsheet Row Most Recent Value    L Pupil Size (mm) 3   R Pupil Size (mm) 3            ED Medications  Medications   sodium chloride 0.9 % bolus 1,000 mL (0 mL Intravenous Stopped 4/12/24 0841)       Diagnostic Studies  Results Reviewed       Procedure Component Value Units Date/Time    HS Troponin I 2hr [[id number]]  (Normal) Collected: 04/12/24 0944    Lab Status: Final result Specimen: Blood from Arm, Right Updated: 04/12/24 1020     hs TnI 2hr <2 ng/L      Delta 2hr hsTnI <0 ng/L     UA (URINE) with reflex to Scope [[id number]] Collected: 04/12/24 0836    Lab Status: Final result Specimen: Urine, Clean Catch Updated: 04/12/24 0856     Color, UA Light Yellow     Clarity, UA Clear     Specific Gravity, UA <=1.005     pH, UA 6.5     Leukocytes, UA Negative     Nitrite, UA Negative     Protein, UA Negative mg/dl      Glucose, UA Negative mg/dl      Ketones, UA Negative mg/dl      Urobilinogen, UA 0.2 E.U./dl      Bilirubin, UA Negative     Occult Blood, UA Negative    HS Troponin 0hr (reflex protocol) [[id number]]  (Normal) Collected: 04/12/24 0742    Lab Status: Final result Specimen: Blood from Arm, Right Updated: 04/12/24 0811     hs TnI 0hr 2 ng/L     B-Type Natriuretic Peptide(BNP) [[id number]]  (Normal) Collected: 04/12/24 0742    Lab Status: Final result Specimen: Blood from Arm, Right Updated: 04/12/24 0810     BNP 47 pg/mL     Comprehensive metabolic panel [[id number]]  (Abnormal) Collected: 04/12/24 0742    Lab Status: Final result Specimen: Blood from Arm, Right Updated: 04/12/24 0810     Sodium 140 mmol/L      Potassium 4.3 mmol/L      Chloride 107 mmol/L      CO2 27 mmol/L      ANION GAP 6 mmol/L      BUN 10 mg/dL      Creatinine 0.86 mg/dL      Glucose 103 mg/dL      Calcium 9.3 mg/dL      AST 17 U/L      ALT 18 U/L      Alkaline Phosphatase 59 U/L      Total Protein 6.3 g/dL      Albumin 4.2 g/dL      Total Bilirubin 0.61 mg/dL      eGFR 70 ml/min/1.73sq m     Narrative:      National Kid[employer] Disease Foundation guidelines for Chronic  Kidney Disease (CKD):     Stage 1 with normal or high GFR (GFR > 90 mL/min/1.73 square meters)    Stage 2 Mild CKD (GFR = 60-89 mL/min/1.73 square meters)    Stage 3A Moderate CKD (GFR = 45-59 mL/min/1.73 square meters)    Stage 3B Moderate CKD (GFR = 30-44 mL/min/1.73 square meters)    Stage 4 Severe CKD (GFR = 15-29 mL/min/1.73 square meters)    Stage 5 End Stage CKD (GFR <15 mL/min/1.73 square meters)  Note: GFR calculation is accurate only with a steady state creatinine    Protime-INR [[id number]]  (Normal) Collected: 04/12/24 0742    Lab Status: Final result Specimen: Blood from Arm, Right Updated: 04/12/24 0804     Protime 13.6 seconds      INR 1.02    APTT [[id number]]  (Abnormal) Collected: 04/12/24 0742    Lab Status: Final result Specimen: Blood from Arm, Right Updated: 04/12/24 0804     PTT 39 seconds     CBC and differential [[id number]] Collected: 04/12/24 0742    Lab Status: Final result Specimen: Blood from Arm, Right Updated: 04/12/24 0749     WBC 8.58 Thousand/uL      RBC 4.43 Million/uL      Hemoglobin 13.2 g/dL      Hematocrit 40.7 %      MCV 92 fL      MCH 29.8 pg      MCHC 32.4 g/dL      RDW 13.2 %      MPV 9.4 fL      Platelets 224 Thousands/uL      nRBC 0 /100 WBCs      Segmented % 73 %      Immature Grans % 1 %      Lymphocytes % 14 %      Monocytes % 8 %      Eosinophils Relative 3 %      Basophils Relative 1 %      Absolute Neutrophils 6.38 Thousands/µL      Absolute Immature Grans 0.05 Thousand/uL      Absolute Lymphocytes 1.20 Thousands/µL      Absolute Monocytes 0.64 Thousand/µL      Eosinophils Absolute 0.25 Thousand/µL      Basophils Absolute 0.06 Thousands/µL                    XR chest 1 view portable   Final Result by Miguel Avery MD (04/12 6011)      Stable 1.4 cm right lower lobe pulmonary nodule. No acute findings.            Workstation performed: [id number]                    Procedures  Procedures         ED Course                                             Medical  Decision Making  Right headedness and sleep disturbance in a patient with coronary artery disease.  Will check cardiac profile    Amount and/or Complexity of Data Reviewed  Labs: ordered.  Radiology: ordered.             Disposition  Final diagnoses:   Lightheadedness   Anxiety     Time reflects when diagnosis was documented in both MDM as applicable and the Disposition within this note       Time User Action Codes Description Comment    4/12/2024 10:34 AM Jackson Cronin Add [R42] Dizziness     4/12/2024 10:34 AM Jackson Cronin Add [R42] Lightheadedness     4/12/2024 10:34 AM Jackson Cronin Modify [R42] Lightheadedness     4/12/2024 10:34 AM Jackson Cronin Remove [R42] Dizziness     4/12/2024 10:34 AM Jackson Cronin Add [F41.9] Anxiety           ED Disposition       ED Disposition   Discharge    Condition   Stable    Date/Time   Fri Apr 12, 2024 10:34 AM    Comment   Maylin Aranda discharge to home/self care.                   Follow-up Information       Follow up With Specialties Details Why Contact Info    Brody Gould MD Internal Medicine Schedule an appointment as soon as possible for a visit   [address] [facility] [id number]  [phone]              Discharge Medication List as of 4/12/2024 10:34 AM        CONTINUE these medications which have NOT CHANGED    Details   aspirin 81 mg chewable tablet Chew 81 mg daily, Historical Med      ezetimibe (ZETIA) 10 mg tablet Historical Med      hydrOXYzine HCL (ATARAX) 25 mg tablet Take 2 tablets (50 mg total) by mouth every 6 (six) hours as needed for anxiety for up to 20 doses Take 1 to 2 pills total every 6 hours as needed for anxiousness., Starting Wed 10/18/2023, Normal      ketorolac (ACULAR) 0.5 % ophthalmic solution Administer 1 drop to the right eye 4 (four) times a day, Starting Mon 3/11/2024, No Print      labetalol (NORMODYNE) 100 mg tablet Take 100 mg by mouth 2 (two) times a day, Starting Tue 3/28/2023, Historical Med       LORazepam (ATIVAN) 0.5 mg tablet Take 0.5 mg by mouth 2 (two) times a day, Starting Tue 3/28/2023, Historical Med      Nexlizet 180-10 MG TABS Take 1 tablet by mouth daily at bedtime, Historical Med      nicotine (NICODERM CQ) 21 mg/24 hr TD 24 hr patch Place 1 patch on the skin daily, Starting Sat 9/17/2022, Normal      ondansetron (Zofran ODT) 4 mg disintegrating tablet Take 1 tablet (4 mg total) by mouth every 6 (six) hours as needed for nausea or vomiting, Starting Wed 10/18/2023, Normal      prednisoLONE acetate (PRED FORTE) 1 % ophthalmic suspension Administer 1 drop to the right eye 4 (four) times a day, Starting Mon 3/11/2024, No Print      rosuvastatin (CRESTOR) 5 mg tablet Take 5 mg by mouth daily at bedtime, Starting Sun 12/24/2023, Historical Med             No discharge procedures on file.    PDMP Review       None            ED Provider  Electronically Signed by             Jackson Cronin MD  04/18/24 6032

## 2024-04-12 NOTE — ASSESSMENT & PLAN NOTE
Maylin does have lung nodules.  Has stable 1.4 cm right lower lobe lung nodule which appears benign.  This has been remaining stable in size for several years.  Most recent CT scan of chest on 2/5/2024 showed some minimal enlargement of 6 mm right lower lobe lung nodule.  It now measures 6 mm previously was 5 mm.  There are some subcentimeter groundglass infiltrates bilaterally.    I did order a follow-up CT scan of chest to be done in 3 months or May of this year.  Since she lives a long distance from here she can call me to review results with her.

## 2024-05-02 ENCOUNTER — TELEPHONE (OUTPATIENT)
Age: 69
End: 2024-05-02

## 2024-05-02 NOTE — TELEPHONE ENCOUNTER
Patient called requesting an update ; she was told that the office will start a Prior authorization for dermoid cyst removal .

## 2024-05-08 ENCOUNTER — HOSPITAL ENCOUNTER (OUTPATIENT)
Dept: CT IMAGING | Facility: HOSPITAL | Age: 69
Discharge: HOME | End: 2024-05-08
Attending: INTERNAL MEDICINE
Payer: MEDICARE

## 2024-05-08 DIAGNOSIS — R91.8 LUNG NODULES: ICD-10-CM

## 2024-05-08 PROCEDURE — 71250 CT THORAX DX C-: CPT

## 2024-05-13 ENCOUNTER — OFFICE VISIT (OUTPATIENT)
Dept: FAMILY MEDICINE CLINIC | Facility: CLINIC | Age: 69
End: 2024-05-13
Payer: MEDICARE

## 2024-05-13 VITALS
SYSTOLIC BLOOD PRESSURE: 148 MMHG | OXYGEN SATURATION: 97 % | WEIGHT: 157 LBS | DIASTOLIC BLOOD PRESSURE: 72 MMHG | HEIGHT: 62 IN | HEART RATE: 92 BPM | RESPIRATION RATE: 16 BRPM | BODY MASS INDEX: 28.89 KG/M2

## 2024-05-13 DIAGNOSIS — H93.11 TINNITUS AURIUM, RIGHT: Primary | ICD-10-CM

## 2024-05-13 DIAGNOSIS — Z12.31 ENCOUNTER FOR SCREENING MAMMOGRAM FOR BREAST CANCER: ICD-10-CM

## 2024-05-13 DIAGNOSIS — R91.8 LUNG NODULES: ICD-10-CM

## 2024-05-13 DIAGNOSIS — I70.0 AORTIC ATHEROSCLEROSIS (HCC): ICD-10-CM

## 2024-05-13 PROBLEM — H93.12 TINNITUS AURIUM, LEFT: Status: ACTIVE | Noted: 2024-05-13

## 2024-05-13 PROCEDURE — 99214 OFFICE O/P EST MOD 30 MIN: CPT | Performed by: FAMILY MEDICINE

## 2024-05-13 PROCEDURE — G2211 COMPLEX E/M VISIT ADD ON: HCPCS | Performed by: FAMILY MEDICINE

## 2024-05-13 NOTE — PROGRESS NOTES
Name: Maylin Aranda      : 1955      MRN: [id number]  Encounter Provider: Liliam Malloy MD  Encounter Date: 2024   Encounter department: [facility]    Assessment & Plan     1. Tinnitus aurium, right  -     Ambulatory Referral to Otolaryngology; Future  There is a small amount of wax in the right ear could be due to that she is hearing noises when she lays down, she has appointment with ENT next week, she will be evaluated  2. Encounter for screening mammogram for breast cancer  -     Mammo screening bilateral w 3d & cad; Future    3. Aortic atherosclerosis (HCC)  Assessment & Plan:  She follows cardiologist, she has coronary artery disease      4. Lung nodules  Assessment & Plan:  She had recent CAT scan the result is pending she is following pulmonology      Discussed about quitting smoking but she has tried multiple times in the past and not able to quit  Follows pulmonologist and they are following a changing nodule in her lung       Subjective     She is a 1 pack smoker for long time, she follows pulmonologist for COPD, lung nodule and she has recent follow-up CAT scan for the lung nodule is increasing, she has tried multiple times to quit smoking but she is unable to quit,  She wants to have order for mammogram, she complains of having pulsation in her right ear for 1 month which is not getting better she was 1 time seen in the ER for the same reason,  She has made appointment with the ENT next week and she needs a referral,  She only feels this pulsation when she is laying down when she is up she does not feel those,  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    HENT:  Positive for tinnitus.    Eyes: Negative.    Respiratory:          She has smoker's cough and slight wheezing   Cardiovascular: Negative.    Endocrine: Negative.    Skin: Negative.        Past Medical History:   Diagnosis Date   •  "Acid reflux    • Anxiety    • Arthritis    • Cancer (HCC)     skin on chin   • Cardiac disease    • Chronic kidney disease     (R) kidney smaller than (L),  kidney stones   • Chronic UTI (urinary tract infection)    • Colitis     microscopic   • COPD (chronic obstructive pulmonary disease) (HCC)     mild, recent dx   • Depression    • Disease of thyroid gland     \"pt reports nodules\"   • Epidermal cyst of face     last assessed 10-   • GERD (gastroesophageal reflux disease)    • Heart attack (HCC) 09/20/2018   • [place] (hard of hearing)     deaf (L) ear, decreased hearing (R) ear   • Hyperlipemia     diet controlled   • Hyperlipidemia    • Hypertension    • IBS (irritable bowel syndrome)    • Malignant neoplasm of skin    • Meniere disease    • Migraine    • Myocardial infarction ([facility]) 09/2018   • Numbness and tingling of left side of face    • Sleep apnea    • Uterine leiomyoma      Past Surgical History:   Procedure Laterality Date   • CARDIAC CATHETERIZATION      9/21/18 [facility] - severe CAD of the LAD and diagonal artery   • COLONOSCOPY N/A 11/4/2016    Procedure: COLONOSCOPY;  Surgeon: Perry Miles MD;  Location: [facility] GI LAB;  Service:    • ERCP     • ESOPHAGOGASTRODUODENOSCOPY N/A 11/4/2016    Procedure: ESOPHAGOGASTRODUODENOSCOPY (EGD);  Surgeon: Perry Miles MD;  Location: [facility] GI LAB;  Service:    • EYE SURGERY     • IL XCAPSL CTRC RMVL INSJ IO LENS PROSTH W/O ECP Left 3/7/2016    Procedure: EXTRACTION EXTRACAPSULAR CATARACT PHACO INTRAOCULAR LENS (IOL);  Surgeon: Huy Blancas MD;  Location: [facility] MAIN OR;  Service: Ophthalmology   • IL XCAPSL CTRC RMVL INSJ IO LENS PROSTH W/O ECP Right 3/11/2024    Procedure: EXTRACTION EXTRACAPSULAR CATARACT PHACO INTRAOCULAR LENS (IOL);  Surgeon: Huy Blancas MD;  Location: [facility] MAIN OR;  Service: Ophthalmology   • SKIN CANCER EXCISION      excision skin cancer on chin     Family History   Problem Relation Age of Onset   • Heart disease Mother  "        cardiac disorder   • Lung cancer Mother    • COPD Mother    • Brain cancer Father    • Colonic polyp Sister    • Thyroid cancer Sister    • Skin cancer Brother    • Skin cancer Brother    • Diabetes Son    • Neuropathy Son    • Multiple sclerosis Son    • No Known Problems Son    • Colon cancer Maternal Grandmother    • Breast cancer Cousin 20     Social History     Socioeconomic History   • Marital status: Single     Spouse name: None   • Number of children: None   • Years of education: None   • Highest education level: None   Occupational History   • None   Tobacco Use   • Smoking status: Every Day     Current packs/day: 1.00     Average packs/day: 1 pack/day for 50.0 years (50.0 ttl pk-yrs)     Types: Cigarettes   • Smokeless tobacco: Never   • Tobacco comments:     52+ years per allscripts, still smoking every day, removes patch to smoke   Vaping Use   • Vaping status: Never Used   Substance and Sexual Activity   • Alcohol use: No   • Drug use: No   • Sexual activity: None   Other Topics Concern   • None   Social History Narrative    Always uses seatlbelt     Social Determinants of Health     Financial Resource Strain: Low Risk  (4/14/2023)    Overall Financial Resource Strain (CARDIA)    • Difficulty of Paying Living Expenses: Not very hard   Food Insecurity: Food Insecurity Present (12/11/2023)    Received from [facility]    Hunger Vital Sign    • Worried About Running Out of Food in the Last Year: Sometimes true    • Ran Out of Food in the Last Year: Sometimes true   Transportation Needs: No Transportation Needs (4/14/2023)    PRAPARE - Transportation    • Lack of Transportation (Medical): No    • Lack of Transportation (Non-Medical): No   Physical Activity: Not on file   Stress: Not on file   Social Connections: Not on file   Intimate Partner Violence: Not on file   Housing Stability: Not on file     Current Outpatient Medications on File Prior to Visit   Medication Sig   • aspirin 81 mg chewable  tablet Chew 81 mg daily   • ketorolac (ACULAR) 0.5 % ophthalmic solution Administer 1 drop to the right eye 4 (four) times a day (Patient not taking: Reported on 4/10/2024)   • labetalol (NORMODYNE) 100 mg tablet Take 100 mg by mouth 2 (two) times a day   • LORazepam (ATIVAN) 0.5 mg tablet Take 0.5 mg by mouth 2 (two) times a day   • Nexlizet 180-10 MG TABS Take 1 tablet by mouth daily at bedtime   • nicotine (NICODERM CQ) 21 mg/24 hr TD 24 hr patch Place 1 patch on the skin daily   • ondansetron (Zofran ODT) 4 mg disintegrating tablet Take 1 tablet (4 mg total) by mouth every 6 (six) hours as needed for nausea or vomiting (Patient not taking: Reported on 3/4/2024)   • prednisoLONE acetate (PRED FORTE) 1 % ophthalmic suspension Administer 1 drop to the right eye 4 (four) times a day (Patient not taking: Reported on 4/10/2024)   • rosuvastatin (CRESTOR) 5 mg tablet Take 5 mg by mouth daily at bedtime   • [DISCONTINUED] ezetimibe (ZETIA) 10 mg tablet  (Patient not taking: Reported on 4/10/2024)   • [DISCONTINUED] hydrOXYzine HCL (ATARAX) 25 mg tablet Take 2 tablets (50 mg total) by mouth every 6 (six) hours as needed for anxiety for up to 20 doses Take 1 to 2 pills total every 6 hours as needed for anxiousness. (Patient not taking: Reported on 3/5/2024)     Allergies   Allergen Reactions   • Allegra [Fexofenadine] Other (See Comments)     Per allergist pt states she was advised not to take any antihistamines in 2018   • Erythromycin Hives and Itching   • Nizatidine Other (See Comments)     Severe chest pain  Severe chest pain   • Penicillins Hives and Itching     Rash, hives   • Prilosec [Omeprazole] Other (See Comments)     Had heart attack, advised by allergist not to take any pump inhibitors and no antihistamines   • Wellbutrin [Bupropion] Other (See Comments)     Elevated  / 110   • Ciprofloxacin Hcl Hives   • Fish-Derived Products - Food Allergy GI Intolerance     Tuna fish & all shell fish   • Other Hives  "    allegic to all cillans and all mycins   • Ampicillin    • Benadryl [Diphenhydramine Hcl] Other (See Comments)     unknown   • Ciprofloxacin      Severe hives, sob?   • Clindamycin    • Diphenhydramine      Facial swelling ?   • Doxycycline    • Lovastatin      Muscle aches  Muscle aches   • Nickel Other (See Comments)     Throat swelling/itching   • Shellfish-Derived Products - Food Allergy GI Intolerance     Pt unsure       There is no immunization history on file for this patient.    Objective     /72   Pulse 92   Resp 16   Ht 5' 2\" (1.575 m)   Wt 71.2 kg (157 lb)   SpO2 97%   BMI 28.72 kg/m²     Physical Exam  Vitals and nursing note reviewed.   Constitutional:       Appearance: Normal appearance. She is well-developed. She is not ill-appearing.   HENT:      Right Ear: Tympanic membrane normal.      Left Ear: Tympanic membrane normal.      Ears:      Comments: Slight amount of wax in the right ear  Eyes:      Extraocular Movements: Extraocular movements intact.   Neck:      Thyroid: No thyromegaly.   Cardiovascular:      Rate and Rhythm: Normal rate and regular rhythm.      Heart sounds: Normal heart sounds. No murmur heard.  Pulmonary:      Effort: Pulmonary effort is normal.      Breath sounds: Wheezing present. No rales.   Musculoskeletal:      Cervical back: Normal range of motion and neck supple.   Skin:     Findings: No erythema or rash.   Neurological:      Mental Status: She is alert.       Liliam Malloy MD    "

## 2024-05-15 ENCOUNTER — TELEPHONE (OUTPATIENT)
Age: 69
End: 2024-05-15

## 2024-05-15 NOTE — TELEPHONE ENCOUNTER
Patient called bc she is scheduled w/ dr bustillo in June for an in office procedure and she is asking if there is anything sooner? Please advise

## 2024-06-11 ENCOUNTER — TELEPHONE (OUTPATIENT)
Age: 69
End: 2024-06-11

## 2024-06-26 ENCOUNTER — TELEPHONE (OUTPATIENT)
Age: 69
End: 2024-06-26

## 2024-06-26 NOTE — TELEPHONE ENCOUNTER
Patient would like  to give her call on her CT results  and she would also like for the doctor to place in a CT order for August . I offer patient to see [facility] or [place] . Patient decline and requested to see  , Patient is schedule to see  in Oct.

## 2024-07-01 DIAGNOSIS — R91.8 LUNG NODULES: Primary | ICD-10-CM

## 2024-07-01 NOTE — PROGRESS NOTES
I discussed results of CT scan of chest with Maylin.  No dramatic change in lung nodules.  I did order follow-up lung scan for 3 months or in this of this year.  Also I will try to make her follow-up appointment with me around August 14 15th that she will be back in town on those 2 days.  She now lives in [place] at least a couple hours away.

## 2024-07-05 ENCOUNTER — TELEPHONE (OUTPATIENT)
Age: 69
End: 2024-07-05

## 2024-07-05 NOTE — TELEPHONE ENCOUNTER
Patient cancelled appointment with Dr Maria 7/8 at 1130 online and needs to reschedule.    She does not need another consult but needs an actual excision as insurance approval was obtained.    Advised will have Marlys reach out to reschedule with her.

## 2024-08-05 ENCOUNTER — TELEPHONE (OUTPATIENT)
Dept: PODIATRY | Facility: CLINIC | Age: 69
End: 2024-08-05

## 2024-08-05 NOTE — TELEPHONE ENCOUNTER
Spoke to Pt about 8/12/24 appt with RUIZ Sheffield and would need to be rescheduled.    Pt stated they are in pain and need to be seen sooner than later.    Physical Therapy isn't working.    I told the patient that someone would be contacting them to get them in sooner.

## 2024-08-13 ENCOUNTER — HOSPITAL ENCOUNTER (OUTPATIENT)
Dept: RADIOLOGY | Facility: HOSPITAL | Age: 69
Discharge: HOME | End: 2024-08-13
Payer: MEDICARE

## 2024-08-13 ENCOUNTER — HOSPITAL ENCOUNTER (OUTPATIENT)
Dept: CT IMAGING | Facility: HOSPITAL | Age: 69
Discharge: HOME | End: 2024-08-13
Attending: INTERNAL MEDICINE
Payer: MEDICARE

## 2024-08-13 DIAGNOSIS — M47.816 LUMBAR SPONDYLOSIS: ICD-10-CM

## 2024-08-13 DIAGNOSIS — R91.8 LUNG NODULES: ICD-10-CM

## 2024-08-13 PROCEDURE — 72110 X-RAY EXAM L-2 SPINE 4/>VWS: CPT

## 2024-08-13 PROCEDURE — 71250 CT THORAX DX C-: CPT

## 2024-10-07 ENCOUNTER — OFFICE VISIT (OUTPATIENT)
Dept: PULMONOLOGY | Facility: MEDICAL CENTER | Age: 69
End: 2024-10-07
Payer: MEDICARE

## 2024-10-07 ENCOUNTER — PROCEDURE VISIT (OUTPATIENT)
Dept: PLASTIC SURGERY | Facility: CLINIC | Age: 69
End: 2024-10-07
Payer: MEDICARE

## 2024-10-07 VITALS
SYSTOLIC BLOOD PRESSURE: 128 MMHG | WEIGHT: 159.8 LBS | OXYGEN SATURATION: 96 % | BODY MASS INDEX: 29.4 KG/M2 | HEIGHT: 62 IN | TEMPERATURE: 97.8 F | HEART RATE: 108 BPM | DIASTOLIC BLOOD PRESSURE: 68 MMHG | RESPIRATION RATE: 12 BRPM

## 2024-10-07 VITALS
HEIGHT: 62 IN | DIASTOLIC BLOOD PRESSURE: 95 MMHG | SYSTOLIC BLOOD PRESSURE: 152 MMHG | WEIGHT: 159 LBS | BODY MASS INDEX: 29.26 KG/M2 | HEART RATE: 91 BPM | TEMPERATURE: 98.6 F

## 2024-10-07 DIAGNOSIS — J43.2 CENTRILOBULAR EMPHYSEMA (HCC): ICD-10-CM

## 2024-10-07 DIAGNOSIS — D23.39 DERMOID CYST OF FOREHEAD: Primary | ICD-10-CM

## 2024-10-07 DIAGNOSIS — F17.210 CIGARETTE NICOTINE DEPENDENCE WITHOUT COMPLICATION: ICD-10-CM

## 2024-10-07 DIAGNOSIS — R91.8 LUNG NODULES: Primary | ICD-10-CM

## 2024-10-07 PROCEDURE — 11442 EXC FACE-MM B9+MARG 1.1-2 CM: CPT | Performed by: STUDENT IN AN ORGANIZED HEALTH CARE EDUCATION/TRAINING PROGRAM

## 2024-10-07 PROCEDURE — 88305 TISSUE EXAM BY PATHOLOGIST: CPT | Performed by: PATHOLOGY

## 2024-10-07 PROCEDURE — 94010 BREATHING CAPACITY TEST: CPT | Performed by: INTERNAL MEDICINE

## 2024-10-07 PROCEDURE — 99215 OFFICE O/P EST HI 40 MIN: CPT | Performed by: STUDENT IN AN ORGANIZED HEALTH CARE EDUCATION/TRAINING PROGRAM

## 2024-10-07 PROCEDURE — 99214 OFFICE O/P EST MOD 30 MIN: CPT | Performed by: INTERNAL MEDICINE

## 2024-10-07 PROCEDURE — 13131 CMPLX RPR F/C/C/M/N/AX/G/H/F: CPT | Performed by: STUDENT IN AN ORGANIZED HEALTH CARE EDUCATION/TRAINING PROGRAM

## 2024-10-07 NOTE — PATIENT INSTRUCTIONS
Try to cut back cigarette smoking more.  If needed can use lozenges 2-3 on top of the patch      Do CT of chest in mid February 2025.      I will call you with results    Follow-up in 6 months or so

## 2024-10-07 NOTE — PROGRESS NOTES
PRS Note    Patient presents today for excision of central forehead cystic lesion.  Discussed procedure, risks and complications including infection, bleeding, scarring, need for further surgery if pathology reveals malignancy, recurrence of cyst. Patient acknowledged.    Pre-procedure dx: central forehead subcutaneous cystic lesion  Post-procedure dx: same  Procedure. Excision of central forehead subcutaneous cystic lesion with complex closure    Size of lesion: 1.5x0.5 cm  Total length of complex closure 1.6 cm    Written informed consent obtained.  The area was marked. 2 cc of 1% lidocaine with epi was used to locally infiltrate the area. After allowing for local anesthesia to take effect, the lesion was excised down to healthy subcutaneous tissue and sent for routine pathology. I then proceeded with complex closure to allow for tension free closure of the wound. The surrounding skin flaps were raised at least one width of the defect in all directions to allow for tension free closure of the wound. The dermis was reapproximated with 4-0 vicryl and skin with 5-0 interrupted nylon. Bacitracin was applied to the incision.    Patient did well without complications and was instructed to follow-up in 7 days for suture removal.  Post-procedure instructions given.  -Spent 45 minutes in consultation with patient. Greater than 50% of the total time was spent obtaining history, evaluation, performing exam, discussion of management options including post-operative care, answering patient's questions and concerns, chart reviewing, and documentation      Jamir Maria MD   [facility]   [address] [place] [address], [address]   [place] [address]   Office: [phone]

## 2024-10-07 NOTE — PROGRESS NOTES
Assessment & Plan        Problem List Items Addressed This Visit          Respiratory    Centrilobular emphysema (HCC)     Has very mild COPD.  Not have any shortness of breath.  Not needing any maintenance inhaler and has not needed to use her rescue albuterol inhaler.  Lung volumes by spirometry today were normal         Lung nodules - Primary     Body does have lung nodules.  CT of chest done August 13 this year showed multiple solid and groundglass nodules with no overall change but there is slight increase in size of a right lower lobe lung nodule which now measures 8 mm in size    I did order a CT scan of chest contrast to be done in February 2025 follow-up of his lung nodule if there is any further change         Relevant Orders    CT chest without contrast       Behavioral Health    Cigarette nicotine dependence without complication     She is still within 1 to 1.5 packs of sugars per day been smoking for 53 years.  I did encourage her to try to quit smoking at least to reduce her smoking below 1 pack of cigarettes per day         Relevant Orders    POCT spirometry         Cc: Doing well.  No shortness of breath    HPI    Maylin presents for follow-up of her mild COPD and also review results of last CT of chest that was done as follow-up scan  CT scan of chest without contrast done on August 13 of this year showed multiple solid and groundglass nodules with overall no change but there was some very mild increase in size of 8 mm solid lobulated  lower lobe lung nodule.    [place] and used to live in [place].  She has an over 2-hour drive to get here.  She lives in [facility]      Maylin still smoking about a pack of cigarettes per day and has been smoking for 55 years old.  She is using nicotine patch but not having much success with quitting smoking.  At 1 point in her life she was smoking 1 pack of cigarettes per day.  She does have some mild chronic  "hoarseness and has had this for years.  No change in her voice recently.    Last PFT done in February 2021 showed normal lung volumes with mild air trapping.  Her FEV1 was 1.84 L or 82% of predicted obstructive ratio of 78%.  Residual volume is mildly elevated 126% of predicted and total lung capacity was 102% of predicted and diffusion capacity measurement was mildly decreased at 73% predicted    Maylin is not having any shortness of breath.  She has occasional wheezing occasional cough.  She has not even been using albuterol inhaler does not feel like she needs 1.  Does not have any maintenance inhaler.    She does have history of very mild MARY diagnosed by home sleep study in June 2021 with overall AHI of 8.2..  She is not having excessive daytime somnolence she did not want to pursue CPAP therapy.  Her average oxygen level was 92% with robbin of 86%.  She only spent 1% of the time with O2 saturation below 89%.    She does have history of coronary artery disease and does follow with cardiologist at [facility].  She states he did retire and she will be seeing another cardiologist at that practice.  Not having any chest pain.    Past Medical History:   Diagnosis Date    Acid reflux     Anxiety     Arthritis     Cancer (HCC)     skin on chin    Cardiac disease     Chronic kidney disease     (R) kidney smaller than (L),  kidney stones    Chronic UTI (urinary tract infection)     Colitis     microscopic    COPD (chronic obstructive pulmonary disease) ([facility])     mild, recent dx    Depression     Disease of thyroid gland     \"pt reports nodules\"    Epidermal cyst of face     last assessed 10-    GERD (gastroesophageal reflux disease)     Heart attack (HCC) 09/20/2018    Kake (hard of hearing)     deaf (L) ear, decreased hearing (R) ear    Hyperlipemia     diet controlled    Hyperlipidemia     Hypertension     IBS (irritable bowel syndrome)     Malignant neoplasm of skin     Meniere disease     Migraine "     Myocardial infarction (HCC) 09/2018    Numbness and tingling of left side of face     Sleep apnea     Uterine leiomyoma        Past Surgical History:   Procedure Laterality Date    CARDIAC CATHETERIZATION      9/21/18 [facility] - severe CAD of the LAD and diagonal artery    COLONOSCOPY N/A 11/04/2016    Procedure: COLONOSCOPY;  Surgeon: Perry Miles MD;  Location: [facility] GI LAB;  Service:     ERCP      ESOPHAGOGASTRODUODENOSCOPY N/A 11/04/2016    Procedure: ESOPHAGOGASTRODUODENOSCOPY (EGD);  Surgeon: Perry Miles MD;  Location: [facility] GI LAB;  Service:     EYE SURGERY Left 03/01/2024    ID XCAPSL CTRC RMVL INSJ IO LENS PROSTH W/O ECP Left 03/07/2016    Procedure: EXTRACTION EXTRACAPSULAR CATARACT PHACO INTRAOCULAR LENS (IOL);  Surgeon: Huy Blancas MD;  Location: [facility] MAIN OR;  Service: Ophthalmology    ID XCAPSL CTRC RMVL INSJ IO LENS PROSTH W/O ECP Right 03/11/2024    Procedure: EXTRACTION EXTRACAPSULAR CATARACT PHACO INTRAOCULAR LENS (IOL);  Surgeon: Huy Blancas MD;  Location: [facility] MAIN OR;  Service: Ophthalmology    SKIN CANCER EXCISION      excision skin cancer on chin         Current Outpatient Medications:     aspirin 81 mg chewable tablet, Chew 81 mg daily, Disp: , Rfl:     labetalol (NORMODYNE) 100 mg tablet, Take 100 mg by mouth 2 (two) times a day, Disp: , Rfl:     Nexlizet 180-10 MG TABS, Take 1 tablet by mouth daily at bedtime, Disp: , Rfl:     rosuvastatin (CRESTOR) 5 mg tablet, Take 5 mg by mouth daily at bedtime, Disp: , Rfl:     ketorolac (ACULAR) 0.5 % ophthalmic solution, Administer 1 drop to the right eye 4 (four) times a day (Patient not taking: Reported on 4/10/2024), Disp: , Rfl:     LORazepam (ATIVAN) 0.5 mg tablet, Take 0.5 mg by mouth 2 (two) times a day (Patient not taking: Reported on 10/7/2024), Disp: , Rfl:     nicotine (NICODERM CQ) 21 mg/24 hr TD 24 hr patch, Place 1 patch on the skin daily (Patient not taking: Reported on 10/7/2024), Disp: 28 patch, Rfl: 0     ondansetron (Zofran ODT) 4 mg disintegrating tablet, Take 1 tablet (4 mg total) by mouth every 6 (six) hours as needed for nausea or vomiting (Patient not taking: Reported on 3/4/2024), Disp: 20 tablet, Rfl: 0    prednisoLONE acetate (PRED FORTE) 1 % ophthalmic suspension, Administer 1 drop to the right eye 4 (four) times a day (Patient not taking: Reported on 4/10/2024), Disp: , Rfl:     Allergies   Allergen Reactions    Allegra [Fexofenadine] Other (See Comments)     Per allergist pt states she was advised not to take any antihistamines in 2018    Bupropion Other (See Comments), Hives and Hypertension     Elevated  / 110    Erythromycin Hives and Itching    Nizatidine Other (See Comments)     Severe chest pain  Severe chest pain    Omeprazole Other (See Comments)     Had heart attack, advised by allergist not to take any pump inhibitors and no antihistamines    Other Reaction(s): Other (See Comments)      Had heart attack, advised by allergist not to take any pump inhibitors and no antihistamines    Had heart attack, advised by allergist not to take any pump inhibitors and no antihistamines      Other Reaction(s): Other (See Comments)  Had heart attack, advised by allergist not to take any pump inhibitors and no antihistamines    Penicillins Hives and Itching     Rash, hives    Tuna Oil [Fish Oil - Food Allergy] Anaphylaxis     Throat closes when eating tuna    Ciprofloxacin Hcl Hives    Fish-Derived Products - Food Allergy GI Intolerance     Tuna fish & all shell fish    Other Hives     allegic to all cillans and all mycins    Ampicillin     Benadryl [Diphenhydramine Hcl] Other (See Comments)     unknown    Ciprofloxacin      Severe hives, sob?    Clindamycin     Diphenhydramine      Facial swelling ?    Doxycycline     Lovastatin      Muscle aches  Muscle aches    Nickel Other (See Comments)     Throat swelling/itching    Pantoprazole Other (See Comments)    Ranitidine Other (See Comments)     "Shellfish-Derived Products - Food Allergy GI Intolerance     Pt unsure    Moxifloxacin Anxiety       Social History     Tobacco Use    Smoking status: Every Day     Current packs/day: 1.00     Average packs/day: 1 pack/day for 50.0 years (50.0 ttl pk-yrs)     Types: Cigarettes    Smokeless tobacco: Never    Tobacco comments:     52+ years per allscripts, still smoking every day, removes patch to smoke   Substance Use Topics    Alcohol use: No         Family History   Problem Relation Age of Onset    Heart disease Mother         cardiac disorder    Lung cancer Mother     COPD Mother     Brain cancer Father     Colonic polyp Sister     Thyroid cancer Sister     Skin cancer Brother     Skin cancer Brother     Diabetes Son     Neuropathy Son     Multiple sclerosis Son     No Known Problems Son     Colon cancer Maternal Grandmother     Breast cancer Cousin 20       Review of Systems   Constitutional:  Negative for chills, fever and unexpected weight change.   HENT:  Negative for congestion, rhinorrhea and sore throat.    Eyes:  Negative for discharge and redness.   Respiratory:          Not having any shortness of breath.  Does get occasional wheeze and cough   Cardiovascular:  Negative for chest pain, palpitations and leg swelling.   Gastrointestinal:  Negative for abdominal distention, abdominal pain and nausea.   Endocrine: Negative for polydipsia and polyphagia.   Genitourinary:  Negative for dysuria.   Musculoskeletal:  Negative for joint swelling and myalgias.   Skin:  Negative for rash.   Neurological:  Negative for light-headedness.   Psychiatric/Behavioral:  Negative for confusion.            Vitals:    10/07/24 1028   BP: 128/68   Pulse: (!) 108   Resp: 12   Temp: 97.8 °F (36.6 °C)   SpO2: 96%     Height: 5' 2\" (157.5 cm)  IBW (Ideal Body Weight): 50.1 kg  Body mass index is 29.23 kg/m².  Weight (last 2 days)       Date/Time Weight    10/07/24 1028 72.5 (159.8)                Physical Exam  Constitutional:      "  General: She is not in acute distress.     Appearance: She is well-developed.   HENT:      Head: Normocephalic.      Right Ear: External ear normal.      Left Ear: External ear normal.      Nose: Nose normal.      Mouth/Throat:      Mouth: Oropharynx is clear and moist. Mucous membranes are moist.      Pharynx: Oropharynx is clear. No oropharyngeal exudate.      Comments: Mild hoarseness of voice  Eyes:      Conjunctiva/sclera: Conjunctivae normal.      Pupils: Pupils are equal, round, and reactive to light.   Cardiovascular:      Rate and Rhythm: Normal rate and regular rhythm.      Heart sounds: Normal heart sounds.   Pulmonary:      Effort: Pulmonary effort is normal.      Comments: Faint expiratory wheeze right lower lobe otherwise clear.  No crackles wheezes or rhonchi  Abdominal:      General: There is no distension.      Palpations: Abdomen is soft.      Tenderness: There is no abdominal tenderness.   Musculoskeletal:      Cervical back: Neck supple.      Comments: No edema, cyanosis or clubbing   Lymphadenopathy:      Cervical: No cervical adenopathy.   Skin:     General: Skin is warm and dry.   Neurological:      General: No focal deficit present.      Mental Status: She is alert and oriented to person, place, and time.   Psychiatric:         Mood and Affect: Mood and affect and mood normal.         Behavior: Behavior normal.         Thought Content: Thought content normal.           Office Spirometry Results: done today    FVC - 2.33 L     81%  FEV1 - 1.74 L    80%  FEV1/FVC% - 75%    Normal lung volumes

## 2024-10-08 NOTE — ASSESSMENT & PLAN NOTE
She is still within 1 to 1.5 packs of sugars per day been smoking for 53 years.  I did encourage her to try to quit smoking at least to reduce her smoking below 1 pack of cigarettes per day

## 2024-10-08 NOTE — ASSESSMENT & PLAN NOTE
Has very mild COPD.  Not have any shortness of breath.  Not needing any maintenance inhaler and has not needed to use her rescue albuterol inhaler.  Lung volumes by spirometry today were normal

## 2024-10-08 NOTE — ASSESSMENT & PLAN NOTE
Body does have lung nodules.  CT of chest done August 13 this year showed multiple solid and groundglass nodules with no overall change but there is slight increase in size of a right lower lobe lung nodule which now measures 8 mm in size    I did order a CT scan of chest contrast to be done in February 2025 follow-up of his lung nodule if there is any further change

## 2024-10-09 ENCOUNTER — HOSPITAL ENCOUNTER (OUTPATIENT)
Dept: RADIOLOGY | Facility: HOSPITAL | Age: 69
Discharge: HOME | End: 2024-10-09
Attending: FAMILY MEDICINE

## 2024-10-14 ENCOUNTER — OFFICE VISIT (OUTPATIENT)
Dept: PLASTIC SURGERY | Facility: CLINIC | Age: 69
End: 2024-10-14

## 2024-10-14 DIAGNOSIS — D23.39 DERMOID CYST OF FOREHEAD: Primary | ICD-10-CM

## 2024-10-14 NOTE — PROGRESS NOTES
Patient seen today for a post op suture removal. She had a cystic lesion removed from her forehead on 10/7/24 by Dr. Maria. 5 sutures removed from her forehead. Incision is clean, dry and intact. Aquaphor applied to the area. Advised to apply aquaphor to the area. Pathology still pending. See photo in media. She will call the office if she needs a follow up appointment

## 2024-10-15 PROCEDURE — 88305 TISSUE EXAM BY PATHOLOGIST: CPT | Performed by: PATHOLOGY

## 2025-01-28 ENCOUNTER — HOSPITAL ENCOUNTER (OUTPATIENT)
Dept: CT IMAGING | Facility: HOSPITAL | Age: 70
Discharge: HOME | End: 2025-01-28
Attending: INTERNAL MEDICINE
Payer: MEDICARE

## 2025-01-28 DIAGNOSIS — R91.8 LUNG NODULES: ICD-10-CM

## 2025-01-28 PROCEDURE — 71250 CT THORAX DX C-: CPT

## 2025-02-04 ENCOUNTER — TELEPHONE (OUTPATIENT)
Age: 70
End: 2025-02-04

## 2025-02-14 DIAGNOSIS — R91.8 LUNG NODULES: ICD-10-CM

## 2025-02-14 DIAGNOSIS — J44.9 CHRONIC OBSTRUCTIVE PULMONARY DISEASE, UNSPECIFIED COPD TYPE (HCC): Primary | ICD-10-CM

## 2025-02-14 RX ORDER — ALBUTEROL SULFATE 90 UG/1
2 INHALANT RESPIRATORY (INHALATION) 4 TIMES DAILY PRN
Qty: 8.5 G | Refills: 3 | Status: SHIPPED | OUTPATIENT
Start: 2025-02-14

## 2025-02-14 NOTE — TELEPHONE ENCOUNTER
Pt requesting Dr. Andrews call her regarding her CT scan results / and when he wants to see her for a f/u appt

## 2025-02-14 NOTE — PROGRESS NOTES
I spoke to Maylin and reviewed results of CT scan of chest.  Shows some increase in size of right lower lobe lung nodule and also has another nodule which increased slightly in size.  Has some groundglass infiltrates.  She is still smoking half pack cigarettes per day.  I did recommend PET CT scan and she was agreeable to this.  Also she is getting some intermittent wheeze.  Not sure if this is related to gastric reflux she is getting around.  Does not have any inhaler so I ordered albuterol now she can use 2 puffs up to 4 times a day as needed    She still smoking a half a pack of cigarettes per day and I did speak to her about smoking sensation

## 2025-06-13 ENCOUNTER — TELEPHONE (OUTPATIENT)
Dept: PULMONOLOGY | Facility: MEDICAL CENTER | Age: 70
End: 2025-06-13

## 2025-06-13 NOTE — TELEPHONE ENCOUNTER
Spoke with patent attempted to schedule follow up, patient declined stating she will call office back to schedule.

## 2025-07-17 ENCOUNTER — HOSPITAL ENCOUNTER (OUTPATIENT)
Dept: RADIOLOGY | Facility: HOSPITAL | Age: 70
Discharge: HOME | End: 2025-07-17
Attending: FAMILY MEDICINE
Payer: MEDICARE

## 2025-07-17 VITALS — WEIGHT: 164 LBS | BODY MASS INDEX: 30.18 KG/M2 | HEIGHT: 62 IN

## 2025-07-17 DIAGNOSIS — Z12.31 ENCOUNTER FOR SCREENING MAMMOGRAM FOR BREAST CANCER: ICD-10-CM

## 2025-07-17 PROCEDURE — 77067 SCR MAMMO BI INCL CAD: CPT

## 2025-07-17 PROCEDURE — 77063 BREAST TOMOSYNTHESIS BI: CPT

## (undated) DEVICE — GLOVE SRG BIOGEL 7.5

## (undated) DEVICE — CENTURION VISION SYSTEM FMS

## (undated) DEVICE — THE MONARCH® "D" CARTRIDGE IS A SINGLE-USE POLYPROPYLENE CARTRIDGE FOR POSTERIOR CHAMBER IOL DELIVERY: Brand: MONARCH® III

## (undated) DEVICE — MICROSURGICAL INSTRUMENT IRR. CYSTITOME 25GA STRAIGHT-REVERSE CUTTING: Brand: ALCON

## (undated) DEVICE — ACTIVE FMS W/ INTREPID* ULTRA SLEEVES, 0.9MM 45° ABS* INTREPID* BALANCED TIP: Brand: ALCON

## (undated) DEVICE — INTREPID® TRANSFORMER IA HP: Brand: INTREPID®

## (undated) DEVICE — B-H IRRIGATING CAN 19GA FLAT ANGLED 8MM: Brand: OPHTHALMIC CANNULA

## (undated) DEVICE — AIR INJECT CANNULA 27GA: Brand: OPHTHALMIC CANNULA

## (undated) DEVICE — CLEARCUT® SLIT KNIFE INTREPID MICRO-COAXIAL SYSTEM 2.4 SB: Brand: CLEARCUT®; INTREPID

## (undated) DEVICE — EYE PACK CUSTOM -FINNEGAN